# Patient Record
Sex: FEMALE | Race: WHITE | NOT HISPANIC OR LATINO | Employment: FULL TIME | URBAN - METROPOLITAN AREA
[De-identification: names, ages, dates, MRNs, and addresses within clinical notes are randomized per-mention and may not be internally consistent; named-entity substitution may affect disease eponyms.]

---

## 2017-03-03 ENCOUNTER — APPOINTMENT (OUTPATIENT)
Dept: CT IMAGING | Facility: HOSPITAL | Age: 32
End: 2017-03-03

## 2017-03-03 ENCOUNTER — HOSPITAL ENCOUNTER (EMERGENCY)
Facility: HOSPITAL | Age: 32
Discharge: HOME | End: 2017-03-04
Attending: EMERGENCY MEDICINE | Admitting: EMERGENCY MEDICINE

## 2017-03-03 DIAGNOSIS — N93.9 ABNORMAL VAGINAL BLEEDING: ICD-10-CM

## 2017-03-03 DIAGNOSIS — J01.30 ACUTE NON-RECURRENT SPHENOIDAL SINUSITIS: Primary | ICD-10-CM

## 2017-03-03 LAB
ALBUMIN SERPL-MCNC: 4.1 G/DL (ref 3.2–4.8)
ALBUMIN/GLOB SERPL: 1.3 G/DL (ref 1.5–2.5)
ALP SERPL-CCNC: 100 U/L (ref 25–100)
ALT SERPL W P-5'-P-CCNC: 24 U/L (ref 7–40)
ANION GAP SERPL CALCULATED.3IONS-SCNC: 3 MMOL/L (ref 3–11)
AST SERPL-CCNC: 21 U/L (ref 0–33)
B-HCG UR QL: NEGATIVE
BACTERIA UR QL AUTO: ABNORMAL /HPF
BASOPHILS # BLD AUTO: 0.05 10*3/MM3 (ref 0–0.2)
BASOPHILS NFR BLD AUTO: 0.7 % (ref 0–1)
BILIRUB SERPL-MCNC: 0.4 MG/DL (ref 0.3–1.2)
BILIRUB UR QL STRIP: NEGATIVE
BUN BLD-MCNC: 6 MG/DL (ref 9–23)
BUN/CREAT SERPL: 10 (ref 7–25)
CALCIUM SPEC-SCNC: 9.4 MG/DL (ref 8.7–10.4)
CHLORIDE SERPL-SCNC: 103 MMOL/L (ref 99–109)
CLARITY UR: CLEAR
CO2 SERPL-SCNC: 33 MMOL/L (ref 20–31)
COLOR UR: YELLOW
CREAT BLD-MCNC: 0.6 MG/DL (ref 0.6–1.3)
D-LACTATE SERPL-SCNC: 0.8 MMOL/L (ref 0.5–2)
DEPRECATED RDW RBC AUTO: 43.8 FL (ref 37–54)
EOSINOPHIL # BLD AUTO: 0.28 10*3/MM3 (ref 0.1–0.3)
EOSINOPHIL NFR BLD AUTO: 3.9 % (ref 0–3)
ERYTHROCYTE [DISTWIDTH] IN BLOOD BY AUTOMATED COUNT: 12.6 % (ref 11.3–14.5)
FLUAV AG NPH QL: NEGATIVE
FLUBV AG NPH QL IA: NEGATIVE
GFR SERPL CREATININE-BSD FRML MDRD: 115 ML/MIN/1.73
GLOBULIN UR ELPH-MCNC: 3.2 GM/DL
GLUCOSE BLD-MCNC: 167 MG/DL (ref 70–100)
GLUCOSE UR STRIP-MCNC: ABNORMAL MG/DL
HCT VFR BLD AUTO: 44.5 % (ref 34.5–44)
HGB BLD-MCNC: 15.1 G/DL (ref 11.5–15.5)
HGB UR QL STRIP.AUTO: ABNORMAL
HYALINE CASTS UR QL AUTO: ABNORMAL /LPF
IMM GRANULOCYTES # BLD: 0.01 10*3/MM3 (ref 0–0.03)
IMM GRANULOCYTES NFR BLD: 0.1 % (ref 0–0.6)
KETONES UR QL STRIP: NEGATIVE
LEUKOCYTE ESTERASE UR QL STRIP.AUTO: NEGATIVE
LYMPHOCYTES # BLD AUTO: 2.46 10*3/MM3 (ref 0.6–4.8)
LYMPHOCYTES NFR BLD AUTO: 33.8 % (ref 24–44)
MCH RBC QN AUTO: 32.1 PG (ref 27–31)
MCHC RBC AUTO-ENTMCNC: 33.9 G/DL (ref 32–36)
MCV RBC AUTO: 94.5 FL (ref 80–99)
MONOCYTES # BLD AUTO: 0.36 10*3/MM3 (ref 0–1)
MONOCYTES NFR BLD AUTO: 5 % (ref 0–12)
NEUTROPHILS # BLD AUTO: 4.11 10*3/MM3 (ref 1.5–8.3)
NEUTROPHILS NFR BLD AUTO: 56.5 % (ref 41–71)
NITRITE UR QL STRIP: NEGATIVE
PH UR STRIP.AUTO: 7.5 [PH] (ref 5–8)
PLATELET # BLD AUTO: 386 10*3/MM3 (ref 150–450)
PMV BLD AUTO: 10 FL (ref 6–12)
POTASSIUM BLD-SCNC: 3.5 MMOL/L (ref 3.5–5.5)
PROT SERPL-MCNC: 7.3 G/DL (ref 5.7–8.2)
PROT UR QL STRIP: NEGATIVE
RBC # BLD AUTO: 4.71 10*6/MM3 (ref 3.89–5.14)
RBC # UR: ABNORMAL /HPF
REF LAB TEST METHOD: ABNORMAL
SODIUM BLD-SCNC: 139 MMOL/L (ref 132–146)
SP GR UR STRIP: 1.01 (ref 1–1.03)
SQUAMOUS #/AREA URNS HPF: ABNORMAL /HPF
UROBILINOGEN UR QL STRIP: ABNORMAL
WBC NRBC COR # BLD: 7.27 10*3/MM3 (ref 3.5–10.8)
WBC UR QL AUTO: ABNORMAL /HPF

## 2017-03-03 PROCEDURE — 81025 URINE PREGNANCY TEST: CPT | Performed by: EMERGENCY MEDICINE

## 2017-03-03 PROCEDURE — 25010000002 ONDANSETRON PER 1 MG: Performed by: EMERGENCY MEDICINE

## 2017-03-03 PROCEDURE — 87040 BLOOD CULTURE FOR BACTERIA: CPT | Performed by: EMERGENCY MEDICINE

## 2017-03-03 PROCEDURE — 99284 EMERGENCY DEPT VISIT MOD MDM: CPT

## 2017-03-03 PROCEDURE — 25010000002 KETOROLAC TROMETHAMINE PER 15 MG: Performed by: EMERGENCY MEDICINE

## 2017-03-03 PROCEDURE — 83605 ASSAY OF LACTIC ACID: CPT | Performed by: EMERGENCY MEDICINE

## 2017-03-03 PROCEDURE — 80053 COMPREHEN METABOLIC PANEL: CPT | Performed by: EMERGENCY MEDICINE

## 2017-03-03 PROCEDURE — 96361 HYDRATE IV INFUSION ADD-ON: CPT

## 2017-03-03 PROCEDURE — 96374 THER/PROPH/DIAG INJ IV PUSH: CPT

## 2017-03-03 PROCEDURE — 25010000002 HYDROMORPHONE PER 4 MG: Performed by: EMERGENCY MEDICINE

## 2017-03-03 PROCEDURE — 87086 URINE CULTURE/COLONY COUNT: CPT | Performed by: EMERGENCY MEDICINE

## 2017-03-03 PROCEDURE — 85025 COMPLETE CBC W/AUTO DIFF WBC: CPT | Performed by: EMERGENCY MEDICINE

## 2017-03-03 PROCEDURE — 70486 CT MAXILLOFACIAL W/O DYE: CPT

## 2017-03-03 PROCEDURE — 70450 CT HEAD/BRAIN W/O DYE: CPT

## 2017-03-03 PROCEDURE — 96376 TX/PRO/DX INJ SAME DRUG ADON: CPT

## 2017-03-03 PROCEDURE — 87804 INFLUENZA ASSAY W/OPTIC: CPT | Performed by: EMERGENCY MEDICINE

## 2017-03-03 PROCEDURE — 96375 TX/PRO/DX INJ NEW DRUG ADDON: CPT

## 2017-03-03 PROCEDURE — 81001 URINALYSIS AUTO W/SCOPE: CPT | Performed by: EMERGENCY MEDICINE

## 2017-03-03 RX ORDER — HYDROMORPHONE HYDROCHLORIDE 1 MG/ML
0.5 INJECTION, SOLUTION INTRAMUSCULAR; INTRAVENOUS; SUBCUTANEOUS ONCE
Status: COMPLETED | OUTPATIENT
Start: 2017-03-03 | End: 2017-03-03

## 2017-03-03 RX ORDER — ONDANSETRON 2 MG/ML
4 INJECTION INTRAMUSCULAR; INTRAVENOUS ONCE
Status: COMPLETED | OUTPATIENT
Start: 2017-03-03 | End: 2017-03-03

## 2017-03-03 RX ORDER — AMOXICILLIN AND CLAVULANATE POTASSIUM 875; 125 MG/1; MG/1
1 TABLET, FILM COATED ORAL EVERY 12 HOURS
Qty: 20 TABLET | Refills: 0 | Status: SHIPPED | OUTPATIENT
Start: 2017-03-03 | End: 2017-06-02

## 2017-03-03 RX ORDER — KETOROLAC TROMETHAMINE 30 MG/ML
30 INJECTION, SOLUTION INTRAMUSCULAR; INTRAVENOUS ONCE
Status: COMPLETED | OUTPATIENT
Start: 2017-03-03 | End: 2017-03-03

## 2017-03-03 RX ORDER — LAMOTRIGINE 100 MG/1
150 TABLET ORAL DAILY
COMMUNITY
End: 2017-06-02

## 2017-03-03 RX ORDER — SODIUM CHLORIDE 0.9 % (FLUSH) 0.9 %
10 SYRINGE (ML) INJECTION AS NEEDED
Status: DISCONTINUED | OUTPATIENT
Start: 2017-03-03 | End: 2017-03-04 | Stop reason: HOSPADM

## 2017-03-03 RX ADMIN — HYDROMORPHONE HYDROCHLORIDE 0.5 MG: 1 INJECTION, SOLUTION INTRAMUSCULAR; INTRAVENOUS; SUBCUTANEOUS at 21:01

## 2017-03-03 RX ADMIN — KETOROLAC TROMETHAMINE 30 MG: 30 INJECTION, SOLUTION INTRAMUSCULAR at 23:46

## 2017-03-03 RX ADMIN — SODIUM CHLORIDE 1000 ML: 9 INJECTION, SOLUTION INTRAVENOUS at 21:01

## 2017-03-03 RX ADMIN — HYDROMORPHONE HYDROCHLORIDE 0.5 MG: 1 INJECTION, SOLUTION INTRAMUSCULAR; INTRAVENOUS; SUBCUTANEOUS at 23:46

## 2017-03-03 RX ADMIN — ONDANSETRON 4 MG: 2 INJECTION INTRAMUSCULAR; INTRAVENOUS at 21:01

## 2017-03-04 VITALS
HEIGHT: 67 IN | TEMPERATURE: 98 F | DIASTOLIC BLOOD PRESSURE: 80 MMHG | OXYGEN SATURATION: 99 % | SYSTOLIC BLOOD PRESSURE: 118 MMHG | BODY MASS INDEX: 29.82 KG/M2 | RESPIRATION RATE: 18 BRPM | HEART RATE: 80 BPM | WEIGHT: 190 LBS

## 2017-03-04 NOTE — ED PROVIDER NOTES
Subjective   HPI Comments: Marisol Cuellar is a 31 y.o.female who presents to the ED with c/o vaginal bleeding onset today. The patient reports she began experiencing vaginal bleeding today. The patient reports she had an endometrial ablation performed ten years ago and has not had a period since. She presents to the ED for evaluation. Additionally, the patient c/o bilateral leg pain, finger pain, nausea and headache but denies chills, cough, congestion, vomiting or any other acute complaints at this time. The patient notes her headache is worsened with light, but the patient is able to move her head and neck around normally with no pain.  She tells me that she just feels achy all over.      Patient is a 31 y.o. female presenting with vaginal bleeding.   History provided by:  Patient  Vaginal Bleeding   Quality:  Dark red  Severity:  Moderate  Onset quality:  Sudden  Timing:  Constant  Progression:  Unchanged  Chronicity:  New  Menstrual history: pt had endometrial ablation ten years ago, has not had period since.  Possible pregnancy: no    Context: spontaneously    Relieved by:  None tried  Worsened by:  Nothing  Ineffective treatments:  None tried  Associated symptoms: nausea    Associated symptoms: no abdominal pain, no back pain, no dizziness and no fever        Review of Systems   Constitutional: Negative for chills, diaphoresis and fever.   HENT: Negative for congestion, rhinorrhea and sore throat.    Respiratory: Negative for cough and shortness of breath.    Cardiovascular: Negative for chest pain.   Gastrointestinal: Positive for nausea. Negative for abdominal pain, diarrhea and vomiting.   Genitourinary: Positive for vaginal bleeding.   Musculoskeletal: Negative for back pain and neck pain.        Bilateral leg pain and finger pain.   Neurological: Positive for headaches. Negative for dizziness and weakness.   All other systems reviewed and are negative.      Past Medical History   Diagnosis Date   •  Anxiety    • Depression    • Diabetes mellitus      TYPE 1   • Graves disease        Allergies   Allergen Reactions   • Sulfa Antibiotics Anaphylaxis       Past Surgical History   Procedure Laterality Date   •  section     • Foot surgery       METAL DINA PLACED IN FOOT   • Endometrial ablation         History reviewed. No pertinent family history.    Social History     Social History   • Marital status: Single     Spouse name: N/A   • Number of children: N/A   • Years of education: N/A     Social History Main Topics   • Smoking status: Former Smoker   • Smokeless tobacco: None      Comment: QUIT 12/15   • Alcohol use No   • Drug use: No   • Sexual activity: Defer     Other Topics Concern   • None     Social History Narrative   • None         Objective   Physical Exam   Constitutional: She is oriented to person, place, and time. She appears well-developed and well-nourished. No distress.   HENT:   Head: Normocephalic and atraumatic.   Cobblestoning in throat.    Eyes: Conjunctivae are normal. Pupils are equal, round, and reactive to light. No scleral icterus.   Neck: Normal range of motion. Neck supple.   Cardiovascular: Normal rate, regular rhythm and normal heart sounds.    Pulmonary/Chest: Effort normal and breath sounds normal. No respiratory distress.   Abdominal: Soft. Bowel sounds are normal. There is no tenderness.   Abdomen is non-tender.   Musculoskeletal: Normal range of motion. She exhibits no edema.   Neurological: She is alert and oriented to person, place, and time.   Skin: Skin is warm and dry.   Psychiatric: She has a normal mood and affect. Her behavior is normal.   Nursing note and vitals reviewed.      Procedures         ED Course  ED Course   Comment By Time   Perform pelvic exam.  Cervix appeared pink and healthy.  There was only a very small amount of dark brown matter in the posterior fornix.  Don't see active bleeding or lesions.  Bimanual exam did not reveal any tenderness or palpable  shania Mckeon MD 03/03 2131   I spoke with Mrs. Cuellar and her  about findings.  I think her symptoms are from a sphenoid sinusitis.  She tells me she cannot tolerate decongestants and does not want to have a prescription for narcotics.  I told her to take Mucinex and Afrin.  Will write a prescription for antibiotics.  Regarding her bleeding I have recommended she follow up with her gynecologist who is Dr. Rufus Mckeon MD 03/03 3542     Recent Results (from the past 24 hour(s))   Comprehensive Metabolic Panel    Collection Time: 03/03/17  8:54 PM   Result Value Ref Range    Glucose 167 (H) 70 - 100 mg/dL    BUN 6 (L) 9 - 23 mg/dL    Creatinine 0.60 0.60 - 1.30 mg/dL    Sodium 139 132 - 146 mmol/L    Potassium 3.5 3.5 - 5.5 mmol/L    Chloride 103 99 - 109 mmol/L    CO2 33.0 (H) 20.0 - 31.0 mmol/L    Calcium 9.4 8.7 - 10.4 mg/dL    Total Protein 7.3 5.7 - 8.2 g/dL    Albumin 4.10 3.20 - 4.80 g/dL    ALT (SGPT) 24 7 - 40 U/L    AST (SGOT) 21 0 - 33 U/L    Alkaline Phosphatase 100 25 - 100 U/L    Total Bilirubin 0.4 0.3 - 1.2 mg/dL    eGFR Non African Amer 115 >60 mL/min/1.73    Globulin 3.2 gm/dL    A/G Ratio 1.3 (L) 1.5 - 2.5 g/dL    BUN/Creatinine Ratio 10.0 7.0 - 25.0    Anion Gap 3.0 3.0 - 11.0 mmol/L   Urinalysis With / Culture If Indicated    Collection Time: 03/03/17  8:54 PM   Result Value Ref Range    Color, UA Yellow Yellow, Straw    Appearance, UA Clear Clear    pH, UA 7.5 5.0 - 8.0    Specific Gravity, UA 1.007 1.001 - 1.030    Glucose,  mg/dL (2+) (A) Negative    Ketones, UA Negative Negative    Bilirubin, UA Negative Negative    Blood, UA Small (1+) (A) Negative    Protein, UA Negative Negative    Leuk Esterase, UA Negative Negative    Nitrite, UA Negative Negative    Urobilinogen, UA 0.2 E.U./dL 0.2 - 1.0 E.U./dL   Pregnancy, Urine    Collection Time: 03/03/17  8:54 PM   Result Value Ref Range    HCG, Urine QL Negative Negative   Lactic Acid, Plasma    Collection  Time: 03/03/17  8:54 PM   Result Value Ref Range    Lactate 0.8 0.5 - 2.0 mmol/L   CBC Auto Differential    Collection Time: 03/03/17  8:54 PM   Result Value Ref Range    WBC 7.27 3.50 - 10.80 10*3/mm3    RBC 4.71 3.89 - 5.14 10*6/mm3    Hemoglobin 15.1 11.5 - 15.5 g/dL    Hematocrit 44.5 (H) 34.5 - 44.0 %    MCV 94.5 80.0 - 99.0 fL    MCH 32.1 (H) 27.0 - 31.0 pg    MCHC 33.9 32.0 - 36.0 g/dL    RDW 12.6 11.3 - 14.5 %    RDW-SD 43.8 37.0 - 54.0 fl    MPV 10.0 6.0 - 12.0 fL    Platelets 386 150 - 450 10*3/mm3    Neutrophil % 56.5 41.0 - 71.0 %    Lymphocyte % 33.8 24.0 - 44.0 %    Monocyte % 5.0 0.0 - 12.0 %    Eosinophil % 3.9 (H) 0.0 - 3.0 %    Basophil % 0.7 0.0 - 1.0 %    Immature Grans % 0.1 0.0 - 0.6 %    Neutrophils, Absolute 4.11 1.50 - 8.30 10*3/mm3    Lymphocytes, Absolute 2.46 0.60 - 4.80 10*3/mm3    Monocytes, Absolute 0.36 0.00 - 1.00 10*3/mm3    Eosinophils, Absolute 0.28 0.10 - 0.30 10*3/mm3    Basophils, Absolute 0.05 0.00 - 0.20 10*3/mm3    Immature Grans, Absolute 0.01 0.00 - 0.03 10*3/mm3   Urinalysis, Microscopic Only    Collection Time: 03/03/17  8:54 PM   Result Value Ref Range    RBC, UA 0-2 None Seen, 0-2 /HPF    WBC, UA 0-2 (A) None Seen /HPF    Bacteria, UA None Seen None Seen, Trace /HPF    Squamous Epithelial Cells, UA 0-2 None Seen, 0-2 /HPF    Hyaline Casts, UA 0-6 0 - 6 /LPF    Methodology Automated Microscopy    Influenza Antigen    Collection Time: 03/03/17  9:01 PM   Result Value Ref Range    Influenza A Ag, EIA Negative Negative    Influenza B Ag, EIA Negative Negative     Note: In addition to lab results from this visit, the labs listed above may include labs taken at another facility or during a different encounter within the last 24 hours. Please correlate lab times with ED admission and discharge times for further clarification of the services performed during this visit.    CT Sinus Without Contrast   Final Result   Abnormal      1.  Mild ethmoid and bilateral maxillary sinus  "disease.        2.  Incidental/non-acute findings are described above.         THIS DOCUMENT HAS BEEN ELECTRONICALLY SIGNED BY VERONICA MARTINES MD      CT Head Without Contrast   Final Result   Abnormal      1. No acute findings.      2. Non-acute findings are described above.         THIS DOCUMENT HAS BEEN ELECTRONICALLY SIGNED BY VERONICA MARTINES MD        Vitals:    03/03/17 1843 03/04/17 0014   BP: 123/81 118/80   BP Location: Left arm    Patient Position: Sitting Sitting   Pulse: 82 80   Resp: 16 18   Temp: 98 °F (36.7 °C)    TempSrc: Oral    SpO2: 100% 99%   Weight: 190 lb (86.2 kg)    Height: 67\" (170.2 cm)      Medications   sodium chloride 0.9 % flush 10 mL (not administered)   sodium chloride 0.9 % bolus 1,000 mL (0 mL Intravenous Stopped 3/3/17 2215)   HYDROmorphone (DILAUDID) injection 0.5 mg (0.5 mg Intravenous Given 3/3/17 2101)   ondansetron (ZOFRAN) injection 4 mg (4 mg Intravenous Given 3/3/17 2101)   ketorolac (TORADOL) injection 30 mg (30 mg Intravenous Given 3/3/17 2346)   HYDROmorphone (DILAUDID) injection 0.5 mg (0.5 mg Intravenous Given 3/3/17 2346)     ECG/EMG Results (last 24 hours)     ** No results found for the last 24 hours. **                  MDM  Number of Diagnoses or Management Options  Abnormal vaginal bleeding: new and requires workup  Acute non-recurrent sphenoidal sinusitis: new and requires workup     Amount and/or Complexity of Data Reviewed  Clinical lab tests: ordered and reviewed  Tests in the radiology section of CPT®: ordered and reviewed  Independent visualization of images, tracings, or specimens: yes    Patient Progress  Patient progress: stable      Final diagnoses:   Acute non-recurrent sphenoidal sinusitis   Abnormal vaginal bleeding       Documentation assistance provided by edwin Lynch.  Information recorded by the edwin was done at my direction and has been verified and validated by me.     Jenny Lynch  03/03/17 2007       Jenny Dillon " [facility]  03/03/17 2132       [facility]  03/03/17 2144       Ludin Mckeon MD  03/04/17 0133

## 2017-03-05 LAB — BACTERIA SPEC AEROBE CULT: NORMAL

## 2017-03-08 LAB
BACTERIA SPEC AEROBE CULT: NORMAL
BACTERIA SPEC AEROBE CULT: NORMAL

## 2017-04-07 PROCEDURE — 99284 EMERGENCY DEPT VISIT MOD MDM: CPT

## 2017-04-08 ENCOUNTER — HOSPITAL ENCOUNTER (EMERGENCY)
Facility: HOSPITAL | Age: 32
Discharge: HOME | End: 2017-04-08
Attending: EMERGENCY MEDICINE | Admitting: EMERGENCY MEDICINE

## 2017-04-08 ENCOUNTER — APPOINTMENT (OUTPATIENT)
Dept: GENERAL RADIOLOGY | Facility: HOSPITAL | Age: 32
End: 2017-04-08

## 2017-04-08 VITALS
HEIGHT: 68 IN | SYSTOLIC BLOOD PRESSURE: 104 MMHG | DIASTOLIC BLOOD PRESSURE: 53 MMHG | TEMPERATURE: 98.1 F | OXYGEN SATURATION: 97 % | HEART RATE: 78 BPM | RESPIRATION RATE: 16 BRPM | WEIGHT: 185 LBS | BODY MASS INDEX: 28.04 KG/M2

## 2017-04-08 DIAGNOSIS — R73.9 HYPERGLYCEMIA: ICD-10-CM

## 2017-04-08 DIAGNOSIS — IMO0001 INSULIN DEPENDENT DIABETES MELLITUS: Primary | ICD-10-CM

## 2017-04-08 DIAGNOSIS — E11.65 POORLY CONTROLLED DIABETES MELLITUS (HCC): ICD-10-CM

## 2017-04-08 LAB
ALBUMIN SERPL-MCNC: 4.1 G/DL (ref 3.2–4.8)
ALBUMIN/GLOB SERPL: 1.7 G/DL (ref 1.5–2.5)
ALP SERPL-CCNC: 103 U/L (ref 25–100)
ALT SERPL W P-5'-P-CCNC: 18 U/L (ref 7–40)
ANION GAP SERPL CALCULATED.3IONS-SCNC: 5 MMOL/L (ref 3–11)
AST SERPL-CCNC: 23 U/L (ref 0–33)
B-OH-BUTYR SERPL-SCNC: 0.66 MMOL/L
BACTERIA UR QL AUTO: ABNORMAL /HPF
BASOPHILS # BLD AUTO: 0.04 10*3/MM3 (ref 0–0.2)
BASOPHILS NFR BLD AUTO: 0.5 % (ref 0–1)
BILIRUB SERPL-MCNC: 0.5 MG/DL (ref 0.3–1.2)
BILIRUB UR QL STRIP: NEGATIVE
BUN BLD-MCNC: 7 MG/DL (ref 9–23)
BUN BLDA-MCNC: 3 MG/DL (ref 8–26)
BUN/CREAT SERPL: 8.8 (ref 7–25)
CA-I BLDA-SCNC: 1.2 MMOL/L (ref 1.2–1.32)
CALCIUM SPEC-SCNC: 9.8 MG/DL (ref 8.7–10.4)
CHLORIDE BLDA-SCNC: 96 MMOL/L (ref 98–109)
CHLORIDE SERPL-SCNC: 95 MMOL/L (ref 99–109)
CLARITY UR: CLEAR
CO2 BLDA-SCNC: 25 MMOL/L (ref 24–29)
CO2 SERPL-SCNC: 30 MMOL/L (ref 20–31)
COLOR UR: YELLOW
CREAT BLD-MCNC: 0.8 MG/DL (ref 0.6–1.3)
CREAT BLDA-MCNC: 0.4 MG/DL (ref 0.6–1.3)
DEPRECATED RDW RBC AUTO: 45.8 FL (ref 37–54)
EOSINOPHIL # BLD AUTO: 0.19 10*3/MM3 (ref 0.1–0.3)
EOSINOPHIL NFR BLD AUTO: 2.5 % (ref 0–3)
ERYTHROCYTE [DISTWIDTH] IN BLOOD BY AUTOMATED COUNT: 13 % (ref 11.3–14.5)
GFR SERPL CREATININE-BSD FRML MDRD: 83 ML/MIN/1.73
GLOBULIN UR ELPH-MCNC: 2.4 GM/DL
GLUCOSE BLD-MCNC: 542 MG/DL (ref 70–100)
GLUCOSE BLDC GLUCOMTR-MCNC: 307 MG/DL (ref 70–130)
GLUCOSE BLDC GLUCOMTR-MCNC: 317 MG/DL (ref 70–130)
GLUCOSE BLDC GLUCOMTR-MCNC: 322 MG/DL (ref 70–130)
GLUCOSE BLDC GLUCOMTR-MCNC: 507 MG/DL (ref 70–130)
GLUCOSE UR STRIP-MCNC: ABNORMAL MG/DL
HCT VFR BLD AUTO: 41.7 % (ref 34.5–44)
HCT VFR BLDA CALC: 39 % (ref 38–51)
HGB BLD-MCNC: 13.6 G/DL (ref 11.5–15.5)
HGB BLDA-MCNC: 13.3 G/DL (ref 12–17)
HGB UR QL STRIP.AUTO: ABNORMAL
HYALINE CASTS UR QL AUTO: ABNORMAL /LPF
IMM GRANULOCYTES # BLD: 0.02 10*3/MM3 (ref 0–0.03)
IMM GRANULOCYTES NFR BLD: 0.3 % (ref 0–0.6)
KETONES UR QL STRIP: ABNORMAL
LEUKOCYTE ESTERASE UR QL STRIP.AUTO: NEGATIVE
LYMPHOCYTES # BLD AUTO: 2.55 10*3/MM3 (ref 0.6–4.8)
LYMPHOCYTES NFR BLD AUTO: 33.1 % (ref 24–44)
MCH RBC QN AUTO: 31.6 PG (ref 27–31)
MCHC RBC AUTO-ENTMCNC: 32.6 G/DL (ref 32–36)
MCV RBC AUTO: 96.8 FL (ref 80–99)
MONOCYTES # BLD AUTO: 0.5 10*3/MM3 (ref 0–1)
MONOCYTES NFR BLD AUTO: 6.5 % (ref 0–12)
NEUTROPHILS # BLD AUTO: 4.4 10*3/MM3 (ref 1.5–8.3)
NEUTROPHILS NFR BLD AUTO: 57.1 % (ref 41–71)
NITRITE UR QL STRIP: NEGATIVE
PH UR STRIP.AUTO: 7 [PH] (ref 5–8)
PLATELET # BLD AUTO: 329 10*3/MM3 (ref 150–450)
PMV BLD AUTO: 10.6 FL (ref 6–12)
POTASSIUM BLD-SCNC: 4.5 MMOL/L (ref 3.5–5.5)
POTASSIUM BLDA-SCNC: 3.6 MMOL/L (ref 3.5–4.9)
PROT SERPL-MCNC: 6.5 G/DL (ref 5.7–8.2)
PROT UR QL STRIP: NEGATIVE
RBC # BLD AUTO: 4.31 10*6/MM3 (ref 3.89–5.14)
RBC # UR: ABNORMAL /HPF
REF LAB TEST METHOD: ABNORMAL
SODIUM BLD-SCNC: 130 MMOL/L (ref 132–146)
SODIUM BLDA-SCNC: 137 MMOL/L (ref 138–146)
SP GR UR STRIP: 1.03 (ref 1–1.03)
SQUAMOUS #/AREA URNS HPF: ABNORMAL /HPF
UROBILINOGEN UR QL STRIP: ABNORMAL
WBC NRBC COR # BLD: 7.7 10*3/MM3 (ref 3.5–10.8)
WBC UR QL AUTO: ABNORMAL /HPF

## 2017-04-08 PROCEDURE — 25010000002 ONDANSETRON PER 1 MG: Performed by: PHYSICIAN ASSISTANT

## 2017-04-08 PROCEDURE — 82010 KETONE BODYS QUAN: CPT | Performed by: PHYSICIAN ASSISTANT

## 2017-04-08 PROCEDURE — 85014 HEMATOCRIT: CPT

## 2017-04-08 PROCEDURE — 96361 HYDRATE IV INFUSION ADD-ON: CPT

## 2017-04-08 PROCEDURE — 96375 TX/PRO/DX INJ NEW DRUG ADDON: CPT

## 2017-04-08 PROCEDURE — 82962 GLUCOSE BLOOD TEST: CPT

## 2017-04-08 PROCEDURE — 71020 HC CHEST PA AND LATERAL: CPT

## 2017-04-08 PROCEDURE — 63710000001 INSULIN REGULAR HUMAN PER 5 UNITS: Performed by: PHYSICIAN ASSISTANT

## 2017-04-08 PROCEDURE — 25010000002 KETOROLAC TROMETHAMINE PER 15 MG: Performed by: PHYSICIAN ASSISTANT

## 2017-04-08 PROCEDURE — 80053 COMPREHEN METABOLIC PANEL: CPT | Performed by: PHYSICIAN ASSISTANT

## 2017-04-08 PROCEDURE — 80047 BASIC METABLC PNL IONIZED CA: CPT

## 2017-04-08 PROCEDURE — 96374 THER/PROPH/DIAG INJ IV PUSH: CPT

## 2017-04-08 PROCEDURE — 85025 COMPLETE CBC W/AUTO DIFF WBC: CPT | Performed by: PHYSICIAN ASSISTANT

## 2017-04-08 PROCEDURE — 81001 URINALYSIS AUTO W/SCOPE: CPT | Performed by: PHYSICIAN ASSISTANT

## 2017-04-08 RX ORDER — ONDANSETRON 2 MG/ML
4 INJECTION INTRAMUSCULAR; INTRAVENOUS ONCE
Status: COMPLETED | OUTPATIENT
Start: 2017-04-08 | End: 2017-04-08

## 2017-04-08 RX ORDER — KETOROLAC TROMETHAMINE 15 MG/ML
15 INJECTION, SOLUTION INTRAMUSCULAR; INTRAVENOUS ONCE
Status: COMPLETED | OUTPATIENT
Start: 2017-04-08 | End: 2017-04-08

## 2017-04-08 RX ORDER — SODIUM CHLORIDE 0.9 % (FLUSH) 0.9 %
10 SYRINGE (ML) INJECTION AS NEEDED
Status: DISCONTINUED | OUTPATIENT
Start: 2017-04-08 | End: 2017-04-08 | Stop reason: HOSPADM

## 2017-04-08 RX ADMIN — ONDANSETRON 4 MG: 2 INJECTION INTRAMUSCULAR; INTRAVENOUS at 03:14

## 2017-04-08 RX ADMIN — INSULIN HUMAN 10 UNITS: 100 INJECTION, SOLUTION PARENTERAL at 02:45

## 2017-04-08 RX ADMIN — SODIUM CHLORIDE 1000 ML: 9 INJECTION, SOLUTION INTRAVENOUS at 02:19

## 2017-04-08 RX ADMIN — SODIUM CHLORIDE 1000 ML: 9 INJECTION, SOLUTION INTRAVENOUS at 03:30

## 2017-04-08 RX ADMIN — KETOROLAC TROMETHAMINE 15 MG: 15 INJECTION, SOLUTION INTRAMUSCULAR; INTRAVENOUS at 03:30

## 2017-04-08 NOTE — ED PROVIDER NOTES
Subjective   Patient is a 31 y.o. female presenting with hyperglycemia.   Hyperglycemia   Blood sugar level PTA:  600  Onset quality:  Gradual  Duration:  1 day  Timing:  Constant  Progression:  Partially resolved  Chronicity:  Recurrent  Diabetes status:  Controlled with insulin  Context: noncompliance    Context: not change in medication and not recent illness    Relieved by:  Nothing  Ineffective treatments:  None tried  Associated symptoms: dehydration, dizziness, fatigue, malaise and nausea    Associated symptoms: no abdominal pain, no altered mental status, no chest pain, no confusion, no diaphoresis, no dysuria and no vomiting      31-year-old female presents with a 24-hour history of worsening hyperglycemia increased thirst polyuria dehydration weakness fatigue malaise and muscle aches.  She has a history of noncompliance, average hemoglobin A1c is 10+, she is followed by [facility] department of endocrinology.  She is not prone to ketosis.  She denies vision changes or photophobia no stiff neck no chest pain or abdominal pain.She believes she forgot to take her insulin this morning.    Review of Systems   Constitutional: Positive for fatigue. Negative for diaphoresis.   Cardiovascular: Negative for chest pain.   Gastrointestinal: Positive for nausea. Negative for abdominal pain and vomiting.   Genitourinary: Negative for dysuria.   Neurological: Positive for dizziness.   Psychiatric/Behavioral: Negative for confusion.   All other systems reviewed and are negative.      Past Medical History:   Diagnosis Date   • Anxiety    • Depression    • Diabetes mellitus     TYPE 1   • Graves disease        Allergies   Allergen Reactions   • Sulfa Antibiotics Anaphylaxis       Past Surgical History:   Procedure Laterality Date   •  SECTION     • ENDOMETRIAL ABLATION     • FOOT SURGERY      METAL DINA PLACED IN FOOT       History reviewed. No pertinent family history.    Social History     Social  History   • Marital status: Single     Spouse name: N/A   • Number of children: N/A   • Years of education: N/A     Social History Main Topics   • Smoking status: Former Smoker   • Smokeless tobacco: None      Comment: QUIT 12/15   • Alcohol use No   • Drug use: No   • Sexual activity: Defer     Other Topics Concern   • None     Social History Narrative           Objective   Physical Exam   Constitutional: She is oriented to person, place, and time. She appears well-developed and well-nourished. No distress.   Awake alert oriented not toxic mucous membranes are dry Airways patent   HENT:   Head: Normocephalic and atraumatic.   Right Ear: External ear normal.   Left Ear: External ear normal.   Nose: Nose normal.   Mouth/Throat: Oropharynx is clear and moist. No oropharyngeal exudate.   Eyes: Conjunctivae and EOM are normal. Pupils are equal, round, and reactive to light. Right eye exhibits no discharge. Left eye exhibits no discharge. No scleral icterus.   Neck: Normal range of motion. Neck supple. No JVD present. No tracheal deviation present. No thyromegaly present.   Cardiovascular: Normal rate, regular rhythm, normal heart sounds and intact distal pulses.  Exam reveals no gallop and no friction rub.    No murmur heard.  Pulmonary/Chest: Effort normal and breath sounds normal. No stridor. No respiratory distress. She has no wheezes. She has no rales. She exhibits no tenderness.   Abdominal: Soft. Bowel sounds are normal. She exhibits no distension. There is no tenderness. There is no rebound and no guarding.   Musculoskeletal: Normal range of motion. She exhibits no edema, tenderness or deformity.   Neurological: She is alert and oriented to person, place, and time. No cranial nerve deficit. She exhibits normal muscle tone. Coordination normal.   Skin: Skin is warm and dry. No rash noted. She is not diaphoretic. No erythema. No pallor.   Psychiatric: She has a normal mood and affect. Her behavior is normal.  Judgment and thought content normal.   Nursing note and vitals reviewed.      Procedures        Recent Results (from the past 24 hour(s))   POC Glucose Fingerstick    Collection Time: 04/08/17  2:01 AM   Result Value Ref Range    Glucose 507 (C) 70 - 130 mg/dL   CBC Auto Differential    Collection Time: 04/08/17  2:16 AM   Result Value Ref Range    WBC 7.70 3.50 - 10.80 10*3/mm3    RBC 4.31 3.89 - 5.14 10*6/mm3    Hemoglobin 13.6 11.5 - 15.5 g/dL    Hematocrit 41.7 34.5 - 44.0 %    MCV 96.8 80.0 - 99.0 fL    MCH 31.6 (H) 27.0 - 31.0 pg    MCHC 32.6 32.0 - 36.0 g/dL    RDW 13.0 11.3 - 14.5 %    RDW-SD 45.8 37.0 - 54.0 fl    MPV 10.6 6.0 - 12.0 fL    Platelets 329 150 - 450 10*3/mm3    Neutrophil % 57.1 41.0 - 71.0 %    Lymphocyte % 33.1 24.0 - 44.0 %    Monocyte % 6.5 0.0 - 12.0 %    Eosinophil % 2.5 0.0 - 3.0 %    Basophil % 0.5 0.0 - 1.0 %    Immature Grans % 0.3 0.0 - 0.6 %    Neutrophils, Absolute 4.40 1.50 - 8.30 10*3/mm3    Lymphocytes, Absolute 2.55 0.60 - 4.80 10*3/mm3    Monocytes, Absolute 0.50 0.00 - 1.00 10*3/mm3    Eosinophils, Absolute 0.19 0.10 - 0.30 10*3/mm3    Basophils, Absolute 0.04 0.00 - 0.20 10*3/mm3    Immature Grans, Absolute 0.02 0.00 - 0.03 10*3/mm3   Comprehensive Metabolic Panel    Collection Time: 04/08/17  2:16 AM   Result Value Ref Range    Glucose 542 (C) 70 - 100 mg/dL    BUN 7 (L) 9 - 23 mg/dL    Creatinine 0.80 0.60 - 1.30 mg/dL    Sodium 130 (L) 132 - 146 mmol/L    Potassium 4.5 3.5 - 5.5 mmol/L    Chloride 95 (L) 99 - 109 mmol/L    CO2 30.0 20.0 - 31.0 mmol/L    Calcium 9.8 8.7 - 10.4 mg/dL    Total Protein 6.5 5.7 - 8.2 g/dL    Albumin 4.10 3.20 - 4.80 g/dL    ALT (SGPT) 18 7 - 40 U/L    AST (SGOT) 23 0 - 33 U/L    Alkaline Phosphatase 103 (H) 25 - 100 U/L    Total Bilirubin 0.5 0.3 - 1.2 mg/dL    eGFR Non African Amer 83 >60 mL/min/1.73    Globulin 2.4 gm/dL    A/G Ratio 1.7 1.5 - 2.5 g/dL    BUN/Creatinine Ratio 8.8 7.0 - 25.0    Anion Gap 5.0 3.0 - 11.0 mmol/L   Beta  Hydroxybutyrate Quantitative    Collection Time: 04/08/17  2:16 AM   Result Value Ref Range    Beta-Hydroxybutyrate Quant 0.660 (H) <=0.500 mmol/L   Urinalysis With / Culture If Indicated    Collection Time: 04/08/17  2:42 AM   Result Value Ref Range    Color, UA Yellow Yellow, Straw    Appearance, UA Clear Clear    pH, UA 7.0 5.0 - 8.0    Specific Gravity, UA 1.029 1.001 - 1.030    Glucose, UA >=1000 mg/dL (3+) (A) Negative    Ketones, UA 15 mg/dL (1+) (A) Negative    Bilirubin, UA Negative Negative    Blood, UA Large (3+) (A) Negative    Protein, UA Negative Negative    Leuk Esterase, UA Negative Negative    Nitrite, UA Negative Negative    Urobilinogen, UA 0.2 E.U./dL 0.2 - 1.0 E.U./dL   Urinalysis, Microscopic Only    Collection Time: 04/08/17  2:42 AM   Result Value Ref Range    RBC, UA 31-50 (A) None Seen, 0-2 /HPF    WBC, UA 0-2 (A) None Seen /HPF    Bacteria, UA None Seen None Seen, Trace /HPF    Squamous Epithelial Cells, UA None Seen None Seen, 0-2 /HPF    Hyaline Casts, UA None Seen 0 - 6 /LPF    Methodology Automated Microscopy    POC Glucose Fingerstick    Collection Time: 04/08/17  4:25 AM   Result Value Ref Range    Glucose 322 (H) 70 - 130 mg/dL     Note: In addition to lab results from this visit, the labs listed above may include labs taken at another facility or during a different encounter within the last 24 hours. Please correlate lab times with ED admission and discharge times for further clarification of the services performed during this visit.    XR Chest PA & Lateral    (Results Pending)     Vitals:    04/08/17 0217 04/08/17 0315 04/08/17 0415 04/08/17 0416   BP: 126/79 119/78 112/78    BP Location:       Patient Position:       Pulse:       Resp:       Temp:       TempSrc:       SpO2:  97%  95%   Weight:       Height:         Medications   sodium chloride 0.9 % flush 10 mL (not administered)   insulin regular (humuLIN R,novoLIN R) injection 10 Units (10 Units Subcutaneous Given 4/8/17  0245)   sodium chloride 0.9 % bolus 1,000 mL (1,000 mL Intravenous New Bag 4/8/17 0219)   ondansetron (ZOFRAN) injection 4 mg (4 mg Intravenous Given 4/8/17 0314)   sodium chloride 0.9 % bolus 1,000 mL (1,000 mL Intravenous New Bag 4/8/17 0330)   ketorolac (TORADOL) injection 15 mg (15 mg Intravenous Given 4/8/17 0330)     ECG/EMG Results (last 24 hours)     ** No results found for the last 24 hours. **            ED Course  ED Course   Value Comment By Time   Beta-Hydroxybutyrate Quant: (!) 0.660 (Reviewed) Nicolas Pitt PA-C 04/08 0322    Recheck as 0 425, by mouth C glucose 322, beta hydroxybutyrate 0.66, ketonuria 1+, specific gravity 1.029, anion gap is 5.    She  is not ketotic, is not acidotic, is not significantly dehydrated, we will discharge to home with instructions to closely monitor glucose levels and maintain adequate dietary intake.  She was encouraged to follow up with her endocrinologist to discuss regular sliding scale insulin in addition to her mixed insulin dosages.patient and her mother voice understanding and are agreeable with plan. Nicolas Pitt PA-C 04/08 0511                  [facility]    Final diagnoses:   Insulin dependent diabetes mellitus   Hyperglycemia   Poorly controlled diabetes mellitus            Nicolas Pitt PA-C  04/08/17 0513

## 2017-04-08 NOTE — DISCHARGE INSTRUCTIONS
Increase her fluid intake and maintain adequate dietary intake.  Monitor your blood glucose levels closely.  Contact your endocrinologist as soon as possible for recheck and discuss adding regular sliding scale insulin to your mix insulin dosage.  Return to emergency department if any change or worsening.

## 2017-06-02 ENCOUNTER — HOSPITAL ENCOUNTER (OUTPATIENT)
Facility: HOSPITAL | Age: 32
Setting detail: OBSERVATION
Discharge: HOME | End: 2017-06-03
Attending: EMERGENCY MEDICINE | Admitting: INTERNAL MEDICINE

## 2017-06-02 ENCOUNTER — APPOINTMENT (OUTPATIENT)
Dept: GENERAL RADIOLOGY | Facility: HOSPITAL | Age: 32
End: 2017-06-02

## 2017-06-02 DIAGNOSIS — R07.9 CHEST PAIN, UNSPECIFIED TYPE: Primary | ICD-10-CM

## 2017-06-02 DIAGNOSIS — E10.8 TYPE 1 DIABETES MELLITUS WITH COMPLICATION (HCC): ICD-10-CM

## 2017-06-02 DIAGNOSIS — R73.9 HYPERGLYCEMIA: ICD-10-CM

## 2017-06-02 PROBLEM — R52 GENERALIZED PAIN: Status: ACTIVE | Noted: 2017-06-02

## 2017-06-02 PROBLEM — E11.9 DIABETES MELLITUS: Status: ACTIVE | Noted: 2017-06-02

## 2017-06-02 PROBLEM — R11.2 NAUSEA AND VOMITING DUE TO HYPERGLYCEMIA: Status: ACTIVE | Noted: 2017-06-02

## 2017-06-02 PROBLEM — E87.1 HYPONATREMIA: Status: ACTIVE | Noted: 2017-06-02

## 2017-06-02 LAB
ALBUMIN SERPL-MCNC: 4.8 G/DL (ref 3.2–4.8)
ALBUMIN/GLOB SERPL: 1.5 G/DL (ref 1.5–2.5)
ALP SERPL-CCNC: 128 U/L (ref 25–100)
ALT SERPL W P-5'-P-CCNC: 18 U/L (ref 7–40)
ANION GAP SERPL CALCULATED.3IONS-SCNC: 14 MMOL/L (ref 3–11)
AST SERPL-CCNC: 24 U/L (ref 0–33)
ATMOSPHERIC PRESS: 741 MMHG
B-HCG UR QL: NEGATIVE
B-OH-BUTYR SERPL-SCNC: 4.61 MMOL/L
BASE EXCESS BLDV CALC-SCNC: -5.4 MMOL/L (ref -2–2)
BASOPHILS # BLD AUTO: 0.08 10*3/MM3 (ref 0–0.2)
BASOPHILS NFR BLD AUTO: 0.5 % (ref 0–1)
BDY SITE: ABNORMAL
BILIRUB SERPL-MCNC: 0.6 MG/DL (ref 0.3–1.2)
BILIRUB UR QL STRIP: NEGATIVE
BUN BLD-MCNC: 13 MG/DL (ref 9–23)
BUN/CREAT SERPL: 14.4 (ref 7–25)
CALCIUM SPEC-SCNC: 9.8 MG/DL (ref 8.7–10.4)
CHLORIDE SERPL-SCNC: 92 MMOL/L (ref 99–109)
CLARITY UR: CLEAR
CO2 SERPL-SCNC: 24 MMOL/L (ref 20–31)
COHGB MFR BLD: 1.3 % (ref 0–2)
COLOR UR: YELLOW
CREAT BLD-MCNC: 0.9 MG/DL (ref 0.6–1.3)
D DIMER PPP FEU-MCNC: 0.39 MG/L (FEU) (ref 0–0.5)
DEPRECATED RDW RBC AUTO: 46 FL (ref 37–54)
EOSINOPHIL # BLD AUTO: 0.34 10*3/MM3 (ref 0.1–0.3)
EOSINOPHIL NFR BLD AUTO: 2 % (ref 0–3)
ERYTHROCYTE [DISTWIDTH] IN BLOOD BY AUTOMATED COUNT: 12.9 % (ref 11.3–14.5)
GFR SERPL CREATININE-BSD FRML MDRD: 72 ML/MIN/1.73
GLOBULIN UR ELPH-MCNC: 3.1 GM/DL
GLUCOSE BLD-MCNC: 586 MG/DL (ref 70–100)
GLUCOSE BLDC GLUCOMTR-MCNC: 265 MG/DL (ref 70–130)
GLUCOSE BLDC GLUCOMTR-MCNC: 331 MG/DL (ref 70–130)
GLUCOSE BLDC GLUCOMTR-MCNC: 356 MG/DL (ref 70–130)
GLUCOSE BLDC GLUCOMTR-MCNC: 417 MG/DL (ref 70–130)
GLUCOSE BLDC GLUCOMTR-MCNC: 417 MG/DL (ref 70–130)
GLUCOSE BLDC GLUCOMTR-MCNC: 420 MG/DL (ref 70–130)
GLUCOSE BLDC GLUCOMTR-MCNC: 451 MG/DL (ref 70–130)
GLUCOSE BLDC GLUCOMTR-MCNC: 452 MG/DL (ref 70–130)
GLUCOSE BLDC GLUCOMTR-MCNC: 486 MG/DL (ref 70–130)
GLUCOSE BLDC GLUCOMTR-MCNC: 492 MG/DL (ref 70–130)
GLUCOSE BLDC GLUCOMTR-MCNC: 524 MG/DL (ref 70–130)
GLUCOSE BLDC GLUCOMTR-MCNC: 537 MG/DL (ref 70–130)
GLUCOSE BLDC GLUCOMTR-MCNC: 587 MG/DL (ref 70–130)
GLUCOSE UR STRIP-MCNC: ABNORMAL MG/DL
HCO3 BLDV-SCNC: 18.2 MMOL/L (ref 22–28)
HCT VFR BLD AUTO: 47.5 % (ref 34.5–44)
HGB BLD-MCNC: 15.6 G/DL (ref 11.5–15.5)
HGB BLDA-MCNC: 15 G/DL (ref 14–18)
HGB UR QL STRIP.AUTO: NEGATIVE
HOLD SPECIMEN: NORMAL
HOLD SPECIMEN: NORMAL
IMM GRANULOCYTES # BLD: 0.05 10*3/MM3 (ref 0–0.03)
IMM GRANULOCYTES NFR BLD: 0.3 % (ref 0–0.6)
INTERNAL NEGATIVE CONTROL: POSITIVE
INTERNAL POSITIVE CONTROL: NEGATIVE
KETONES UR QL STRIP: ABNORMAL
LEUKOCYTE ESTERASE UR QL STRIP.AUTO: NEGATIVE
LYMPHOCYTES # BLD AUTO: 2.56 10*3/MM3 (ref 0.6–4.8)
LYMPHOCYTES NFR BLD AUTO: 15.1 % (ref 24–44)
Lab: NORMAL
MCH RBC QN AUTO: 32 PG (ref 27–31)
MCHC RBC AUTO-ENTMCNC: 32.8 G/DL (ref 32–36)
MCV RBC AUTO: 97.3 FL (ref 80–99)
METHGB BLD QL: 1.1 % (ref 0–1.5)
MODALITY: ABNORMAL
MONOCYTES # BLD AUTO: 0.81 10*3/MM3 (ref 0–1)
MONOCYTES NFR BLD AUTO: 4.8 % (ref 0–12)
NEUTROPHILS # BLD AUTO: 13.1 10*3/MM3 (ref 1.5–8.3)
NEUTROPHILS NFR BLD AUTO: 77.3 % (ref 41–71)
NITRITE UR QL STRIP: NEGATIVE
OXYHGB MFR BLDV: 68 % (ref 94–99)
PCO2 BLDV: 28 MM HG (ref 41–51)
PH BLDV: 7.42 [PH] (ref 7.25–7.5)
PH UR STRIP.AUTO: 5.5 [PH] (ref 5–8)
PLATELET # BLD AUTO: 390 10*3/MM3 (ref 150–450)
PMV BLD AUTO: 10.7 FL (ref 6–12)
PO2 BLDV: 34 MM HG (ref 27–53)
POTASSIUM BLD-SCNC: 4.8 MMOL/L (ref 3.5–5.5)
PROT SERPL-MCNC: 7.9 G/DL (ref 5.7–8.2)
PROT UR QL STRIP: NEGATIVE
RBC # BLD AUTO: 4.88 10*6/MM3 (ref 3.89–5.14)
SODIUM BLD-SCNC: 130 MMOL/L (ref 132–146)
SP GR UR STRIP: 1.03 (ref 1–1.03)
TROPONIN I SERPL-MCNC: <0.006 NG/ML
TROPONIN I SERPL-MCNC: <0.006 NG/ML
UROBILINOGEN UR QL STRIP: ABNORMAL
WBC NRBC COR # BLD: 16.94 10*3/MM3 (ref 3.5–10.8)
WHOLE BLOOD HOLD SPECIMEN: NORMAL
WHOLE BLOOD HOLD SPECIMEN: NORMAL

## 2017-06-02 PROCEDURE — 96375 TX/PRO/DX INJ NEW DRUG ADDON: CPT

## 2017-06-02 PROCEDURE — 80053 COMPREHEN METABOLIC PANEL: CPT

## 2017-06-02 PROCEDURE — 25010000002 HYDROMORPHONE PER 4 MG: Performed by: EMERGENCY MEDICINE

## 2017-06-02 PROCEDURE — 93005 ELECTROCARDIOGRAM TRACING: CPT

## 2017-06-02 PROCEDURE — 85379 FIBRIN DEGRADATION QUANT: CPT | Performed by: EMERGENCY MEDICINE

## 2017-06-02 PROCEDURE — 71010 HC CHEST PA OR AP: CPT

## 2017-06-02 PROCEDURE — 84484 ASSAY OF TROPONIN QUANT: CPT | Performed by: EMERGENCY MEDICINE

## 2017-06-02 PROCEDURE — 99284 EMERGENCY DEPT VISIT MOD MDM: CPT

## 2017-06-02 PROCEDURE — 82962 GLUCOSE BLOOD TEST: CPT

## 2017-06-02 PROCEDURE — 25010000002 ONDANSETRON PER 1 MG: Performed by: EMERGENCY MEDICINE

## 2017-06-02 PROCEDURE — 82010 KETONE BODYS QUAN: CPT

## 2017-06-02 PROCEDURE — G0378 HOSPITAL OBSERVATION PER HR: HCPCS

## 2017-06-02 PROCEDURE — 99220 PR INITIAL OBSERVATION CARE/DAY 70 MINUTES: CPT | Performed by: FAMILY MEDICINE

## 2017-06-02 PROCEDURE — 63710000001 INSULIN REGULAR HUMAN PER 5 UNITS: Performed by: EMERGENCY MEDICINE

## 2017-06-02 PROCEDURE — 25010000002 MORPHINE PER 10 MG: Performed by: EMERGENCY MEDICINE

## 2017-06-02 PROCEDURE — 63710000001 INSULIN LISPRO (HUMAN) PER 5 UNITS: Performed by: NURSE PRACTITIONER

## 2017-06-02 PROCEDURE — 36415 COLL VENOUS BLD VENIPUNCTURE: CPT

## 2017-06-02 PROCEDURE — 81003 URINALYSIS AUTO W/O SCOPE: CPT

## 2017-06-02 PROCEDURE — 82805 BLOOD GASES W/O2 SATURATION: CPT | Performed by: EMERGENCY MEDICINE

## 2017-06-02 PROCEDURE — 85025 COMPLETE CBC W/AUTO DIFF WBC: CPT

## 2017-06-02 PROCEDURE — 96376 TX/PRO/DX INJ SAME DRUG ADON: CPT

## 2017-06-02 PROCEDURE — 25010000002 KETOROLAC TROMETHAMINE PER 15 MG: Performed by: EMERGENCY MEDICINE

## 2017-06-02 PROCEDURE — 96361 HYDRATE IV INFUSION ADD-ON: CPT

## 2017-06-02 PROCEDURE — 25010000002 LORAZEPAM PER 2 MG: Performed by: EMERGENCY MEDICINE

## 2017-06-02 PROCEDURE — 63710000001 INSULIN LISPRO PROTAMINE-INSULIN LISPRO (75-25) 100 UNIT/ML SUSPENSION 10 ML VIAL: Performed by: NURSE PRACTITIONER

## 2017-06-02 PROCEDURE — 96374 THER/PROPH/DIAG INJ IV PUSH: CPT

## 2017-06-02 RX ORDER — BUPROPION HYDROCHLORIDE 100 MG/1
100 TABLET, EXTENDED RELEASE ORAL NIGHTLY
COMMUNITY
End: 2018-02-20 | Stop reason: DRUGHIGH

## 2017-06-02 RX ORDER — POTASSIUM CHLORIDE 7.46 G/1000ML
10 INJECTION, SOLUTION INTRAVENOUS
Status: DISCONTINUED | OUTPATIENT
Start: 2017-06-02 | End: 2017-06-03 | Stop reason: HOSPADM

## 2017-06-02 RX ORDER — BUPROPION HYDROCHLORIDE 75 MG/1
75 TABLET ORAL DAILY
COMMUNITY
End: 2017-06-02

## 2017-06-02 RX ORDER — BUPROPION HYDROCHLORIDE 100 MG/1
100 TABLET, EXTENDED RELEASE ORAL NIGHTLY
Status: DISCONTINUED | OUTPATIENT
Start: 2017-06-02 | End: 2017-06-03 | Stop reason: HOSPADM

## 2017-06-02 RX ORDER — LAMOTRIGINE 100 MG/1
150 TABLET ORAL DAILY
Status: DISCONTINUED | OUTPATIENT
Start: 2017-06-02 | End: 2017-06-03 | Stop reason: HOSPADM

## 2017-06-02 RX ORDER — LORAZEPAM 2 MG/ML
0.5 INJECTION INTRAMUSCULAR ONCE
Status: DISCONTINUED | OUTPATIENT
Start: 2017-06-02 | End: 2017-06-02

## 2017-06-02 RX ORDER — POTASSIUM CHLORIDE 1.5 G/1.77G
20 POWDER, FOR SOLUTION ORAL AS NEEDED
Status: DISCONTINUED | OUTPATIENT
Start: 2017-06-02 | End: 2017-06-03 | Stop reason: HOSPADM

## 2017-06-02 RX ORDER — SODIUM CHLORIDE 0.9 % (FLUSH) 0.9 %
1-10 SYRINGE (ML) INJECTION AS NEEDED
Status: DISCONTINUED | OUTPATIENT
Start: 2017-06-02 | End: 2017-06-03 | Stop reason: HOSPADM

## 2017-06-02 RX ORDER — CETIRIZINE HYDROCHLORIDE 10 MG/1
10 TABLET ORAL NIGHTLY
COMMUNITY
End: 2021-01-10

## 2017-06-02 RX ORDER — VENLAFAXINE 37.5 MG/1
150 TABLET ORAL EVERY 12 HOURS SCHEDULED
Status: DISCONTINUED | OUTPATIENT
Start: 2017-06-02 | End: 2017-06-03 | Stop reason: HOSPADM

## 2017-06-02 RX ORDER — INSULIN GLARGINE 100 [IU]/ML
40 INJECTION, SOLUTION SUBCUTANEOUS 2 TIMES DAILY
COMMUNITY
End: 2017-06-02

## 2017-06-02 RX ORDER — LEVOTHYROXINE SODIUM 112 UG/1
280 TABLET ORAL
Status: DISCONTINUED | OUTPATIENT
Start: 2017-06-03 | End: 2017-06-03 | Stop reason: HOSPADM

## 2017-06-02 RX ORDER — ONDANSETRON 2 MG/ML
4 INJECTION INTRAMUSCULAR; INTRAVENOUS EVERY 6 HOURS PRN
Status: DISCONTINUED | OUTPATIENT
Start: 2017-06-02 | End: 2017-06-03 | Stop reason: HOSPADM

## 2017-06-02 RX ORDER — SODIUM CHLORIDE 0.9 % (FLUSH) 0.9 %
10 SYRINGE (ML) INJECTION AS NEEDED
Status: DISCONTINUED | OUTPATIENT
Start: 2017-06-02 | End: 2017-06-03 | Stop reason: HOSPADM

## 2017-06-02 RX ORDER — KETOROLAC TROMETHAMINE 15 MG/ML
15 INJECTION, SOLUTION INTRAMUSCULAR; INTRAVENOUS ONCE AS NEEDED
Status: COMPLETED | OUTPATIENT
Start: 2017-06-02 | End: 2017-06-02

## 2017-06-02 RX ORDER — NICOTINE POLACRILEX 4 MG
15 LOZENGE BUCCAL
Status: DISCONTINUED | OUTPATIENT
Start: 2017-06-02 | End: 2017-06-03 | Stop reason: HOSPADM

## 2017-06-02 RX ORDER — VENLAFAXINE 75 MG/1
150 TABLET ORAL 2 TIMES DAILY
Status: ON HOLD | COMMUNITY
End: 2019-04-04

## 2017-06-02 RX ORDER — LORAZEPAM 2 MG/ML
0.5 INJECTION INTRAMUSCULAR ONCE
Status: COMPLETED | OUTPATIENT
Start: 2017-06-02 | End: 2017-06-02

## 2017-06-02 RX ORDER — DEXTROSE MONOHYDRATE 25 G/50ML
25 INJECTION, SOLUTION INTRAVENOUS
Status: DISCONTINUED | OUTPATIENT
Start: 2017-06-02 | End: 2017-06-03 | Stop reason: HOSPADM

## 2017-06-02 RX ORDER — ONDANSETRON 2 MG/ML
4 INJECTION INTRAMUSCULAR; INTRAVENOUS ONCE
Status: COMPLETED | OUTPATIENT
Start: 2017-06-02 | End: 2017-06-02

## 2017-06-02 RX ORDER — POTASSIUM CHLORIDE 750 MG/1
20 CAPSULE, EXTENDED RELEASE ORAL AS NEEDED
Status: DISCONTINUED | OUTPATIENT
Start: 2017-06-02 | End: 2017-06-03 | Stop reason: HOSPADM

## 2017-06-02 RX ORDER — ACETAMINOPHEN 325 MG/1
650 TABLET ORAL EVERY 4 HOURS PRN
Status: DISCONTINUED | OUTPATIENT
Start: 2017-06-02 | End: 2017-06-03 | Stop reason: HOSPADM

## 2017-06-02 RX ORDER — CLONAZEPAM 1 MG/1
1 TABLET ORAL 2 TIMES DAILY PRN
Status: ON HOLD | COMMUNITY
End: 2019-04-04

## 2017-06-02 RX ORDER — CETIRIZINE HYDROCHLORIDE 10 MG/1
10 TABLET ORAL NIGHTLY
Status: DISCONTINUED | OUTPATIENT
Start: 2017-06-02 | End: 2017-06-03 | Stop reason: HOSPADM

## 2017-06-02 RX ORDER — MORPHINE SULFATE 4 MG/ML
4 INJECTION, SOLUTION INTRAMUSCULAR; INTRAVENOUS ONCE
Status: COMPLETED | OUTPATIENT
Start: 2017-06-02 | End: 2017-06-02

## 2017-06-02 RX ORDER — HYDROMORPHONE HYDROCHLORIDE 1 MG/ML
0.5 INJECTION, SOLUTION INTRAMUSCULAR; INTRAVENOUS; SUBCUTANEOUS ONCE
Status: COMPLETED | OUTPATIENT
Start: 2017-06-02 | End: 2017-06-02

## 2017-06-02 RX ORDER — LAMOTRIGINE 150 MG/1
150 TABLET ORAL DAILY
Status: ON HOLD | COMMUNITY
End: 2019-04-04

## 2017-06-02 RX ADMIN — INSULIN HUMAN 8.6 UNITS: 100 INJECTION, SOLUTION PARENTERAL at 09:01

## 2017-06-02 RX ADMIN — VENLAFAXINE 150 MG: 37.5 TABLET ORAL at 21:24

## 2017-06-02 RX ADMIN — BUPROPION HYDROCHLORIDE 100 MG: 100 TABLET, EXTENDED RELEASE ORAL at 21:24

## 2017-06-02 RX ADMIN — INSULIN LISPRO 36 UNITS: 100 INJECTION, SUSPENSION SUBCUTANEOUS at 22:41

## 2017-06-02 RX ADMIN — SODIUM CHLORIDE 1000 ML: 9 INJECTION, SOLUTION INTRAVENOUS at 09:05

## 2017-06-02 RX ADMIN — LORAZEPAM 0.5 MG: 2 INJECTION, SOLUTION INTRAMUSCULAR; INTRAVENOUS at 08:58

## 2017-06-02 RX ADMIN — SODIUM CHLORIDE 1000 ML: 9 INJECTION, SOLUTION INTRAVENOUS at 10:05

## 2017-06-02 RX ADMIN — INSULIN HUMAN 8.6 UNITS: 100 INJECTION, SOLUTION PARENTERAL at 14:31

## 2017-06-02 RX ADMIN — KETOROLAC TROMETHAMINE 15 MG: 15 INJECTION, SOLUTION INTRAMUSCULAR; INTRAVENOUS at 10:00

## 2017-06-02 RX ADMIN — CETIRIZINE HYDROCHLORIDE 10 MG: 10 TABLET, FILM COATED ORAL at 21:24

## 2017-06-02 RX ADMIN — LAMOTRIGINE 150 MG: 100 TABLET ORAL at 17:39

## 2017-06-02 RX ADMIN — ONDANSETRON 4 MG: 2 INJECTION INTRAMUSCULAR; INTRAVENOUS at 13:35

## 2017-06-02 RX ADMIN — HYDROMORPHONE HYDROCHLORIDE 0.5 MG: 1 INJECTION, SOLUTION INTRAMUSCULAR; INTRAVENOUS; SUBCUTANEOUS at 13:37

## 2017-06-02 RX ADMIN — ONDANSETRON 4 MG: 2 INJECTION INTRAMUSCULAR; INTRAVENOUS at 08:34

## 2017-06-02 RX ADMIN — MORPHINE SULFATE 4 MG: 4 INJECTION, SOLUTION INTRAMUSCULAR; INTRAVENOUS at 08:32

## 2017-06-02 RX ADMIN — SODIUM CHLORIDE 1000 ML: 9 INJECTION, SOLUTION INTRAVENOUS at 08:25

## 2017-06-02 RX ADMIN — INSULIN LISPRO 20 UNITS: 100 INJECTION, SUSPENSION SUBCUTANEOUS at 17:39

## 2017-06-02 RX ADMIN — INSULIN LISPRO 7 UNITS: 100 INJECTION, SOLUTION INTRAVENOUS; SUBCUTANEOUS at 21:24

## 2017-06-03 VITALS
TEMPERATURE: 97.9 F | SYSTOLIC BLOOD PRESSURE: 129 MMHG | DIASTOLIC BLOOD PRESSURE: 77 MMHG | WEIGHT: 190 LBS | HEIGHT: 67 IN | HEART RATE: 96 BPM | OXYGEN SATURATION: 99 % | BODY MASS INDEX: 29.82 KG/M2 | RESPIRATION RATE: 18 BRPM

## 2017-06-03 LAB
ANION GAP SERPL CALCULATED.3IONS-SCNC: 8 MMOL/L (ref 3–11)
BASOPHILS # BLD AUTO: 0.05 10*3/MM3 (ref 0–0.2)
BASOPHILS NFR BLD AUTO: 0.7 % (ref 0–1)
BUN BLD-MCNC: 8 MG/DL (ref 9–23)
BUN/CREAT SERPL: 13.3 (ref 7–25)
CALCIUM SPEC-SCNC: 8.6 MG/DL (ref 8.7–10.4)
CHLORIDE SERPL-SCNC: 106 MMOL/L (ref 99–109)
CO2 SERPL-SCNC: 23 MMOL/L (ref 20–31)
CREAT BLD-MCNC: 0.6 MG/DL (ref 0.6–1.3)
DEPRECATED RDW RBC AUTO: 46.7 FL (ref 37–54)
EOSINOPHIL # BLD AUTO: 0.24 10*3/MM3 (ref 0.1–0.3)
EOSINOPHIL NFR BLD AUTO: 3.2 % (ref 0–3)
ERYTHROCYTE [DISTWIDTH] IN BLOOD BY AUTOMATED COUNT: 13 % (ref 11.3–14.5)
GFR SERPL CREATININE-BSD FRML MDRD: 115 ML/MIN/1.73
GLUCOSE BLD-MCNC: 96 MG/DL (ref 70–100)
GLUCOSE BLDC GLUCOMTR-MCNC: 108 MG/DL (ref 70–130)
GLUCOSE BLDC GLUCOMTR-MCNC: 119 MG/DL (ref 70–130)
GLUCOSE BLDC GLUCOMTR-MCNC: 182 MG/DL (ref 70–130)
HBA1C MFR BLD: 12.1 % (ref 4.8–5.6)
HCT VFR BLD AUTO: 41.2 % (ref 34.5–44)
HGB BLD-MCNC: 13.1 G/DL (ref 11.5–15.5)
IMM GRANULOCYTES # BLD: 0.02 10*3/MM3 (ref 0–0.03)
IMM GRANULOCYTES NFR BLD: 0.3 % (ref 0–0.6)
LYMPHOCYTES # BLD AUTO: 2.27 10*3/MM3 (ref 0.6–4.8)
LYMPHOCYTES NFR BLD AUTO: 30 % (ref 24–44)
MCH RBC QN AUTO: 31.3 PG (ref 27–31)
MCHC RBC AUTO-ENTMCNC: 31.8 G/DL (ref 32–36)
MCV RBC AUTO: 98.3 FL (ref 80–99)
MONOCYTES # BLD AUTO: 0.54 10*3/MM3 (ref 0–1)
MONOCYTES NFR BLD AUTO: 7.1 % (ref 0–12)
NEUTROPHILS # BLD AUTO: 4.45 10*3/MM3 (ref 1.5–8.3)
NEUTROPHILS NFR BLD AUTO: 58.7 % (ref 41–71)
PLATELET # BLD AUTO: 364 10*3/MM3 (ref 150–450)
PMV BLD AUTO: 10.4 FL (ref 6–12)
POTASSIUM BLD-SCNC: 4.1 MMOL/L (ref 3.5–5.5)
RBC # BLD AUTO: 4.19 10*6/MM3 (ref 3.89–5.14)
SODIUM BLD-SCNC: 137 MMOL/L (ref 132–146)
TSH SERPL DL<=0.05 MIU/L-ACNC: 0.9 MIU/ML (ref 0.35–5.35)
WBC NRBC COR # BLD: 7.57 10*3/MM3 (ref 3.5–10.8)

## 2017-06-03 PROCEDURE — 85025 COMPLETE CBC W/AUTO DIFF WBC: CPT | Performed by: NURSE PRACTITIONER

## 2017-06-03 PROCEDURE — 83036 HEMOGLOBIN GLYCOSYLATED A1C: CPT | Performed by: NURSE PRACTITIONER

## 2017-06-03 PROCEDURE — 80048 BASIC METABOLIC PNL TOTAL CA: CPT | Performed by: NURSE PRACTITIONER

## 2017-06-03 PROCEDURE — G0378 HOSPITAL OBSERVATION PER HR: HCPCS

## 2017-06-03 PROCEDURE — 84443 ASSAY THYROID STIM HORMONE: CPT | Performed by: NURSE PRACTITIONER

## 2017-06-03 PROCEDURE — 82962 GLUCOSE BLOOD TEST: CPT

## 2017-06-03 PROCEDURE — 99217 PR OBSERVATION CARE DISCHARGE MANAGEMENT: CPT | Performed by: NURSE PRACTITIONER

## 2017-06-03 RX ADMIN — INSULIN LISPRO 2 UNITS: 100 INJECTION, SOLUTION INTRAVENOUS; SUBCUTANEOUS at 11:28

## 2017-06-03 RX ADMIN — INSULIN LISPRO 36 UNITS: 100 INJECTION, SUSPENSION SUBCUTANEOUS at 08:52

## 2017-06-03 RX ADMIN — VENLAFAXINE 150 MG: 37.5 TABLET ORAL at 08:52

## 2017-06-03 RX ADMIN — LEVOTHYROXINE SODIUM 280 MCG: 112 TABLET ORAL at 06:14

## 2017-06-03 RX ADMIN — LAMOTRIGINE 150 MG: 100 TABLET ORAL at 08:52

## 2017-06-29 ENCOUNTER — HOSPITAL ENCOUNTER (EMERGENCY)
Facility: HOSPITAL | Age: 32
Discharge: LEFT BEFORE DISCHARGE | End: 2017-06-29

## 2017-06-29 VITALS
SYSTOLIC BLOOD PRESSURE: 134 MMHG | TEMPERATURE: 98.5 F | HEART RATE: 92 BPM | DIASTOLIC BLOOD PRESSURE: 80 MMHG | BODY MASS INDEX: 29.03 KG/M2 | WEIGHT: 185 LBS | RESPIRATION RATE: 16 BRPM | OXYGEN SATURATION: 98 % | HEIGHT: 67 IN

## 2017-06-29 PROCEDURE — 99211 OFF/OP EST MAY X REQ PHY/QHP: CPT

## 2017-07-23 ENCOUNTER — HOSPITAL ENCOUNTER (EMERGENCY)
Facility: HOSPITAL | Age: 32
Discharge: LEFT BEFORE DISCHARGE | End: 2017-07-23
Attending: EMERGENCY MEDICINE

## 2017-07-23 VITALS
WEIGHT: 185 LBS | TEMPERATURE: 98.1 F | HEART RATE: 109 BPM | OXYGEN SATURATION: 99 % | SYSTOLIC BLOOD PRESSURE: 147 MMHG | RESPIRATION RATE: 18 BRPM | HEIGHT: 67 IN | BODY MASS INDEX: 29.03 KG/M2 | DIASTOLIC BLOOD PRESSURE: 82 MMHG

## 2017-07-23 PROCEDURE — 99211 OFF/OP EST MAY X REQ PHY/QHP: CPT

## 2017-08-14 ENCOUNTER — TRANSCRIBE ORDERS (OUTPATIENT)
Dept: ENDOCRINOLOGY | Facility: CLINIC | Age: 32
End: 2017-08-14

## 2017-08-14 ENCOUNTER — HOSPITAL ENCOUNTER (EMERGENCY)
Facility: HOSPITAL | Age: 32
Discharge: HOME | End: 2017-08-14
Attending: EMERGENCY MEDICINE | Admitting: EMERGENCY MEDICINE

## 2017-08-14 VITALS
HEIGHT: 67 IN | HEART RATE: 90 BPM | BODY MASS INDEX: 29.82 KG/M2 | RESPIRATION RATE: 16 BRPM | WEIGHT: 190 LBS | OXYGEN SATURATION: 99 % | SYSTOLIC BLOOD PRESSURE: 114 MMHG | DIASTOLIC BLOOD PRESSURE: 72 MMHG | TEMPERATURE: 98.8 F

## 2017-08-14 DIAGNOSIS — E10.65 HYPERGLYCEMIA DUE TO TYPE 1 DIABETES MELLITUS (HCC): Primary | ICD-10-CM

## 2017-08-14 DIAGNOSIS — E10.9 DIABETES MELLITUS TYPE 1, UNCOMPLICATED (HCC): Primary | ICD-10-CM

## 2017-08-14 DIAGNOSIS — R11.2 NON-INTRACTABLE VOMITING WITH NAUSEA, UNSPECIFIED VOMITING TYPE: ICD-10-CM

## 2017-08-14 DIAGNOSIS — E10.10 DIABETIC KETOACIDOSIS WITHOUT COMA ASSOCIATED WITH TYPE 1 DIABETES MELLITUS (HCC): ICD-10-CM

## 2017-08-14 LAB
ALBUMIN SERPL-MCNC: 4.4 G/DL (ref 3.2–4.8)
ALBUMIN/GLOB SERPL: 1.4 G/DL (ref 1.5–2.5)
ALP SERPL-CCNC: 133 U/L (ref 25–100)
ALT SERPL W P-5'-P-CCNC: 19 U/L (ref 7–40)
ANION GAP SERPL CALCULATED.3IONS-SCNC: 9 MMOL/L (ref 3–11)
AST SERPL-CCNC: 23 U/L (ref 0–33)
ATMOSPHERIC PRESS: ABNORMAL MMHG
B-HCG UR QL: NEGATIVE
B-OH-BUTYR SERPL-SCNC: 2.71 MMOL/L
BASE EXCESS BLDV CALC-SCNC: -0.8 MMOL/L (ref -2–2)
BASOPHILS # BLD AUTO: 0.06 10*3/MM3 (ref 0–0.2)
BASOPHILS NFR BLD AUTO: 0.6 % (ref 0–1)
BDY SITE: ABNORMAL
BILIRUB SERPL-MCNC: 0.4 MG/DL (ref 0.3–1.2)
BILIRUB UR QL STRIP: ABNORMAL
BUN BLD-MCNC: 18 MG/DL (ref 9–23)
BUN/CREAT SERPL: 18 (ref 7–25)
CALCIUM SPEC-SCNC: 9.8 MG/DL (ref 8.7–10.4)
CHLORIDE SERPL-SCNC: 94 MMOL/L (ref 99–109)
CLARITY UR: CLEAR
CO2 BLDA-SCNC: 27 MMOL/L (ref 22–33)
CO2 SERPL-SCNC: 27 MMOL/L (ref 20–31)
COHGB MFR BLD: 1.9 % (ref 0–2)
COLOR UR: YELLOW
CREAT BLD-MCNC: 1 MG/DL (ref 0.6–1.3)
DEPRECATED RDW RBC AUTO: 42.3 FL (ref 37–54)
EOSINOPHIL # BLD AUTO: 0.18 10*3/MM3 (ref 0–0.3)
EOSINOPHIL NFR BLD AUTO: 1.7 % (ref 0–3)
ERYTHROCYTE [DISTWIDTH] IN BLOOD BY AUTOMATED COUNT: 12.6 % (ref 11.3–14.5)
GFR SERPL CREATININE-BSD FRML MDRD: 64 ML/MIN/1.73
GLOBULIN UR ELPH-MCNC: 3.1 GM/DL
GLUCOSE BLD-MCNC: 341 MG/DL (ref 70–100)
GLUCOSE BLDC GLUCOMTR-MCNC: 285 MG/DL (ref 70–130)
GLUCOSE BLDC GLUCOMTR-MCNC: 315 MG/DL (ref 70–130)
GLUCOSE BLDC GLUCOMTR-MCNC: 320 MG/DL (ref 70–130)
GLUCOSE BLDC GLUCOMTR-MCNC: 323 MG/DL (ref 70–130)
GLUCOSE BLDC GLUCOMTR-MCNC: 330 MG/DL (ref 70–130)
GLUCOSE UR STRIP-MCNC: ABNORMAL MG/DL
HCO3 BLDV-SCNC: 25.5 MMOL/L (ref 22–28)
HCT VFR BLD AUTO: 42.3 % (ref 34.5–44)
HGB BLD-MCNC: 14.2 G/DL (ref 11.5–15.5)
HGB BLDA-MCNC: 13.9 G/DL (ref 14–18)
HGB UR QL STRIP.AUTO: NEGATIVE
HOLD SPECIMEN: NORMAL
HOLD SPECIMEN: NORMAL
HOROWITZ INDEX BLD+IHG-RTO: 21 %
IMM GRANULOCYTES # BLD: 0.03 10*3/MM3 (ref 0–0.03)
IMM GRANULOCYTES NFR BLD: 0.3 % (ref 0–0.6)
INTERNAL NEGATIVE CONTROL: NEGATIVE
INTERNAL POSITIVE CONTROL: POSITIVE
KETONES UR QL STRIP: ABNORMAL
LEUKOCYTE ESTERASE UR QL STRIP.AUTO: NEGATIVE
LYMPHOCYTES # BLD AUTO: 2.37 10*3/MM3 (ref 0.6–4.8)
LYMPHOCYTES NFR BLD AUTO: 22.1 % (ref 24–44)
Lab: NORMAL
MCH RBC QN AUTO: 30.7 PG (ref 27–31)
MCHC RBC AUTO-ENTMCNC: 33.6 G/DL (ref 32–36)
MCV RBC AUTO: 91.6 FL (ref 80–99)
METHGB BLD QL: 1.1 % (ref 0–1.5)
MODALITY: ABNORMAL
MONOCYTES # BLD AUTO: 0.61 10*3/MM3 (ref 0–1)
MONOCYTES NFR BLD AUTO: 5.7 % (ref 0–12)
NEUTROPHILS # BLD AUTO: 7.46 10*3/MM3 (ref 1.5–8.3)
NEUTROPHILS NFR BLD AUTO: 69.6 % (ref 41–71)
NITRITE UR QL STRIP: NEGATIVE
OXYHGB MFR BLDV: 67.5 % (ref 94–99)
PCO2 BLDV: 47.8 MM HG (ref 41–51)
PH BLDV: 7.34 [PH] (ref 7.25–7.5)
PH UR STRIP.AUTO: 6 [PH] (ref 5–8)
PLATELET # BLD AUTO: 472 10*3/MM3 (ref 150–450)
PMV BLD AUTO: 9.8 FL (ref 6–12)
PO2 BLDV: 37.9 MM HG (ref 27–53)
POTASSIUM BLD-SCNC: 5.1 MMOL/L (ref 3.5–5.5)
PROT SERPL-MCNC: 7.5 G/DL (ref 5.7–8.2)
PROT UR QL STRIP: NEGATIVE
RBC # BLD AUTO: 4.62 10*6/MM3 (ref 3.89–5.14)
SODIUM BLD-SCNC: 130 MMOL/L (ref 132–146)
SP GR UR STRIP: 1.03 (ref 1–1.03)
UROBILINOGEN UR QL STRIP: ABNORMAL
WBC NRBC COR # BLD: 10.71 10*3/MM3 (ref 3.5–10.8)
WHOLE BLOOD HOLD SPECIMEN: NORMAL
WHOLE BLOOD HOLD SPECIMEN: NORMAL

## 2017-08-14 PROCEDURE — 25010000002 ONDANSETRON PER 1 MG: Performed by: PHYSICIAN ASSISTANT

## 2017-08-14 PROCEDURE — 82805 BLOOD GASES W/O2 SATURATION: CPT | Performed by: PHYSICIAN ASSISTANT

## 2017-08-14 PROCEDURE — 82962 GLUCOSE BLOOD TEST: CPT

## 2017-08-14 PROCEDURE — 63710000001 INSULIN REGULAR HUMAN PER 5 UNITS: Performed by: PHYSICIAN ASSISTANT

## 2017-08-14 PROCEDURE — 99284 EMERGENCY DEPT VISIT MOD MDM: CPT

## 2017-08-14 PROCEDURE — 96375 TX/PRO/DX INJ NEW DRUG ADDON: CPT

## 2017-08-14 PROCEDURE — 36415 COLL VENOUS BLD VENIPUNCTURE: CPT

## 2017-08-14 PROCEDURE — 25010000002 HYDROMORPHONE PER 4 MG: Performed by: EMERGENCY MEDICINE

## 2017-08-14 PROCEDURE — 81003 URINALYSIS AUTO W/O SCOPE: CPT | Performed by: EMERGENCY MEDICINE

## 2017-08-14 PROCEDURE — 85025 COMPLETE CBC W/AUTO DIFF WBC: CPT | Performed by: EMERGENCY MEDICINE

## 2017-08-14 PROCEDURE — 25010000002 PROCHLORPERAZINE EDISYLATE PER 10 MG: Performed by: PHYSICIAN ASSISTANT

## 2017-08-14 PROCEDURE — 96361 HYDRATE IV INFUSION ADD-ON: CPT

## 2017-08-14 PROCEDURE — 82010 KETONE BODYS QUAN: CPT | Performed by: EMERGENCY MEDICINE

## 2017-08-14 PROCEDURE — 80053 COMPREHEN METABOLIC PANEL: CPT | Performed by: EMERGENCY MEDICINE

## 2017-08-14 PROCEDURE — 96374 THER/PROPH/DIAG INJ IV PUSH: CPT

## 2017-08-14 PROCEDURE — 25010000002 DIPHENHYDRAMINE PER 50 MG: Performed by: PHYSICIAN ASSISTANT

## 2017-08-14 RX ORDER — SODIUM CHLORIDE 0.9 % (FLUSH) 0.9 %
10 SYRINGE (ML) INJECTION AS NEEDED
Status: DISCONTINUED | OUTPATIENT
Start: 2017-08-14 | End: 2017-08-14 | Stop reason: HOSPADM

## 2017-08-14 RX ORDER — ONDANSETRON 4 MG/1
4 TABLET, ORALLY DISINTEGRATING ORAL EVERY 6 HOURS PRN
Qty: 12 TABLET | Refills: 0 | Status: SHIPPED | OUTPATIENT
Start: 2017-08-14 | End: 2018-02-20

## 2017-08-14 RX ORDER — DIPHENHYDRAMINE HYDROCHLORIDE 50 MG/ML
25 INJECTION INTRAMUSCULAR; INTRAVENOUS ONCE
Status: COMPLETED | OUTPATIENT
Start: 2017-08-14 | End: 2017-08-14

## 2017-08-14 RX ORDER — HYDROMORPHONE HYDROCHLORIDE 1 MG/ML
0.5 INJECTION, SOLUTION INTRAMUSCULAR; INTRAVENOUS; SUBCUTANEOUS ONCE
Status: COMPLETED | OUTPATIENT
Start: 2017-08-14 | End: 2017-08-14

## 2017-08-14 RX ORDER — ONDANSETRON 2 MG/ML
4 INJECTION INTRAMUSCULAR; INTRAVENOUS ONCE
Status: COMPLETED | OUTPATIENT
Start: 2017-08-14 | End: 2017-08-14

## 2017-08-14 RX ADMIN — SODIUM CHLORIDE 1000 ML: 9 INJECTION, SOLUTION INTRAVENOUS at 15:42

## 2017-08-14 RX ADMIN — DIPHENHYDRAMINE HYDROCHLORIDE 25 MG: 50 INJECTION INTRAMUSCULAR; INTRAVENOUS at 17:40

## 2017-08-14 RX ADMIN — INSULIN HUMAN 3 UNITS: 100 INJECTION, SOLUTION PARENTERAL at 17:33

## 2017-08-14 RX ADMIN — SODIUM CHLORIDE 1000 ML: 9 INJECTION, SOLUTION INTRAVENOUS at 17:40

## 2017-08-14 RX ADMIN — ONDANSETRON 4 MG: 2 INJECTION INTRAMUSCULAR; INTRAVENOUS at 15:41

## 2017-08-14 RX ADMIN — HYDROMORPHONE HYDROCHLORIDE 0.5 MG: 1 INJECTION, SOLUTION INTRAMUSCULAR; INTRAVENOUS; SUBCUTANEOUS at 15:42

## 2017-08-14 RX ADMIN — PROCHLORPERAZINE EDISYLATE 5 MG: 5 INJECTION INTRAMUSCULAR; INTRAVENOUS at 17:41

## 2017-08-14 NOTE — DISCHARGE INSTRUCTIONS
Resume your insulin as previously prescribed.  Zofran for nausea.  Follow up with your PCP (call tomorrow for next available appointment).  Return if worse.

## 2017-08-14 NOTE — ED PROVIDER NOTES
Subjective   HPI Comments: 31-year-old female presents to the emergency department with complaints of nausea, dry heaves and hyperglycemia.  The patient states that she wasn't able to  her Humalog 7525 mix and as a result has developed hyperglycemia.  She states that she checked her blood sugar this morning and it was greater than 500.  She used a urine dipstick and noted ketones in the urine.  She denies any abdominal pain.  She states that she does have a headache since this morning.  She has had some increased urinary frequency with no dysuria.  Past medical history of diabetes mellitus type 1, Graves' disease, recent shingles outbreak (treated with antivirals but not prednisone), and recent UTI.      History provided by:  Patient      Review of Systems   Constitutional: Negative for chills and fever.   HENT: Negative for congestion, ear pain, nosebleeds, rhinorrhea and sore throat.    Eyes: Negative for pain, discharge and visual disturbance.   Respiratory: Negative for shortness of breath and wheezing.    Cardiovascular: Negative for chest pain, palpitations and leg swelling.   Gastrointestinal: Positive for nausea and vomiting. Negative for abdominal pain, blood in stool and diarrhea.   Endocrine: Negative.    Genitourinary: Negative for dysuria, hematuria and urgency.   Musculoskeletal: Negative for arthralgias and back pain.   Skin: Negative for pallor and rash.   Allergic/Immunologic: Negative for immunocompromised state.   Neurological: Positive for weakness (generalized) and headaches. Negative for dizziness and speech difficulty.   Hematological: Negative for adenopathy. Does not bruise/bleed easily.   Psychiatric/Behavioral: Negative.        Past Medical History:   Diagnosis Date   • Anxiety    • Chronic low back pain    • Depression    • Diabetes mellitus     TYPE 1   • Graves disease    • Herniated lumbar intervertebral disc        Allergies   Allergen Reactions   • Sulfa Antibiotics  Anaphylaxis   • Dairy Aid [Lactase]    • Eggs Or Egg-Derived Products    • Peanut-Containing Drug Products    • Shellfish-Derived Products    • Soybean-Containing Drug Products    • Wheat Extract        Past Surgical History:   Procedure Laterality Date   •  SECTION     • ENDOMETRIAL ABLATION     • FOOT SURGERY      METAL DINA PLACED IN FOOT       Family History   Problem Relation Age of Onset   • Diabetes Father        Social History     Social History   • Marital status: Single     Spouse name: N/A   • Number of children: N/A   • Years of education: N/A     Social History Main Topics   • Smoking status: Former Smoker   • Smokeless tobacco: None      Comment: QUIT 12/15   • Alcohol use No   • Drug use: No   • Sexual activity: Defer     Other Topics Concern   • None     Social History Narrative    Live with boyfriend and son            Objective   Physical Exam   Constitutional: She is oriented to person, place, and time. She appears well-developed and well-nourished. No distress.   HENT:   Head: Normocephalic and atraumatic.   Nose: Nose normal.   Mouth/Throat: Oropharynx is clear and moist.   Eyes: EOM are normal. Pupils are equal, round, and reactive to light. No scleral icterus.   Neck: Normal range of motion. Neck supple.   Cardiovascular: Normal rate, regular rhythm, normal heart sounds and intact distal pulses.    No murmur heard.  Pulmonary/Chest: Effort normal and breath sounds normal. No respiratory distress. She has no wheezes. She has no rales. She exhibits no tenderness.   Abdominal: Soft. Bowel sounds are normal. There is no tenderness.   Musculoskeletal: Normal range of motion. She exhibits no edema or tenderness.   Neurological: She is alert and oriented to person, place, and time.   Skin: Skin is warm and dry. No rash noted. She is not diaphoretic.   Psychiatric: She has a normal mood and affect.   Nursing note and vitals reviewed.      Procedures         ED Course  ED Course    5:51 PM  Pt  feeling better after meds.  Still has a headache. Glucose is in 300s after a liter of fluid.  Will give second liter.  She has ketones in her serum, but her VBG shows pH of 7.33.  Will give 3 units regular insulin IV and plan to d/c home to resume her meds.     Recent Results (from the past 24 hour(s))   POC Glucose Fingerstick    Collection Time: 08/14/17  2:14 PM   Result Value Ref Range    Glucose 330 (H) 70 - 130 mg/dL   Comprehensive Metabolic Panel    Collection Time: 08/14/17  2:37 PM   Result Value Ref Range    Glucose 341 (H) 70 - 100 mg/dL    BUN 18 9 - 23 mg/dL    Creatinine 1.00 0.60 - 1.30 mg/dL    Sodium 130 (L) 132 - 146 mmol/L    Potassium 5.1 3.5 - 5.5 mmol/L    Chloride 94 (L) 99 - 109 mmol/L    CO2 27.0 20.0 - 31.0 mmol/L    Calcium 9.8 8.7 - 10.4 mg/dL    Total Protein 7.5 5.7 - 8.2 g/dL    Albumin 4.40 3.20 - 4.80 g/dL    ALT (SGPT) 19 7 - 40 U/L    AST (SGOT) 23 0 - 33 U/L    Alkaline Phosphatase 133 (H) 25 - 100 U/L    Total Bilirubin 0.4 0.3 - 1.2 mg/dL    eGFR Non African Amer 64 >60 mL/min/1.73    Globulin 3.1 gm/dL    A/G Ratio 1.4 (L) 1.5 - 2.5 g/dL    BUN/Creatinine Ratio 18.0 7.0 - 25.0    Anion Gap 9.0 3.0 - 11.0 mmol/L   Light Blue Top    Collection Time: 08/14/17  2:37 PM   Result Value Ref Range    Extra Tube hold for add-on    Green Top (Gel)    Collection Time: 08/14/17  2:37 PM   Result Value Ref Range    Extra Tube Hold for add-ons.    Lavender Top    Collection Time: 08/14/17  2:37 PM   Result Value Ref Range    Extra Tube hold for add-on    Gold Top - SST    Collection Time: 08/14/17  2:37 PM   Result Value Ref Range    Extra Tube Hold for add-ons.    CBC Auto Differential    Collection Time: 08/14/17  2:37 PM   Result Value Ref Range    WBC 10.71 3.50 - 10.80 10*3/mm3    RBC 4.62 3.89 - 5.14 10*6/mm3    Hemoglobin 14.2 11.5 - 15.5 g/dL    Hematocrit 42.3 34.5 - 44.0 %    MCV 91.6 80.0 - 99.0 fL    MCH 30.7 27.0 - 31.0 pg    MCHC 33.6 32.0 - 36.0 g/dL    RDW 12.6 11.3 - 14.5 %     RDW-SD 42.3 37.0 - 54.0 fl    MPV 9.8 6.0 - 12.0 fL    Platelets 472 (H) 150 - 450 10*3/mm3    Neutrophil % 69.6 41.0 - 71.0 %    Lymphocyte % 22.1 (L) 24.0 - 44.0 %    Monocyte % 5.7 0.0 - 12.0 %    Eosinophil % 1.7 0.0 - 3.0 %    Basophil % 0.6 0.0 - 1.0 %    Immature Grans % 0.3 0.0 - 0.6 %    Neutrophils, Absolute 7.46 1.50 - 8.30 10*3/mm3    Lymphocytes, Absolute 2.37 0.60 - 4.80 10*3/mm3    Monocytes, Absolute 0.61 0.00 - 1.00 10*3/mm3    Eosinophils, Absolute 0.18 0.00 - 0.30 10*3/mm3    Basophils, Absolute 0.06 0.00 - 0.20 10*3/mm3    Immature Grans, Absolute 0.03 0.00 - 0.03 10*3/mm3   Beta Hydroxybutyrate Quantitative    Collection Time: 08/14/17  2:37 PM   Result Value Ref Range    Beta-Hydroxybutyrate Quant 2.710 (H) <=0.500 mmol/L   POC Glucose Fingerstick    Collection Time: 08/14/17  3:04 PM   Result Value Ref Range    Glucose 315 (H) 70 - 130 mg/dL   Urinalysis With / Culture If Indicated    Collection Time: 08/14/17  3:06 PM   Result Value Ref Range    Color, UA Yellow Yellow, Straw    Appearance, UA Clear Clear    pH, UA 6.0 5.0 - 8.0    Specific Gravity, UA 1.026 1.001 - 1.030    Glucose, UA >=1000 mg/dL (3+) (A) Negative    Ketones, UA 80 mg/dL (3+) (A) Negative    Bilirubin, UA Small (1+) (A) Negative    Blood, UA Negative Negative    Protein, UA Negative Negative    Leuk Esterase, UA Negative Negative    Nitrite, UA Negative Negative    Urobilinogen, UA 0.2 E.U./dL 0.2 - 1.0 E.U./dL   POCT pregnancy, urine    Collection Time: 08/14/17  3:15 PM   Result Value Ref Range    HCG, Urine, QL Negative Negative    Lot Number [id number]     Internal Positive Control Positive     Internal Negative Control Negative    POC Glucose Fingerstick    Collection Time: 08/14/17  4:17 PM   Result Value Ref Range    Glucose 323 (H) 70 - 130 mg/dL   POC Glucose Fingerstick    Collection Time: 08/14/17  5:13 PM   Result Value Ref Range    Glucose 320 (H) 70 - 130 mg/dL   Blood Gas, Venous    Collection Time:  08/14/17  5:29 PM   Result Value Ref Range    Site Venous     pH, Venous 7.336 7.250 - 7.500    pCO2, Venous 47.8 41.0 - 51.0 mm Hg    pO2, Venous 37.9 27.0 - 53.0 mm Hg    HCO3, Venous 25.5 22.0 - 28.0 mmol/L    Base Excess, Venous -0.8 -2.0 - 2.0 mmol/L    Hemoglobin, Blood Gas 13.9 (L) 14 - 18 g/dL    Oxyhemoglobin 67.5 (L) 94 - 99 %    Methemoglobin 1.10 0.00 - 1.50 %    Carboxyhemoglobin 1.9 0 - 2 %    CO2 Content 27.0 22 - 33    Barometric Pressure for Blood Gas  mmHg    Modality Room air     FIO2 21 %     Note: In addition to lab results from this visit, the labs listed above may include labs taken at another facility or during a different encounter within the last 24 hours. Please correlate lab times with ED admission and discharge times for further clarification of the services performed during this visit.    No orders to display     Vitals:    08/14/17 1649 08/14/17 1650 08/14/17 1730 08/14/17 1731   BP: 124/72  116/57    Pulse:  96  91   Resp:       Temp:       SpO2:  99% 100% 96%   Weight:       Height:         Medications   sodium chloride 0.9 % flush 10 mL (not administered)   sodium chloride 0.9 % bolus 1,000 mL (0 mL Intravenous Stopped 8/14/17 1743)   ondansetron (ZOFRAN) injection 4 mg (4 mg Intravenous Given 8/14/17 1541)   HYDROmorphone (DILAUDID) injection 0.5 mg (0.5 mg Intravenous Given 8/14/17 1542)   sodium chloride 0.9 % bolus 1,000 mL (1,000 mL Intravenous New Bag 8/14/17 1740)   insulin regular (humuLIN R,novoLIN R) injection 3 Units (3 Units Intravenous Given 8/14/17 1733)   prochlorperazine (COMPAZINE) injection 5 mg (5 mg Intravenous Given 8/14/17 1741)   diphenhydrAMINE (BENADRYL) injection 25 mg (25 mg Intravenous Given 8/14/17 1740)     ECG/EMG Results (last 24 hours)     ** No results found for the last 24 hours. **                      [facility]    Final diagnoses:   Hyperglycemia due to type 1 diabetes mellitus   Diabetic ketoacidosis without coma associated with type 1 diabetes  mellitus   Non-intractable vomiting with nausea, unspecified vomiting type            SANIYA Garnica  08/14/17 3384

## 2017-10-15 ENCOUNTER — APPOINTMENT (OUTPATIENT)
Dept: CT IMAGING | Facility: HOSPITAL | Age: 32
End: 2017-10-15

## 2017-10-15 ENCOUNTER — HOSPITAL ENCOUNTER (EMERGENCY)
Facility: HOSPITAL | Age: 32
Discharge: HOME | End: 2017-10-15
Attending: EMERGENCY MEDICINE | Admitting: EMERGENCY MEDICINE

## 2017-10-15 VITALS
TEMPERATURE: 98.5 F | RESPIRATION RATE: 24 BRPM | SYSTOLIC BLOOD PRESSURE: 121 MMHG | DIASTOLIC BLOOD PRESSURE: 86 MMHG | HEIGHT: 67 IN | OXYGEN SATURATION: 98 % | BODY MASS INDEX: 30.13 KG/M2 | WEIGHT: 192 LBS | HEART RATE: 97 BPM

## 2017-10-15 DIAGNOSIS — M54.16 LUMBAR RADICULOPATHY: Primary | ICD-10-CM

## 2017-10-15 LAB
B-HCG UR QL: NEGATIVE
INTERNAL NEGATIVE CONTROL: NEGATIVE
INTERNAL POSITIVE CONTROL: POSITIVE
Lab: NORMAL

## 2017-10-15 PROCEDURE — 25010000002 METHYLPREDNISOLONE PER 125 MG: Performed by: EMERGENCY MEDICINE

## 2017-10-15 PROCEDURE — 25010000002 HYDROMORPHONE PER 4 MG: Performed by: EMERGENCY MEDICINE

## 2017-10-15 PROCEDURE — 96376 TX/PRO/DX INJ SAME DRUG ADON: CPT

## 2017-10-15 PROCEDURE — 25010000002 ONDANSETRON PER 1 MG: Performed by: EMERGENCY MEDICINE

## 2017-10-15 PROCEDURE — 99283 EMERGENCY DEPT VISIT LOW MDM: CPT

## 2017-10-15 PROCEDURE — 25010000002 KETOROLAC TROMETHAMINE PER 15 MG: Performed by: EMERGENCY MEDICINE

## 2017-10-15 PROCEDURE — 72131 CT LUMBAR SPINE W/O DYE: CPT

## 2017-10-15 PROCEDURE — 96375 TX/PRO/DX INJ NEW DRUG ADDON: CPT

## 2017-10-15 PROCEDURE — 96374 THER/PROPH/DIAG INJ IV PUSH: CPT

## 2017-10-15 RX ORDER — HYDROCODONE BITARTRATE AND ACETAMINOPHEN 7.5; 325 MG/1; MG/1
1 TABLET ORAL EVERY 6 HOURS PRN
Qty: 12 TABLET | Refills: 0 | Status: SHIPPED | OUTPATIENT
Start: 2017-10-15 | End: 2018-02-20

## 2017-10-15 RX ORDER — KETOROLAC TROMETHAMINE 30 MG/ML
30 INJECTION, SOLUTION INTRAMUSCULAR; INTRAVENOUS ONCE
Status: COMPLETED | OUTPATIENT
Start: 2017-10-15 | End: 2017-10-15

## 2017-10-15 RX ORDER — ONDANSETRON 2 MG/ML
4 INJECTION INTRAMUSCULAR; INTRAVENOUS ONCE
Status: COMPLETED | OUTPATIENT
Start: 2017-10-15 | End: 2017-10-15

## 2017-10-15 RX ORDER — PREDNISONE 20 MG/1
20 TABLET ORAL
Qty: 15 TABLET | Refills: 0 | Status: SHIPPED | OUTPATIENT
Start: 2017-10-15 | End: 2018-02-20

## 2017-10-15 RX ORDER — HYDROMORPHONE HYDROCHLORIDE 1 MG/ML
1 INJECTION, SOLUTION INTRAMUSCULAR; INTRAVENOUS; SUBCUTANEOUS ONCE
Status: COMPLETED | OUTPATIENT
Start: 2017-10-15 | End: 2017-10-15

## 2017-10-15 RX ORDER — SODIUM CHLORIDE 0.9 % (FLUSH) 0.9 %
10 SYRINGE (ML) INJECTION AS NEEDED
Status: DISCONTINUED | OUTPATIENT
Start: 2017-10-15 | End: 2017-10-15 | Stop reason: HOSPADM

## 2017-10-15 RX ORDER — METHYLPREDNISOLONE SODIUM SUCCINATE 125 MG/2ML
125 INJECTION, POWDER, LYOPHILIZED, FOR SOLUTION INTRAMUSCULAR; INTRAVENOUS ONCE
Status: COMPLETED | OUTPATIENT
Start: 2017-10-15 | End: 2017-10-15

## 2017-10-15 RX ADMIN — METHYLPREDNISOLONE SODIUM SUCCINATE 125 MG: 125 INJECTION, POWDER, FOR SOLUTION INTRAMUSCULAR; INTRAVENOUS at 20:53

## 2017-10-15 RX ADMIN — HYDROMORPHONE HYDROCHLORIDE 1 MG: 1 INJECTION, SOLUTION INTRAMUSCULAR; INTRAVENOUS; SUBCUTANEOUS at 20:53

## 2017-10-15 RX ADMIN — ONDANSETRON 4 MG: 2 INJECTION INTRAMUSCULAR; INTRAVENOUS at 19:07

## 2017-10-15 RX ADMIN — KETOROLAC TROMETHAMINE 30 MG: 30 INJECTION, SOLUTION INTRAMUSCULAR at 19:07

## 2017-10-15 RX ADMIN — ONDANSETRON 4 MG: 2 INJECTION INTRAMUSCULAR; INTRAVENOUS at 20:53

## 2017-10-16 NOTE — DISCHARGE INSTRUCTIONS
Return if any numbness to your genital region, loss of bowel or bladder control, inability to walk, or problems sooner.

## 2017-10-16 NOTE — ED PROVIDER NOTES
"Subjective   HPI Comments: Patient is a 31 year old white female who complains of low back and right leg pain.     Patient states that her low back and right leg have been hurting for three weeks. Patient states the pain is 10/10, sharp and affects her right knee and radiates up to her hip. Patient states that her right ankle has been \"giving out\" and that her right foot and toes are tingling and numb occasionally. Patient has no other complaints at this time.     Patient is a 31 y.o. female presenting with back pain.   History provided by:  Patient  Back Pain   Location:  Lumbar spine  Quality:  Shooting and stabbing  Radiates to:  R thigh  Pain severity:  Severe  Pain is:  Same all the time  Onset quality:  Sudden  Timing:  Constant  Progression:  Unchanged  Chronicity:  New  Relieved by:  Nothing  Worsened by:  Coughing  Ineffective treatments:  NSAIDs, OTC medications, cold packs, heating pad and being still  Associated symptoms: fever, leg pain, numbness, tingling and weakness    Associated symptoms: no bladder incontinence, no bowel incontinence, no headaches, no pelvic pain and no perianal numbness        Review of Systems   Constitutional: Positive for fever.   Gastrointestinal: Negative for bowel incontinence.   Genitourinary: Negative for bladder incontinence and pelvic pain.   Musculoskeletal: Positive for back pain.   Neurological: Positive for tingling, weakness and numbness. Negative for headaches.   All other systems reviewed and are negative.      Past Medical History:   Diagnosis Date   • Anxiety    • Chronic low back pain    • Depression    • Diabetes mellitus     TYPE 1   • Graves disease    • Herniated lumbar intervertebral disc        Allergies   Allergen Reactions   • Sulfa Antibiotics Anaphylaxis   • Dairy Aid [Lactase]    • Eggs Or Egg-Derived Products    • Peanut-Containing Drug Products    • Shellfish-Derived Products    • Soybean-Containing Drug Products    • Wheat Extract        Past " Surgical History:   Procedure Laterality Date   •  SECTION     • ENDOMETRIAL ABLATION     • FOOT SURGERY      METAL DINA PLACED IN FOOT       Family History   Problem Relation Age of Onset   • Diabetes Father        Social History     Social History   • Marital status: Single     Spouse name: N/A   • Number of children: N/A   • Years of education: N/A     Social History Main Topics   • Smoking status: Former Smoker   • Smokeless tobacco: None      Comment: QUIT 12/15   • Alcohol use No   • Drug use: No   • Sexual activity: Defer     Other Topics Concern   • None     Social History Narrative    Live with boyfriend and son            Objective   Physical Exam   Constitutional: She is oriented to person, place, and time. She appears well-developed and well-nourished.   HENT:   Head: Normocephalic and atraumatic.   Eyes: EOM are normal. Pupils are equal, round, and reactive to light.   Neck: Normal range of motion. Neck supple.   Cardiovascular: Normal rate, regular rhythm, normal heart sounds and intact distal pulses.    Pulmonary/Chest: Effort normal and breath sounds normal.   Abdominal: Soft. Bowel sounds are normal.   Genitourinary:   Genitourinary Comments: Normal sensation in perianal region.   Normal rectal tone   Musculoskeletal: Normal range of motion.   Neurological: She is alert and oriented to person, place, and time. She has normal reflexes.   Skin: Skin is warm and dry.   Psychiatric: She has a normal mood and affect. Her behavior is normal. Judgment and thought content normal.       Procedures         ED Course  ED Course   Comment By Time   Patient with pain relief and no other complaints. SANIYA Kaur 10/15 2133          Recent Results (from the past 24 hour(s))   POCT Pregnancy, urine    Collection Time: 10/15/17  7:10 PM   Result Value Ref Range    HCG, Urine, QL Negative Negative    Lot Number [id number]     Internal Positive Control Positive     Internal Negative Control Negative   "    Note: In addition to lab results from this visit, the labs listed above may include labs taken at another facility or during a different encounter within the last 24 hours. Please correlate lab times with ED admission and discharge times for further clarification of the services performed during this visit.    CT Lumbar Spine Without Contrast   Final Result      1.  No acute fracture.      2.  Degenerative changes as described above. Consider MRI for more definitive    evaluation.            THIS DOCUMENT HAS BEEN ELECTRONICALLY SIGNED BY VERONICA MARTINES MD        Vitals:    10/15/17 1736 10/15/17 1742 10/15/17 2054   BP:  126/86 127/75   BP Location:  Right arm    Patient Position:  Lying    Pulse:  120 97   Resp:  24    Temp:  98.5 °F (36.9 °C)    TempSrc:  Oral    SpO2:  100% 98%   Weight: 192 lb (87.1 kg)     Height: 67\" (170.2 cm)       Medications   sodium chloride 0.9 % flush 10 mL (not administered)   ketorolac (TORADOL) injection 30 mg (30 mg Intravenous Given 10/15/17 1907)   ondansetron (ZOFRAN) injection 4 mg (4 mg Intravenous Given 10/15/17 1907)   HYDROmorphone (DILAUDID) injection 1 mg (1 mg Intravenous Given 10/15/17 2053)   ondansetron (ZOFRAN) injection 4 mg (4 mg Intravenous Given 10/15/17 2053)   methylPREDNISolone sodium succinate (SOLU-Medrol) injection 125 mg (125 mg Intravenous Given 10/15/17 2053)     ECG/EMG Results (last 24 hours)     ** No results found for the last 24 hours. **              [facility]    Final diagnoses:   Lumbar radiculopathy            SANIYA Kaur  10/15/17 2142    "

## 2018-02-20 ENCOUNTER — OFFICE VISIT (OUTPATIENT)
Dept: INTERNAL MEDICINE | Facility: CLINIC | Age: 33
End: 2018-02-20

## 2018-02-20 VITALS
HEART RATE: 99 BPM | BODY MASS INDEX: 30.83 KG/M2 | WEIGHT: 196.4 LBS | DIASTOLIC BLOOD PRESSURE: 80 MMHG | RESPIRATION RATE: 16 BRPM | SYSTOLIC BLOOD PRESSURE: 128 MMHG | TEMPERATURE: 98.5 F | HEIGHT: 67 IN | OXYGEN SATURATION: 100 %

## 2018-02-20 DIAGNOSIS — Z88.9 MULTIPLE ALLERGIES: ICD-10-CM

## 2018-02-20 DIAGNOSIS — F19.10 IV DRUG ABUSE (HCC): ICD-10-CM

## 2018-02-20 DIAGNOSIS — E10.9 TYPE 1 DIABETES MELLITUS WITHOUT COMPLICATION (HCC): Primary | ICD-10-CM

## 2018-02-20 DIAGNOSIS — E05.00 GRAVES DISEASE: ICD-10-CM

## 2018-02-20 LAB
ALBUMIN SERPL-MCNC: 4.3 G/DL (ref 3.2–4.8)
ALBUMIN/GLOB SERPL: 1.9 G/DL (ref 1.5–2.5)
ALP SERPL-CCNC: 98 U/L (ref 25–100)
ALT SERPL W P-5'-P-CCNC: 26 U/L (ref 7–40)
ANION GAP SERPL CALCULATED.3IONS-SCNC: 7 MMOL/L (ref 3–11)
AST SERPL-CCNC: 23 U/L (ref 0–33)
BASOPHILS # BLD AUTO: 0.06 10*3/MM3 (ref 0–0.2)
BASOPHILS NFR BLD AUTO: 0.8 % (ref 0–1)
BILIRUB SERPL-MCNC: 0.3 MG/DL (ref 0.3–1.2)
BUN BLD-MCNC: 8 MG/DL (ref 9–23)
BUN/CREAT SERPL: 10 (ref 7–25)
CALCIUM SPEC-SCNC: 9.5 MG/DL (ref 8.7–10.4)
CHLORIDE SERPL-SCNC: 97 MMOL/L (ref 99–109)
CO2 SERPL-SCNC: 31 MMOL/L (ref 20–31)
CREAT BLD-MCNC: 0.8 MG/DL (ref 0.6–1.3)
DEPRECATED RDW RBC AUTO: 44.9 FL (ref 37–54)
EOSINOPHIL # BLD AUTO: 0.17 10*3/MM3 (ref 0–0.3)
EOSINOPHIL NFR BLD AUTO: 2.3 % (ref 0–3)
ERYTHROCYTE [DISTWIDTH] IN BLOOD BY AUTOMATED COUNT: 12.9 % (ref 11.3–14.5)
GFR SERPL CREATININE-BSD FRML MDRD: 82 ML/MIN/1.73
GLOBULIN UR ELPH-MCNC: 2.3 GM/DL
GLUCOSE BLD-MCNC: 253 MG/DL (ref 70–100)
HBA1C MFR BLD: 11.8 %
HCT VFR BLD AUTO: 42.3 % (ref 34.5–44)
HCV AB SER DONR QL: NORMAL
HGB BLD-MCNC: 14 G/DL (ref 11.5–15.5)
HIV1+2 AB SER QL: NORMAL
IMM GRANULOCYTES # BLD: 0.02 10*3/MM3 (ref 0–0.03)
IMM GRANULOCYTES NFR BLD: 0.3 % (ref 0–0.6)
LYMPHOCYTES # BLD AUTO: 1.76 10*3/MM3 (ref 0.6–4.8)
LYMPHOCYTES NFR BLD AUTO: 23.3 % (ref 24–44)
MCH RBC QN AUTO: 31.6 PG (ref 27–31)
MCHC RBC AUTO-ENTMCNC: 33.1 G/DL (ref 32–36)
MCV RBC AUTO: 95.5 FL (ref 80–99)
MONOCYTES # BLD AUTO: 0.4 10*3/MM3 (ref 0–1)
MONOCYTES NFR BLD AUTO: 5.3 % (ref 0–12)
NEUTROPHILS # BLD AUTO: 5.14 10*3/MM3 (ref 1.5–8.3)
NEUTROPHILS NFR BLD AUTO: 68 % (ref 41–71)
PLATELET # BLD AUTO: 409 10*3/MM3 (ref 150–450)
PMV BLD AUTO: 10.6 FL (ref 6–12)
POTASSIUM BLD-SCNC: 4.6 MMOL/L (ref 3.5–5.5)
PROT SERPL-MCNC: 6.6 G/DL (ref 5.7–8.2)
RBC # BLD AUTO: 4.43 10*6/MM3 (ref 3.89–5.14)
SODIUM BLD-SCNC: 135 MMOL/L (ref 132–146)
T4 FREE SERPL-MCNC: 1.11 NG/DL (ref 0.89–1.76)
TSH SERPL DL<=0.05 MIU/L-ACNC: 4.62 MIU/ML (ref 0.35–5.35)
WBC NRBC COR # BLD: 7.55 10*3/MM3 (ref 3.5–10.8)

## 2018-02-20 PROCEDURE — 84439 ASSAY OF FREE THYROXINE: CPT | Performed by: NURSE PRACTITIONER

## 2018-02-20 PROCEDURE — 84443 ASSAY THYROID STIM HORMONE: CPT | Performed by: NURSE PRACTITIONER

## 2018-02-20 PROCEDURE — 80053 COMPREHEN METABOLIC PANEL: CPT | Performed by: NURSE PRACTITIONER

## 2018-02-20 PROCEDURE — 99203 OFFICE O/P NEW LOW 30 MIN: CPT | Performed by: NURSE PRACTITIONER

## 2018-02-20 PROCEDURE — 86803 HEPATITIS C AB TEST: CPT | Performed by: NURSE PRACTITIONER

## 2018-02-20 PROCEDURE — 83036 HEMOGLOBIN GLYCOSYLATED A1C: CPT | Performed by: NURSE PRACTITIONER

## 2018-02-20 PROCEDURE — G0432 EIA HIV-1/HIV-2 SCREEN: HCPCS | Performed by: NURSE PRACTITIONER

## 2018-02-20 PROCEDURE — 85025 COMPLETE CBC W/AUTO DIFF WBC: CPT | Performed by: NURSE PRACTITIONER

## 2018-02-20 PROCEDURE — 84481 FREE ASSAY (FT-3): CPT | Performed by: NURSE PRACTITIONER

## 2018-02-20 RX ORDER — EPINEPHRINE 0.3 MG/.3ML
0.3 INJECTION SUBCUTANEOUS ONCE
Qty: 1 EACH | Refills: 1 | Status: SHIPPED | OUTPATIENT
Start: 2018-02-20 | End: 2018-02-20

## 2018-02-20 RX ORDER — BUPROPION HYDROCHLORIDE 200 MG/1
TABLET, EXTENDED RELEASE ORAL
COMMUNITY
Start: 2018-02-01 | End: 2021-01-10

## 2018-02-20 NOTE — PROGRESS NOTES
Subjective   Marisol Cuellar is a 32 y.o. female    Chief Complaint   Patient presents with   • Establish Care     Needs Endo referral and also get Blood work to check for HIV and Hep C. Former IV drug user. 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 Needs Epi-pen for Peanut allergy   • Diabetes     Type 1. Needs A1C checked     History of Present Illness     New pt here to establish    DM I - dx'd at age 9.  Had an insulin pump in the past and did well with this.  She is currently using Humalog 75/25 and using 20-30 units BID.  Not checking FS's consistently.  States that last A1C was > 2 years ago and was > 12.%.  A1C today is 11.8%    IV drug user - she is currently 90 days sober.  She has quit through NA and goes to a meeting every day.      Graves disease - currently taking Levothyroxine 112mcg, 1.5mg daily; has not had labs in several years.    Anxiety - followed by [facility], [place], [place]. Currently taking Wellbutrin SR 200mg daily, Lamictal 150mg daily, and Effexor 150mg daily.  She has a script for Klonopin, but does not take on reg basis.      Past Medical History:   Diagnosis Date   • Anxiety    • Chronic low back pain    • Depression    • Diabetes mellitus     TYPE 1   • Graves disease    • Herniated lumbar intervertebral disc    • IV drug abuse      Past Surgical History:   Procedure Laterality Date   •  SECTION       and    • ENDOMETRIAL ABLATION     • FOOT SURGERY  2014    METAL DINA PLACED IN FOOT   • SINUS SURGERY      ,    • TONSILLECTOMY  2006     Allergies   Allergen Reactions   • Sulfa Antibiotics Anaphylaxis   • Peanut-Containing Drug Products Other (See Comments)     Coughing and tightness in chest   • Shellfish-Derived Products Other (See Comments)     Itchy throat and tightness   • Dairy Aid [Lactase] Rash     migraines   • Eggs Or Egg-Derived Products Rash     migraines   • Soybean-Containing Drug Products Rash     migraines   • Wheat Extract Rash     migraines     Family History   Problem  Relation Age of Onset   • Heart attack Father 57   • Diabetes type I Father    • Macular degeneration Mother    • Fibromyalgia Mother    • Diabetes type II Brother    • Congenital heart disease Son      hypertrophic cardiomyopathy     Social History     Social History   • Marital status: Single     Spouse name: N/A   • Number of children: N/A   • Years of education: N/A     Occupational History   • Not on file.     Social History Main Topics   • Smoking status: Former Smoker   • Smokeless tobacco: Never Used      Comment: QUIT 11/2017   • Alcohol use No   • Drug use: No      Comment: In recovery. 90 days now. IV drug use and Marijuana   • Sexual activity: Defer      Comment:      Other Topics Concern   • Not on file     Social History Narrative    Live with boyfriend and son            The following portions of the patient's history were reviewed and updated as appropriate: allergies, current medications, past family history, past medical history, past social history, past surgical history and problem list.    Current Outpatient Prescriptions:   •  buPROPion SR (WELLBUTRIN SR) 200 MG 12 hr tablet, 1 po qd, Disp: , Rfl:   •  cetirizine (zyrTEC) 10 MG tablet, Take 10 mg by mouth Every Night., Disp: , Rfl:   •  clonazePAM (KlonoPIN) 1 MG tablet, Take 1 mg by mouth 2 (Two) Times a Day As Needed for Seizures., Disp: , Rfl:   •  insulin lispro protamine-insulin lispro (humaLOG 75-25) (75-25) 100 UNIT/ML suspension injection, Inject 40 Units under the skin 2 (Two) Times a Day With Meals. (Patient taking differently: Inject  under the skin. Using any where from 20-30 units twice daily), Disp: 10 mL, Rfl: 1  •  lamoTRIgine (LAMICTAL) 150 MG tablet, Take 150 mg by mouth Daily., Disp: , Rfl:   •  levothyroxine (SYNTHROID, LEVOTHROID) 112 MCG tablet, Take 112 mcg by mouth Daily. Takes 2.5 pills daily, Disp: , Rfl:   •  venlafaxine (EFFEXOR) 75 MG tablet, Take 150 mg by mouth 2 (Two) Times a Day., Disp: , Rfl:   •   "EPINEPHrine (EPIPEN 2-DMITRIY) 0.3 MG/0.3ML solution auto-injector injection, Inject 0.3 mL into the shoulder, thigh, or buttocks 1 (One) Time for 1 dose. To the ER after administration., Disp: 1 each, Rfl: 1     Review of Systems   Constitutional: Negative for chills, fatigue and fever.   Respiratory: Negative for cough, chest tightness and shortness of breath.    Cardiovascular: Negative for chest pain.   Gastrointestinal: Negative for abdominal pain, diarrhea, nausea and vomiting.   Endocrine: Negative for cold intolerance and heat intolerance.   Musculoskeletal: Negative for arthralgias.   Neurological: Negative for dizziness.       Objective   Physical Exam   Constitutional: She is oriented to person, place, and time. She appears well-developed and well-nourished.   HENT:   Head: Normocephalic and atraumatic.   Eyes: Conjunctivae and EOM are normal. Pupils are equal, round, and reactive to light.   Neck: Normal range of motion.   Cardiovascular: Normal rate, regular rhythm and normal heart sounds.    Pulmonary/Chest: Effort normal and breath sounds normal.   Abdominal: Soft. Bowel sounds are normal.   Musculoskeletal: Normal range of motion.   Neurological: She is alert and oriented to person, place, and time. She has normal reflexes.   Skin: Skin is warm and dry.   Psychiatric: She has a normal mood and affect. Her behavior is normal. Judgment and thought content normal.     Vitals:    02/20/18 1031   BP: 128/80   Pulse: 99   Resp: 16   Temp: 98.5 °F (36.9 °C)   TempSrc: Temporal Artery    SpO2: 100%   Weight: 89.1 kg (196 lb 6.4 oz)   Height: 170.2 cm (67.01\")         Assessment/Plan   Marisol was seen today for establish care and diabetes.    Diagnoses and all orders for this visit:    Type 1 diabetes mellitus without complication  -     POC Glycosylated Hemoglobin (Hb A1C)  -     Ambulatory Referral to Endocrinology  -     CBC & Differential  -     Comprehensive Metabolic Panel  -     Hepatitis C Antibody  -    "  TSH  -     T4, Free  -     T3, Free  -     HIV-1 / O / 2 Ag / Antibody 4th Generation  -     CBC Auto Differential    Graves disease  -     Ambulatory Referral to Endocrinology  -     CBC & Differential  -     Comprehensive Metabolic Panel  -     Hepatitis C Antibody  -     TSH  -     T4, Free  -     T3, Free  -     HIV-1 / O / 2 Ag / Antibody 4th Generation  -     CBC Auto Differential    IV drug abuse  -     CBC & Differential  -     Comprehensive Metabolic Panel  -     Hepatitis C Antibody  -     TSH  -     T4, Free  -     T3, Free  -     HIV-1 / O / 2 Ag / Antibody 4th Generation  -     CBC Auto Differential    Multiple allergies  -     EPINEPHrine (EPIPEN 2-DMITRIY) 0.3 MG/0.3ML solution auto-injector injection; Inject 0.3 mL into the shoulder, thigh, or buttocks 1 (One) Time for 1 dose. To the ER after administration.      Will ck labs today  Epi pen refilled per request for multiple allergies  Humalog 30units BID; referred to Endo for urgent referral  HIV and Hep C sent today per pt request  F/U with me for PE in 4-5 weeks or sooner with any problems

## 2018-02-21 LAB — T3FREE SERPL-MCNC: 2.1 PG/ML (ref 2–4.4)

## 2018-02-28 ENCOUNTER — TELEPHONE (OUTPATIENT)
Dept: INTERNAL MEDICINE | Facility: CLINIC | Age: 33
End: 2018-02-28

## 2018-02-28 NOTE — TELEPHONE ENCOUNTER
----- Message from JOE Roach sent at 2/26/2018 10:13 AM EST -----  All labs are WNL with the exception of her BG.  Does she have an appt to see Endo yet?

## 2018-03-05 ENCOUNTER — OFFICE VISIT (OUTPATIENT)
Dept: ENDOCRINOLOGY | Facility: CLINIC | Age: 33
End: 2018-03-05

## 2018-03-05 VITALS
DIASTOLIC BLOOD PRESSURE: 82 MMHG | SYSTOLIC BLOOD PRESSURE: 118 MMHG | OXYGEN SATURATION: 98 % | HEART RATE: 96 BPM | BODY MASS INDEX: 31.34 KG/M2 | HEIGHT: 67 IN | WEIGHT: 199.7 LBS

## 2018-03-05 DIAGNOSIS — E89.0 POSTABLATIVE HYPOTHYROIDISM: ICD-10-CM

## 2018-03-05 DIAGNOSIS — E10.65 TYPE 1 DIABETES MELLITUS WITH HYPERGLYCEMIA (HCC): Primary | ICD-10-CM

## 2018-03-05 PROCEDURE — 84681 ASSAY OF C-PEPTIDE: CPT | Performed by: PHYSICIAN ASSISTANT

## 2018-03-05 PROCEDURE — 99214 OFFICE O/P EST MOD 30 MIN: CPT | Performed by: PHYSICIAN ASSISTANT

## 2018-03-05 RX ORDER — DICLOFENAC SODIUM 75 MG/1
TABLET, DELAYED RELEASE ORAL
Refills: 0 | COMMUNITY
Start: 2017-12-18 | End: 2018-05-03

## 2018-03-05 RX ORDER — FLUTICASONE PROPIONATE 50 MCG
SPRAY, SUSPENSION (ML) NASAL
Refills: 3 | COMMUNITY
Start: 2017-12-18 | End: 2018-03-12 | Stop reason: SDUPTHER

## 2018-03-05 RX ORDER — LEVOTHYROXINE SODIUM 300 UG/1
300 TABLET ORAL DAILY
Qty: 30 TABLET | Refills: 3 | Status: SHIPPED | OUTPATIENT
Start: 2018-03-05 | End: 2018-08-07 | Stop reason: SDUPTHER

## 2018-03-05 NOTE — PROGRESS NOTES
Chief Complaint  Establish care for Diabetes Mellitus.    HPI   Marisol Cuellar is a 32 y.o. female who is here today for evaluation of Diabetes Mellitus type 1. Patient was referred by Mindy Andrews AP*. The initial diagnosis of diabetes was made at age 9.    Previously on Medtronic pump, about 10 years ago. Worked well for her and would like to try again.  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   Labs reviewed:   2/20/18- A1c 11.8, GFR 82, TSH 4.6, FT4 1.11, HIV and hep c screen negative, h/h wnl    Diabetic complications: none  Eye exam current (within one year): no. Has appt scheduled.  Foot care and dental care: discussed    Current diabetic medications include:  Humalog 75/25 30U ac BID- doesn't like it. Makes management hard as she doesn't always feel like eating.     Statin: no, <40 yoa    Past medications: different insulins, pump    Diabetic Monitoring  - checks glucose 4-5x/day  Glucose is ranging-   Hypoglycemia- some. Treats lows with 12oz juice.  Home blood sugar records: glucometer downloaded, data reviewed and scanned to chart    Nutrition:     Current diet: on average, 2-3 meals per day. No sugary drinks. Does know how to count carbs.  Current exercise: none  Seen RD in past: yes    Has hypothyroidism. Labs as above. Currently on generic levothyroxine. Hx jackie sims, s/p ZHU 1996.     The following portions of the patient's history were reviewed and updated by me as appropriate: allergies, current medications, past family history, past social history, past surgical history and problem list.    Past Medical History:   Diagnosis Date   • Anxiety    • Chronic low back pain    • Depression    • Diabetes mellitus     TYPE 1   • Graves disease    • Herniated lumbar intervertebral disc    • IV drug abuse        Medications    Current Outpatient Prescriptions:   •  buPROPion SR (WELLBUTRIN SR) 200 MG 12 hr tablet, 1 po qd, Disp: , Rfl:   •  cetirizine (zyrTEC) 10 MG tablet, Take 10 mg by mouth  Every Night., Disp: , Rfl:   •  clonazePAM (KlonoPIN) 1 MG tablet, Take 1 mg by mouth 2 (Two) Times a Day As Needed for Seizures., Disp: , Rfl:   •  lamoTRIgine (LAMICTAL) 150 MG tablet, Take 150 mg by mouth Daily., Disp: , Rfl:   •  venlafaxine (EFFEXOR) 75 MG tablet, Take 150 mg by mouth 2 (Two) Times a Day., Disp: , Rfl:   •  diclofenac (VOLTAREN) 75 MG EC tablet, TK ONE T PO QD, Disp: , Rfl: 0  •  fluticasone (FLONASE) 50 MCG/ACT nasal spray, SHAKE LQ AND U 1 SPR IEN QD, Disp: , Rfl: 3  •  insulin detemir (LEVEMIR FLEXTOUCH) 100 UNIT/ML injection, Inject 30 Units under the skin Daily., Disp: 5 pen, Rfl: 3  •  insulin lispro (HUMALOG) 100 UNIT/ML injection, Take up to total daily dose 50U daily, Disp: 3 mL, Rfl: 12  •  levothyroxine (SYNTHROID, LEVOTHROID) 300 MCG tablet, Take 1 tablet by mouth Daily., Disp: 30 tablet, Rfl: 3    Review of Systems  Review of Systems   Constitutional: Positive for chills and fatigue. Negative for fever and unexpected weight change.        Feeling poorly   HENT: Negative for ear pain, hearing loss, nosebleeds, rhinorrhea and sore throat.    Eyes: Positive for pain. Negative for discharge, redness, itching and visual disturbance.   Respiratory: Negative for cough, shortness of breath and wheezing.    Cardiovascular: Negative for chest pain, palpitations and leg swelling.   Gastrointestinal: Negative for abdominal pain, blood in stool, constipation and diarrhea.   Endocrine: Positive for heat intolerance. Negative for cold intolerance and polydipsia.   Genitourinary: Negative for dysuria, frequency, hematuria, pelvic pain, vaginal bleeding and vaginal discharge.   Musculoskeletal: Negative for arthralgias, gait problem, joint swelling and myalgias.   Skin: Negative for rash.   Allergic/Immunologic: Negative.    Neurological: Negative for dizziness, syncope, weakness, numbness and headaches.   Hematological: Negative for adenopathy. Does not bruise/bleed easily.  "  Psychiatric/Behavioral: Negative for sleep disturbance and suicidal ideas. The patient is not nervous/anxious.         Physical Exam    /82  Pulse 96  Ht 170.2 cm (67.01\")  Wt 90.6 kg (199 lb 11.2 oz)  SpO2 98%  BMI 31.27 kg/m2Body mass index is 31.27 kg/(m^2).  Physical Exam   Constitutional: She is oriented to person, place, and time. She appears well-developed. No distress.   HENT:   Head: Normocephalic.   Right Ear: External ear normal.   Left Ear: External ear normal.   Mouth/Throat: No oropharyngeal exudate.   Eyes: Conjunctivae and lids are normal. Right eye exhibits no discharge. Left eye exhibits no discharge. Right pupil is reactive. Left pupil is reactive.   Neck: No JVD present. No tracheal deviation present. No thyroid mass and no thyromegaly present.   Cardiovascular: Normal rate, regular rhythm, normal heart sounds and intact distal pulses.    No murmur heard.  Pulmonary/Chest: Effort normal and breath sounds normal. No respiratory distress. She has no wheezes.   Abdominal: Soft. Bowel sounds are normal. There is no tenderness.   Musculoskeletal: She exhibits no edema or tenderness.    Marisol had a diabetic foot exam performed (no calluses or ulcers) today.   During the foot exam she had a monofilament test performed (intact sensation to monofilament bilaterally).    Vascular Status -  Her exam exhibits right foot vasculature normal. Her exam exhibits no right foot edema. Her exam exhibits left foot vasculature normal. Her exam exhibits no left foot edema.   Skin Integrity  -  Her right foot skin is intact.     Marisol 's left foot skin is intact. .  Lymphadenopathy:     She has no cervical adenopathy.   Neurological: She is alert and oriented to person, place, and time.   Skin: Skin is warm, dry and intact. No rash noted. She is not diaphoretic. No erythema.   Psychiatric: She has a normal mood and affect. Her speech is normal and behavior is normal. Thought content normal.       Labs " and Imaging   Lab Results   Component Value Date    HGBA1C 11.8 02/20/2018    HGBA1C 12.10 (H) 06/03/2017     Office Visit on 02/20/2018   Component Date Value Ref Range Status   • Hemoglobin A1C 02/20/2018 11.8  % Final   • Glucose 02/20/2018 253* 70 - 100 mg/dL Final   • BUN 02/20/2018 8* 9 - 23 mg/dL Final   • Creatinine 02/20/2018 0.80  0.60 - 1.30 mg/dL Final   • Sodium 02/20/2018 135  132 - 146 mmol/L Final   • Potassium 02/20/2018 4.6  3.5 - 5.5 mmol/L Final   • Chloride 02/20/2018 97* 99 - 109 mmol/L Final   • CO2 02/20/2018 31.0  20.0 - 31.0 mmol/L Final   • Calcium 02/20/2018 9.5  8.7 - 10.4 mg/dL Final   • Total Protein 02/20/2018 6.6  5.7 - 8.2 g/dL Final   • Albumin 02/20/2018 4.30  3.20 - 4.80 g/dL Final   • ALT (SGPT) 02/20/2018 26  7 - 40 U/L Final   • AST (SGOT) 02/20/2018 23  0 - 33 U/L Final   • Alkaline Phosphatase 02/20/2018 98  25 - 100 U/L Final   • Total Bilirubin 02/20/2018 0.3  0.3 - 1.2 mg/dL Final   • eGFR Non  Amer 02/20/2018 82  >60 mL/min/1.73 Final   • Globulin 02/20/2018 2.3  gm/dL Final   • A/G Ratio 02/20/2018 1.9  1.5 - 2.5 g/dL Final   • BUN/Creatinine Ratio 02/20/2018 10.0  7.0 - 25.0 Final   • Anion Gap 02/20/2018 7.0  3.0 - 11.0 mmol/L Final   • Hepatitis C Ab 02/20/2018 Non-Reactive  Non-Reactive Final   • TSH 02/20/2018 4.615  0.350 - 5.350 mIU/mL Final   • Free T4 02/20/2018 1.11  0.89 - 1.76 ng/dL Final   • T3, Free 02/20/2018 2.1  2.0 - 4.4 pg/mL Final   • HIV-1/ HIV-2 02/20/2018 Non-Reactive  Non-Reactive Final   • WBC 02/20/2018 7.55  3.50 - 10.80 10*3/mm3 Final   • RBC 02/20/2018 4.43  3.89 - 5.14 10*6/mm3 Final   • Hemoglobin 02/20/2018 14.0  11.5 - 15.5 g/dL Final   • Hematocrit 02/20/2018 42.3  34.5 - 44.0 % Final   • MCV 02/20/2018 95.5  80.0 - 99.0 fL Final   • MCH 02/20/2018 31.6* 27.0 - 31.0 pg Final   • MCHC 02/20/2018 33.1  32.0 - 36.0 g/dL Final   • RDW 02/20/2018 12.9  11.3 - 14.5 % Final   • RDW-SD 02/20/2018 44.9  37.0 - 54.0 fl Final   • MPV  02/20/2018 10.6  6.0 - 12.0 fL Final   • Platelets 02/20/2018 409  150 - 450 10*3/mm3 Final   • Neutrophil % 02/20/2018 68.0  41.0 - 71.0 % Final   • Lymphocyte % 02/20/2018 23.3* 24.0 - 44.0 % Final   • Monocyte % 02/20/2018 5.3  0.0 - 12.0 % Final   • Eosinophil % 02/20/2018 2.3  0.0 - 3.0 % Final   • Basophil % 02/20/2018 0.8  0.0 - 1.0 % Final   • Immature Grans % 02/20/2018 0.3  0.0 - 0.6 % Final   • Neutrophils, Absolute 02/20/2018 5.14  1.50 - 8.30 10*3/mm3 Final   • Lymphocytes, Absolute 02/20/2018 1.76  0.60 - 4.80 10*3/mm3 Final   • Monocytes, Absolute 02/20/2018 0.40  0.00 - 1.00 10*3/mm3 Final   • Eosinophils, Absolute 02/20/2018 0.17  0.00 - 0.30 10*3/mm3 Final   • Basophils, Absolute 02/20/2018 0.06  0.00 - 0.20 10*3/mm3 Final   • Immature Grans, Absolute 02/20/2018 0.02  0.00 - 0.03 10*3/mm3 Final     No images are attached to the encounter or orders placed in the encounter.  No Images in the past 120 days found..    Assessment / Plan   Marisol was seen today for diabetes and hypothyroidism.    Diagnoses and all orders for this visit:    Type 1 diabetes mellitus with hyperglycemia  -     Microalbumin / Creatinine Urine Ratio - Urine, Clean Catch  -     levothyroxine (SYNTHROID, LEVOTHROID) 300 MCG tablet; Take 1 tablet by mouth Daily.  -     insulin lispro (HUMALOG) 100 UNIT/ML injection; Take up to total daily dose 50U daily  -     insulin detemir (LEVEMIR FLEXTOUCH) 100 UNIT/ML injection; Inject 30 Units under the skin Daily.  -     C-Peptide    Postablative hypothyroidism        Diabetes Mellitus 1 is under poor control.  -A1c 11.8, average sugar 300  -dc humalog 72/25- premixed insulin not best option with her lack of routine meals  -levemir- start with 25U QHS and increase 2U q2days until FBG <150  -humalog- 1:10 CHO ratio, correction 1:25 over 150- this is what she was on before  -refer for medtronic pump  -f/u 6 weeks    1.  Diet: 3-4 carb servings per meal for females, 4-5 carb servings per  meal for males  Spread carb intake throughout the day  Increase lean protein and vegetable intake  Avoid sugary drinks and processed carbs including crackers, cookies, cakes  2.  Exercise: Recommend at least 30 minutes of exercise daily, at least 5 days per week. Increase exercise gradually.   3.  Blood Glucose Goal: Blood glucose goal <150 fasting, <180 2 hr postprandial  4.  Microalbumin due   5.  Education performed during this visit: long term diabetic complications discussed. , annual eye examinations at Ophthalmology discussed, dental hygiene discussed  and foot care reviewed., home glucose monitoring emphasized, all medications, side effects and compliance discussed carefully and Hypoglycemia management and prevention reviewed. Reviewed ‘ABCs’ of diabetes management (respective goals in parentheses):  A1C (<7), blood pressure (<130/80), and cholesterol (LDL <100, if CVD <70).    Hypothyroidism  -Hx graves dz s/p ZHU at age 10  -TSH 4.6  -increase levothyroxine to 300mcg daily  -Discussed proper way to take levothyroxine.  -recheck labs in 3 months or sooner if symptomatic    There are no Patient Instructions on file for this visit.    Follow up: Return in about 6 weeks (around 4/16/2018).    Discussed the nature of the disease including, risks, complications, implications, management, safe and proper use of medications. Encouraged therapeutic lifestyle changes including low calorie diet with plenty of fruits and vegetables, daily exercise, medication compliance, and keeping scheduled follow up appointments with me and any other providers. Encouraged patient to have appointment for complete physical, fasting labs, appropriate screenings, and immunizations on an annual basis.    35 min  of 45 min face-to-face visit time spent for coordination of care and counselling regarding identified problems as outlined in the objective, assessment and discussion portions of the documentation.    Abraham Brown,  MADHURI

## 2018-03-06 PROCEDURE — 82570 ASSAY OF URINE CREATININE: CPT | Performed by: PHYSICIAN ASSISTANT

## 2018-03-06 PROCEDURE — 82043 UR ALBUMIN QUANTITATIVE: CPT | Performed by: PHYSICIAN ASSISTANT

## 2018-03-07 LAB
C PEPTIDE SERPL-MCNC: <0.1 NG/ML (ref 1.1–4.4)
CREAT 24H UR-MCNC: 32.7 MG/DL
MICROALBUMIN UR-MCNC: 17.2 UG/ML
MICROALBUMIN/CREAT UR: 52.6 MG/G CREAT (ref 0–30)

## 2018-03-12 ENCOUNTER — OFFICE VISIT (OUTPATIENT)
Dept: INTERNAL MEDICINE | Facility: CLINIC | Age: 33
End: 2018-03-12

## 2018-03-12 VITALS
SYSTOLIC BLOOD PRESSURE: 130 MMHG | BODY MASS INDEX: 31.39 KG/M2 | WEIGHT: 200 LBS | RESPIRATION RATE: 16 BRPM | OXYGEN SATURATION: 100 % | HEART RATE: 92 BPM | DIASTOLIC BLOOD PRESSURE: 82 MMHG | HEIGHT: 67 IN | TEMPERATURE: 97.8 F

## 2018-03-12 DIAGNOSIS — H65.111 ACUTE MUCOID OTITIS MEDIA OF RIGHT EAR: ICD-10-CM

## 2018-03-12 DIAGNOSIS — H69.83 DYSFUNCTION OF BOTH EUSTACHIAN TUBES: ICD-10-CM

## 2018-03-12 DIAGNOSIS — J01.00 ACUTE MAXILLARY SINUSITIS, RECURRENCE NOT SPECIFIED: Primary | ICD-10-CM

## 2018-03-12 PROCEDURE — 99213 OFFICE O/P EST LOW 20 MIN: CPT | Performed by: NURSE PRACTITIONER

## 2018-03-12 RX ORDER — FLUTICASONE PROPIONATE 50 MCG
2 SPRAY, SUSPENSION (ML) NASAL DAILY
Qty: 1 BOTTLE | Refills: 3 | Status: ON HOLD | OUTPATIENT
Start: 2018-03-12 | End: 2019-04-04

## 2018-03-12 RX ORDER — AMOXICILLIN AND CLAVULANATE POTASSIUM 875; 125 MG/1; MG/1
1 TABLET, FILM COATED ORAL 2 TIMES DAILY
Qty: 20 TABLET | Refills: 0 | Status: SHIPPED | OUTPATIENT
Start: 2018-03-12 | End: 2018-04-01

## 2018-03-12 NOTE — PROGRESS NOTES
Subjective   Marisol Cuellar is a 32 y.o. female    Chief Complaint   Patient presents with   • Nasal Congestion and drainage     sx's going on for about 6 days.    • Sore Throat   • Earache   • Headache     URI    This is a new problem. Episode onset: 1 week. The problem has been gradually worsening. There has been no fever (but has felt chilled). Associated symptoms include congestion, ear pain (left>right), headaches, a plugged ear sensation, sinus pain and a sore throat. Pertinent negatives include no chest pain, coughing, diarrhea, dysuria, joint pain, joint swelling, nausea, neck pain, rash, rhinorrhea, sneezing, swollen glands, vomiting or wheezing. Treatments tried: ibuprofen. The treatment provided mild relief.        The following portions of the patient's history were reviewed and updated as appropriate: allergies, current medications, past family history, past medical history, past social history, past surgical history and problem list.    Current Outpatient Prescriptions:   •  amoxicillin-clavulanate (AUGMENTIN) 875-125 MG per tablet, Take 1 tablet by mouth 2 (Two) Times a Day., Disp: 20 tablet, Rfl: 0  •  buPROPion SR (WELLBUTRIN SR) 200 MG 12 hr tablet, 1 po qd, Disp: , Rfl:   •  cetirizine (zyrTEC) 10 MG tablet, Take 10 mg by mouth Every Night., Disp: , Rfl:   •  clonazePAM (KlonoPIN) 1 MG tablet, Take 1 mg by mouth 2 (Two) Times a Day As Needed for Seizures., Disp: , Rfl:   •  diclofenac (VOLTAREN) 75 MG EC tablet, TK ONE T PO QD, Disp: , Rfl: 0  •  fluticasone (FLONASE) 50 MCG/ACT nasal spray, 2 sprays into each nostril Daily., Disp: 1 bottle, Rfl: 3  •  insulin detemir (LEVEMIR FLEXTOUCH) 100 UNIT/ML injection, Inject 30 Units under the skin Daily., Disp: 5 pen, Rfl: 3  •  insulin lispro (HUMALOG) 100 UNIT/ML injection, Take up to total daily dose 50U daily, Disp: 3 mL, Rfl: 12  •  lamoTRIgine (LAMICTAL) 150 MG tablet, Take 150 mg by mouth Daily., Disp: , Rfl:   •  levothyroxine (SYNTHROID,  LEVOTHROID) 300 MCG tablet, Take 1 tablet by mouth Daily., Disp: 30 tablet, Rfl: 3  •  venlafaxine (EFFEXOR) 75 MG tablet, Take 150 mg by mouth 2 (Two) Times a Day., Disp: , Rfl:      Review of Systems   Constitutional: Negative for chills and fatigue.   HENT: Positive for congestion, ear pain (left>right), sinus pain and sore throat. Negative for rhinorrhea and sneezing.    Respiratory: Negative for cough, chest tightness and wheezing.    Cardiovascular: Negative for chest pain.   Gastrointestinal: Negative for diarrhea, nausea and vomiting.   Endocrine: Negative for cold intolerance and heat intolerance.   Genitourinary: Negative for dysuria.   Musculoskeletal: Negative for arthralgias, joint pain and neck pain.   Skin: Negative for rash.   Neurological: Positive for headaches.       Objective   Physical Exam   Constitutional: She is oriented to person, place, and time. She appears well-developed and well-nourished.   HENT:   Head: Normocephalic and atraumatic.   Right Ear: Tympanic membrane is injected and erythematous. A middle ear effusion is present.   Left Ear: Tympanic membrane is not injected and not erythematous. A middle ear effusion is present.   Nose: Right sinus exhibits maxillary sinus tenderness and frontal sinus tenderness. Left sinus exhibits maxillary sinus tenderness and frontal sinus tenderness.   Mouth/Throat: Posterior oropharyngeal erythema (thick, yellow PND) present.   Eyes: Conjunctivae and EOM are normal. Pupils are equal, round, and reactive to light.   Neck: Normal range of motion.   Cardiovascular: Normal rate, regular rhythm and normal heart sounds.    Pulmonary/Chest: Effort normal and breath sounds normal.   Abdominal: Soft. Bowel sounds are normal.   Musculoskeletal: Normal range of motion.   Neurological: She is alert and oriented to person, place, and time. She has normal reflexes.   Skin: Skin is warm and dry.   Psychiatric: She has a normal mood and affect. Her behavior is  "normal. Judgment and thought content normal.     Vitals:    03/12/18 1327   BP: 130/82   Pulse: 92   Resp: 16   Temp: 97.8 °F (36.6 °C)   TempSrc: Temporal Artery    SpO2: 100%   Weight: 90.7 kg (200 lb)   Height: 170.2 cm (67.01\")         Assessment/Plan   Marisol was seen today for nasal congestion and drainage, sore throat, earache and headache.    Diagnoses and all orders for this visit:    Acute maxillary sinusitis, recurrence not specified  -     amoxicillin-clavulanate (AUGMENTIN) 875-125 MG per tablet; Take 1 tablet by mouth 2 (Two) Times a Day.    Acute mucoid otitis media of right ear  -     amoxicillin-clavulanate (AUGMENTIN) 875-125 MG per tablet; Take 1 tablet by mouth 2 (Two) Times a Day.    Dysfunction of both eustachian tubes  -     fluticasone (FLONASE) 50 MCG/ACT nasal spray; 2 sprays into each nostril Daily.      Meds as directed  Increase fluids  RTC if sx's worsen or do not improve           "

## 2018-03-23 ENCOUNTER — TELEPHONE (OUTPATIENT)
Dept: INTERNAL MEDICINE | Facility: CLINIC | Age: 33
End: 2018-03-23

## 2018-03-26 NOTE — TELEPHONE ENCOUNTER
Contacted Pharmacy and patient needed Pen [place]. Pharmacist stated he would fill what patients Insurance would cover.

## 2018-04-09 ENCOUNTER — TELEPHONE (OUTPATIENT)
Dept: ENDOCRINOLOGY | Facility: CLINIC | Age: 33
End: 2018-04-09

## 2018-04-09 NOTE — TELEPHONE ENCOUNTER
----- Message from Abraham Brown PA-C sent at 4/9/2018  4:25 PM EDT -----  She recently got a Dexcom sensor, but needs to return it so we can get a Metronic sensor to go with a Medtronic 670 pump.  Please let pt know.

## 2018-04-10 ENCOUNTER — OFFICE VISIT (OUTPATIENT)
Dept: INTERNAL MEDICINE | Facility: CLINIC | Age: 33
End: 2018-04-10

## 2018-04-10 ENCOUNTER — TELEPHONE (OUTPATIENT)
Dept: ENDOCRINOLOGY | Facility: CLINIC | Age: 33
End: 2018-04-10

## 2018-04-10 VITALS
DIASTOLIC BLOOD PRESSURE: 80 MMHG | RESPIRATION RATE: 16 BRPM | OXYGEN SATURATION: 99 % | TEMPERATURE: 98.4 F | SYSTOLIC BLOOD PRESSURE: 138 MMHG | HEIGHT: 67 IN | HEART RATE: 96 BPM | WEIGHT: 206 LBS | BODY MASS INDEX: 32.33 KG/M2

## 2018-04-10 DIAGNOSIS — M25.50 ARTHRALGIA, UNSPECIFIED JOINT: ICD-10-CM

## 2018-04-10 DIAGNOSIS — E89.0 POSTABLATIVE HYPOTHYROIDISM: ICD-10-CM

## 2018-04-10 DIAGNOSIS — Z01.419 ENCOUNTER FOR GYNECOLOGICAL EXAMINATION WITHOUT ABNORMAL FINDING: ICD-10-CM

## 2018-04-10 DIAGNOSIS — R53.83 OTHER FATIGUE: ICD-10-CM

## 2018-04-10 DIAGNOSIS — Z00.00 ANNUAL PHYSICAL EXAM: Primary | ICD-10-CM

## 2018-04-10 DIAGNOSIS — R53.81 MALAISE: ICD-10-CM

## 2018-04-10 LAB
BILIRUB BLD-MCNC: NEGATIVE MG/DL
CLARITY, POC: CLEAR
COLOR UR: YELLOW
GLUCOSE UR STRIP-MCNC: ABNORMAL MG/DL
KETONES UR QL: NEGATIVE
LEUKOCYTE EST, POC: NEGATIVE
NITRITE UR-MCNC: NEGATIVE MG/ML
PH UR: 8 [PH] (ref 5–8)
PROT UR STRIP-MCNC: NEGATIVE MG/DL
RBC # UR STRIP: NEGATIVE /UL
SP GR UR: 1.01 (ref 1–1.03)
UROBILINOGEN UR QL: NORMAL

## 2018-04-10 PROCEDURE — 99395 PREV VISIT EST AGE 18-39: CPT | Performed by: NURSE PRACTITIONER

## 2018-04-10 PROCEDURE — 81003 URINALYSIS AUTO W/O SCOPE: CPT | Performed by: NURSE PRACTITIONER

## 2018-04-10 NOTE — PROGRESS NOTES
Subjective   Marisol Cuellar is a 32 y.o. female    Chief Complaint   Patient presents with   • Annual Exam   • Gynecologic Exam     History of Present Illness     Here for PE    States that at least once a week, she feels like she has flu-like sx's.  She is very achy, feverish and chilled.  Has also had random rashes that come and go intermittently.  She is concerned for an autoimmune disorder.      DM I - Referred to Endo at last visit.  Dx'd at age 9.  Currently on Tresiba and SS Insulin.  She has a CGM and is awaiting approval of an insulin pump.       IV drug user - she is currently 90+ days sober.  She has quit through NA and goes to a meeting every day.       Graves disease - currently taking Levothyroxine 300mcg daily - now managed by [facility]     Anxiety - followed by [facility], Flaquito Kamara. Currently taking Wellbutrin SR 200mg daily, Lamictal 150mg daily, and Effexor 150mg daily.  She has a script for Klonopin, but does not take on reg basis.      Seeing [facility] for back pain - ; has surgery scheduled on May 8th.  Currently taking Gabapentin 300 QHS for pain     Tdap - 1/2015  Flu shot - declines  Pap - updating today    Diet - trying to eat /healthier meals    Exercise - minimal    Social History   Substance Use Topics   • Smoking status: Former Smoker   • Smokeless tobacco: Never Used      Comment: QUIT 11/2017   • Alcohol use No         The following portions of the patient's history were reviewed and updated as appropriate: allergies, current medications, past family history, past medical history, past social history, past surgical history and problem list.    Current Outpatient Prescriptions:   •  B-D ULTRAFINE III SHORT PEN 31G X 8 MM misc, USE UTD WITH TRESIBA, Disp: , Rfl: 3  •  buPROPion SR (WELLBUTRIN SR) 200 MG 12 hr tablet, 1 po qd, Disp: , Rfl:   •  cetirizine (zyrTEC) 10 MG tablet, Take 10 mg by mouth Every Night., Disp: , Rfl:   •  clonazePAM (KlonoPIN) 1 MG tablet, Take 1 mg by mouth 2 (Two)  Times a Day As Needed for Seizures., Disp: , Rfl:   •  diclofenac (VOLTAREN) 75 MG EC tablet, TK ONE T PO QD, Disp: , Rfl: 0  •  fluticasone (FLONASE) 50 MCG/ACT nasal spray, 2 sprays into each nostril Daily., Disp: 1 bottle, Rfl: 3  •  gabapentin (NEURONTIN) 300 MG capsule, TK ONE C PO HS, Disp: , Rfl: 0  •  insulin degludec (TRESIBA FLEXTOUCH) 100 UNIT/ML solution pen-injector injection, Inject 30 Units under the skin Daily., Disp: 5 pen, Rfl: 3  •  insulin lispro (HUMALOG) 100 UNIT/ML injection, Take up to total daily dose 50U daily, Disp: 3 mL, Rfl: 12  •  lamoTRIgine (LAMICTAL) 150 MG tablet, Take 150 mg by mouth Daily., Disp: , Rfl:   •  levothyroxine (SYNTHROID, LEVOTHROID) 300 MCG tablet, Take 1 tablet by mouth Daily., Disp: 30 tablet, Rfl: 3  •  venlafaxine (EFFEXOR) 75 MG tablet, Take 150 mg by mouth 2 (Two) Times a Day., Disp: , Rfl:      Review of Systems   Constitutional: Positive for chills and fatigue. Negative for appetite change, fever and unexpected weight change.   HENT: Negative for congestion, ear pain, nosebleeds, rhinorrhea and tinnitus.    Eyes: Negative for pain.   Respiratory: Negative for cough, chest tightness and shortness of breath.    Cardiovascular: Negative for chest pain, palpitations and leg swelling.   Gastrointestinal: Negative for abdominal distention, abdominal pain, blood in stool, constipation, diarrhea, nausea and vomiting.   Endocrine: Negative for cold intolerance, heat intolerance, polydipsia, polyphagia and polyuria.   Genitourinary: Negative for dysuria, flank pain, frequency, hematuria and urgency.   Musculoskeletal: Positive for arthralgias and myalgias. Negative for back pain, gait problem, joint swelling and neck pain.   Skin: Negative for color change, pallor, rash and wound.   Allergic/Immunologic: Negative for environmental allergies and food allergies.   Neurological: Negative for dizziness, syncope, weakness, light-headedness, numbness and headaches.    Hematological: Negative for adenopathy. Does not bruise/bleed easily.   Psychiatric/Behavioral: Negative for behavioral problems and suicidal ideas. The patient is not nervous/anxious.        Objective   Physical Exam   Constitutional: She is oriented to person, place, and time. She appears well-developed and well-nourished.   HENT:   Head: Normocephalic and atraumatic.   Right Ear: External ear normal.   Left Ear: External ear normal.   Nose: Nose normal.   Mouth/Throat: Oropharynx is clear and moist.   Eyes: Conjunctivae and EOM are normal. Pupils are equal, round, and reactive to light.   Neck: Normal range of motion. Neck supple.   Cardiovascular: Normal rate, regular rhythm, normal heart sounds and intact distal pulses.    Pulses:       Carotid pulses are 2+ on the right side, and 2+ on the left side.  Pulmonary/Chest: Effort normal and breath sounds normal. Right breast exhibits no inverted nipple, no mass, no nipple discharge, no skin change and no tenderness. Left breast exhibits no inverted nipple, no mass, no nipple discharge, no skin change and no tenderness. Breasts are symmetrical. There is no breast swelling.   Abdominal: Soft. Normal appearance, normal aorta and bowel sounds are normal.   Genitourinary: Vagina normal and uterus normal. No breast tenderness, discharge or bleeding.   Musculoskeletal: Normal range of motion.   Lymphadenopathy:        Head (right side): No tonsillar adenopathy present.        Head (left side): No tonsillar adenopathy present.        Right cervical: No superficial cervical and no posterior cervical adenopathy present.       Left cervical: No superficial cervical and no posterior cervical adenopathy present.   Neurological: She is alert and oriented to person, place, and time. She has normal strength and normal reflexes. She displays a negative Romberg sign.   Skin: Skin is warm, dry and intact.   Psychiatric: She has a normal mood and affect. Her behavior is normal.  "Judgment and thought content normal.   Vitals reviewed.    Vitals:    04/10/18 1411   BP: 138/80   Pulse: 96   Resp: 16   Temp: 98.4 °F (36.9 °C)   TempSrc: Temporal Artery    SpO2: 99%   Weight: 93.4 kg (206 lb)   Height: 170.2 cm (67.01\")         Assessment/Plan   Marisol was seen today for annual exam and gynecologic exam.    Diagnoses and all orders for this visit:    Annual physical exam  -     POC Urinalysis Dipstick, Automated  -     CBC & Differential; Future  -     Comprehensive Metabolic Panel; Future  -     Lipid Panel; Future  -     Vitamin D 25 Hydroxy; Future  -     TSH; Future    Postablative hypothyroidism  -     CBC & Differential; Future  -     Comprehensive Metabolic Panel; Future  -     Lipid Panel; Future  -     Vitamin D 25 Hydroxy; Future  -     TSH; Future    Other fatigue  -     CBC & Differential; Future  -     Comprehensive Metabolic Panel; Future  -     Lipid Panel; Future  -     Vitamin D 25 Hydroxy; Future  -     Vitamin B12; Future  -     Sedimentation Rate; Future  -     C-reactive Protein; Future  -     Uric Acid; Future  -     Faby-Barr Virus VCA Antibody Panel; Future  -     MIKE; Future    Malaise  -     CBC & Differential; Future  -     Comprehensive Metabolic Panel; Future  -     Lipid Panel; Future  -     Vitamin D 25 Hydroxy; Future  -     Vitamin B12; Future  -     Sedimentation Rate; Future  -     C-reactive Protein; Future  -     Uric Acid; Future  -     Faby-Barr Virus VCA Antibody Panel; Future  -     MIKE; Future    Arthralgia, unspecified joint  -     CBC & Differential; Future  -     Comprehensive Metabolic Panel; Future  -     Lipid Panel; Future  -     Vitamin D 25 Hydroxy; Future  -     Vitamin B12; Future  -     Sedimentation Rate; Future  -     C-reactive Protein; Future  -     Uric Acid; Future  -     Faby-Barr Virus VCA Antibody Panel; Future  -     MIKE; Future    Encounter for gynecological examination without abnormal finding  -     Liquid-based Pap " Smear, Screening; Future      Pap and breast exam done  Pt is not fasting, so she will RTC for labs  Will ck additional autoimmune labs due to c/o joint pain, etc  Declines Pneumovax  No med changes  Continue f/u with endo  Counseling - diet and exercise  F/U in 3 months or sooner with any problems

## 2018-04-10 NOTE — TELEPHONE ENCOUNTER
SPOKE WITH PATIENT TODAY SHE STATED SHE HAD RECEIVED MY MESSAGE AND SHE WOULD CONTACT [facility] AND GET THE SENSOR BACK TO THEM.

## 2018-04-11 ENCOUNTER — LAB (OUTPATIENT)
Dept: INTERNAL MEDICINE | Facility: CLINIC | Age: 33
End: 2018-04-11

## 2018-04-11 DIAGNOSIS — R53.81 MALAISE: ICD-10-CM

## 2018-04-11 DIAGNOSIS — E89.0 POSTABLATIVE HYPOTHYROIDISM: ICD-10-CM

## 2018-04-11 DIAGNOSIS — Z00.00 ANNUAL PHYSICAL EXAM: ICD-10-CM

## 2018-04-11 DIAGNOSIS — M25.50 ARTHRALGIA, UNSPECIFIED JOINT: ICD-10-CM

## 2018-04-11 DIAGNOSIS — R53.83 OTHER FATIGUE: ICD-10-CM

## 2018-04-11 LAB
25(OH)D3 SERPL-MCNC: 22.9 NG/ML
ALBUMIN SERPL-MCNC: 4.5 G/DL (ref 3.2–4.8)
ALBUMIN/GLOB SERPL: 1.9 G/DL (ref 1.5–2.5)
ALP SERPL-CCNC: 96 U/L (ref 25–100)
ALT SERPL W P-5'-P-CCNC: 18 U/L (ref 7–40)
ANION GAP SERPL CALCULATED.3IONS-SCNC: 5 MMOL/L (ref 3–11)
ARTICHOKE IGE QN: 145 MG/DL (ref 0–130)
AST SERPL-CCNC: 20 U/L (ref 0–33)
BASOPHILS # BLD AUTO: 0.09 10*3/MM3 (ref 0–0.2)
BASOPHILS NFR BLD AUTO: 1.1 % (ref 0–1)
BILIRUB SERPL-MCNC: 0.6 MG/DL (ref 0.3–1.2)
BUN BLD-MCNC: 10 MG/DL (ref 9–23)
BUN/CREAT SERPL: 14.3 (ref 7–25)
CALCIUM SPEC-SCNC: 9.4 MG/DL (ref 8.7–10.4)
CHLORIDE SERPL-SCNC: 99 MMOL/L (ref 99–109)
CHOLEST SERPL-MCNC: 207 MG/DL (ref 0–200)
CO2 SERPL-SCNC: 30 MMOL/L (ref 20–31)
CREAT BLD-MCNC: 0.7 MG/DL (ref 0.6–1.3)
CRP SERPL-MCNC: 0.28 MG/DL (ref 0–1)
DEPRECATED RDW RBC AUTO: 45.8 FL (ref 37–54)
EOSINOPHIL # BLD AUTO: 0.18 10*3/MM3 (ref 0–0.3)
EOSINOPHIL NFR BLD AUTO: 2.2 % (ref 0–3)
ERYTHROCYTE [DISTWIDTH] IN BLOOD BY AUTOMATED COUNT: 12.9 % (ref 11.3–14.5)
ERYTHROCYTE [SEDIMENTATION RATE] IN BLOOD: 30 MM/HR (ref 0–20)
GFR SERPL CREATININE-BSD FRML MDRD: 96 ML/MIN/1.73
GLOBULIN UR ELPH-MCNC: 2.4 GM/DL
GLUCOSE BLD-MCNC: 143 MG/DL (ref 70–100)
HCT VFR BLD AUTO: 43 % (ref 34.5–44)
HDLC SERPL-MCNC: 56 MG/DL (ref 40–60)
HGB BLD-MCNC: 14.8 G/DL (ref 11.5–15.5)
IMM GRANULOCYTES # BLD: 0.02 10*3/MM3 (ref 0–0.03)
IMM GRANULOCYTES NFR BLD: 0.2 % (ref 0–0.6)
LYMPHOCYTES # BLD AUTO: 2 10*3/MM3 (ref 0.6–4.8)
LYMPHOCYTES NFR BLD AUTO: 24.5 % (ref 24–44)
MCH RBC QN AUTO: 33.3 PG (ref 27–31)
MCHC RBC AUTO-ENTMCNC: 34.4 G/DL (ref 32–36)
MCV RBC AUTO: 96.8 FL (ref 80–99)
MONOCYTES # BLD AUTO: 0.57 10*3/MM3 (ref 0–1)
MONOCYTES NFR BLD AUTO: 7 % (ref 0–12)
NEUTROPHILS # BLD AUTO: 5.29 10*3/MM3 (ref 1.5–8.3)
NEUTROPHILS NFR BLD AUTO: 65 % (ref 41–71)
PLATELET # BLD AUTO: 391 10*3/MM3 (ref 150–450)
PMV BLD AUTO: 10.2 FL (ref 6–12)
POTASSIUM BLD-SCNC: 4.2 MMOL/L (ref 3.5–5.5)
PROT SERPL-MCNC: 6.9 G/DL (ref 5.7–8.2)
RBC # BLD AUTO: 4.44 10*6/MM3 (ref 3.89–5.14)
SODIUM BLD-SCNC: 134 MMOL/L (ref 132–146)
TRIGL SERPL-MCNC: 126 MG/DL (ref 0–150)
TSH SERPL DL<=0.05 MIU/L-ACNC: 1.25 MIU/ML (ref 0.35–5.35)
URATE SERPL-MCNC: 2.4 MG/DL (ref 3.1–7.8)
VIT B12 BLD-MCNC: 727 PG/ML (ref 211–911)
WBC NRBC COR # BLD: 8.15 10*3/MM3 (ref 3.5–10.8)

## 2018-04-11 PROCEDURE — 86663 EPSTEIN-BARR ANTIBODY: CPT | Performed by: NURSE PRACTITIONER

## 2018-04-11 PROCEDURE — 86038 ANTINUCLEAR ANTIBODIES: CPT | Performed by: NURSE PRACTITIONER

## 2018-04-11 PROCEDURE — 84550 ASSAY OF BLOOD/URIC ACID: CPT | Performed by: NURSE PRACTITIONER

## 2018-04-11 PROCEDURE — 80053 COMPREHEN METABOLIC PANEL: CPT | Performed by: NURSE PRACTITIONER

## 2018-04-11 PROCEDURE — 80061 LIPID PANEL: CPT | Performed by: NURSE PRACTITIONER

## 2018-04-11 PROCEDURE — 82607 VITAMIN B-12: CPT | Performed by: NURSE PRACTITIONER

## 2018-04-11 PROCEDURE — 86664 EPSTEIN-BARR NUCLEAR ANTIGEN: CPT | Performed by: NURSE PRACTITIONER

## 2018-04-11 PROCEDURE — 82306 VITAMIN D 25 HYDROXY: CPT | Performed by: NURSE PRACTITIONER

## 2018-04-11 PROCEDURE — 86140 C-REACTIVE PROTEIN: CPT | Performed by: NURSE PRACTITIONER

## 2018-04-11 PROCEDURE — 85025 COMPLETE CBC W/AUTO DIFF WBC: CPT | Performed by: NURSE PRACTITIONER

## 2018-04-11 PROCEDURE — 84443 ASSAY THYROID STIM HORMONE: CPT | Performed by: NURSE PRACTITIONER

## 2018-04-11 PROCEDURE — 86665 EPSTEIN-BARR CAPSID VCA: CPT | Performed by: NURSE PRACTITIONER

## 2018-04-13 LAB
ANA SER QL: NEGATIVE
EBV EA IGG SER-ACNC: <9 U/ML (ref 0–8.9)
EBV NA IGG SER IA-ACNC: 335 U/ML (ref 0–17.9)
EBV VCA IGG SER-ACNC: 122 U/ML (ref 0–17.9)
EBV VCA IGM SER-ACNC: <36 U/ML (ref 0–35.9)
INTERPRETATION: ABNORMAL

## 2018-04-18 RX ORDER — FLUCONAZOLE 150 MG/1
150 TABLET ORAL ONCE
Qty: 1 TABLET | Refills: 0 | Status: SHIPPED | OUTPATIENT
Start: 2018-04-18 | End: 2018-04-18

## 2018-04-20 ENCOUNTER — OFFICE VISIT (OUTPATIENT)
Dept: INTERNAL MEDICINE | Facility: CLINIC | Age: 33
End: 2018-04-20

## 2018-04-20 ENCOUNTER — TELEPHONE (OUTPATIENT)
Dept: INTERNAL MEDICINE | Facility: CLINIC | Age: 33
End: 2018-04-20

## 2018-04-20 VITALS
OXYGEN SATURATION: 99 % | DIASTOLIC BLOOD PRESSURE: 88 MMHG | HEIGHT: 67 IN | HEART RATE: 85 BPM | WEIGHT: 207 LBS | SYSTOLIC BLOOD PRESSURE: 144 MMHG | RESPIRATION RATE: 16 BRPM | TEMPERATURE: 96.7 F | BODY MASS INDEX: 32.49 KG/M2

## 2018-04-20 DIAGNOSIS — S60.522A BLISTER OF LEFT HAND, INITIAL ENCOUNTER: ICD-10-CM

## 2018-04-20 DIAGNOSIS — B02.9 HERPES ZOSTER WITHOUT COMPLICATION: ICD-10-CM

## 2018-04-20 DIAGNOSIS — J01.00 ACUTE MAXILLARY SINUSITIS, RECURRENCE NOT SPECIFIED: Primary | ICD-10-CM

## 2018-04-20 PROCEDURE — 99214 OFFICE O/P EST MOD 30 MIN: CPT | Performed by: NURSE PRACTITIONER

## 2018-04-20 RX ORDER — VALACYCLOVIR HYDROCHLORIDE 1 G/1
1000 TABLET, FILM COATED ORAL 3 TIMES DAILY
Qty: 21 TABLET | Refills: 0 | Status: SHIPPED | OUTPATIENT
Start: 2018-04-20 | End: 2018-05-03

## 2018-04-20 RX ORDER — AMOXICILLIN AND CLAVULANATE POTASSIUM 875; 125 MG/1; MG/1
1 TABLET, FILM COATED ORAL 2 TIMES DAILY
Qty: 20 TABLET | Refills: 0 | Status: SHIPPED | OUTPATIENT
Start: 2018-04-20 | End: 2018-05-03

## 2018-04-20 NOTE — TELEPHONE ENCOUNTER
Please let pt know that her Vitamin D is low.  I recommend OTC, D3, 5000 units daily.  All other labs were within acceptable limits.  No sign of an autoimmune cause of joint pain/fatigue.

## 2018-04-20 NOTE — TELEPHONE ENCOUNTER
----- Message from JOE Roach sent at 4/18/2018  7:59 PM EDT -----  Pap was + for yeast.  I will send in diflucan.  Otherwise, it was WNL

## 2018-04-20 NOTE — PROGRESS NOTES
Subjective   Marisol Cuellar is a 32 y.o. female.     Sinusitis   This is a new problem. The current episode started 1 to 4 weeks ago. The problem has been gradually worsening since onset. The maximum temperature recorded prior to her arrival was 100.4 - 100.9 F. The fever has been present for 1 to 2 days (in the evening). The pain is mild. Associated symptoms include chills, congestion, coughing, ear pain, headaches, sinus pressure and a sore throat. Pertinent negatives include no diaphoresis, hoarse voice, neck pain, shortness of breath, sneezing or swollen glands. Past treatments include acetaminophen. The treatment provided moderate relief.      Right shoulder pain-Has been having burning/tingling pain to the skin on her right shoulder blade tjhat started suddenly 2 days ago and has persisted.  Pain does not radiate.  Has had shingles in the past and this feels very similar to her. Is progressively worsening.  Worse when touched or with warm shower. Nothing helps the pain. Denies any ROM limitations.     Left hand blister- Noticed a pinpoint blister to her left outer hand this morning. Throughout the day area has enlarged to the size of a jelly bean.  No previous injury. Denies erythema, warmth, or drainage.  Nothing makes better or worse.     The following portions of the patient's history were reviewed and updated as appropriate: allergies, current medications, past family history, past medical history, past social history, past surgical history and problem list.    Review of Systems   Constitutional: Positive for chills, fatigue and fever. Negative for diaphoresis.   HENT: Positive for congestion, ear pain, postnasal drip, sinus pain, sinus pressure and sore throat. Negative for drooling, ear discharge, hoarse voice, mouth sores, nosebleeds, rhinorrhea, sneezing and trouble swallowing.    Eyes: Negative for photophobia, pain, redness, itching and visual disturbance.   Respiratory: Positive for cough.  Negative for chest tightness, shortness of breath and wheezing.    Cardiovascular: Negative for chest pain.   Gastrointestinal: Negative for abdominal pain, diarrhea and nausea.   Musculoskeletal: Positive for myalgias. Negative for arthralgias and neck pain.   Skin: Positive for color change and rash (blister). Negative for pallor.   Neurological: Positive for headaches. Negative for dizziness and weakness.       Objective   Physical Exam   Constitutional: She is oriented to person, place, and time. She appears well-developed and well-nourished. No distress.   HENT:   Head: Normocephalic and atraumatic.   Right Ear: Tympanic membrane and external ear normal.   Left Ear: Tympanic membrane and external ear normal.   Nose: Mucosal edema and rhinorrhea present. Right sinus exhibits maxillary sinus tenderness. Right sinus exhibits no frontal sinus tenderness. Left sinus exhibits maxillary sinus tenderness. Left sinus exhibits no frontal sinus tenderness.   Mouth/Throat: Uvula is midline, oropharynx is clear and moist and mucous membranes are normal. No oropharyngeal exudate.   Eyes: Conjunctivae are normal. Pupils are equal, round, and reactive to light. Right eye exhibits no discharge. Left eye exhibits no discharge.   Neck: Normal range of motion. Neck supple.   Cardiovascular: Normal rate, regular rhythm and normal heart sounds.    Pulmonary/Chest: Effort normal and breath sounds normal. No respiratory distress.   Abdominal: Soft. She exhibits no distension.   Lymphadenopathy:     She has no cervical adenopathy.   Neurological: She is alert and oriented to person, place, and time.   Skin: Skin is warm and dry. No rash noted. She is not diaphoretic.        Psychiatric: She has a normal mood and affect. Her behavior is normal.   Nursing note and vitals reviewed.      Assessment/Plan      Marisol was seen today for sinusitis, blister and shoulder pain.    Diagnoses and all orders for this visit:    Acute maxillary  sinusitis, recurrence not specified  -     amoxicillin-clavulanate (AUGMENTIN) 875-125 MG per tablet; Take 1 tablet by mouth 2 (Two) Times a Day.    Herpes zoster without complication  -     valACYclovir (VALTREX) 1000 MG tablet; Take 1 tablet by mouth 3 (Three) Times a Day.    Blister of left hand, initial encounter      Augmentin as directed  valtrex as directed  Increase fluids  Tylenol or ibuprofen as needed  Instructions in AVS  Return if symptoms worsen or fail to improve.  Plan of care discussed with pt. They verbalized understanding and agreement.

## 2018-04-20 NOTE — PATIENT INSTRUCTIONS
Blisters, Adult  A blister is a raised bubble of skin filled with liquid. Blisters often develop in an area of the skin that repeatedly rubs or presses against another surface (friction blister). Friction blisters can occur on any part of the body, but they usually develop on the hands or feet. Long-term pressure on the same area of the skin can also lead to areas of hardened skin (calluses).  What are the causes?  A blister can be caused by:  · An injury.  · A burn.  · An allergic reaction.  · An infection.  · Exposure to irritating chemicals.  · Friction, especially in an area with a lot of heat and moisture.  Friction blisters often result from:  · Sports.  · Repetitive activities.  · Using tools and doing other activities without wearing gloves.  · Shoes that are too tight or too loose.  What are the signs or symptoms?  A blister is often round and looks like a bump. It may:  · Itch.  · Be painful to the touch.  Before a blister forms, the skin may:  · Become red.  · Feel warm.  · Itch.  · Be painful to the touch.  How is this diagnosed?  A blister is diagnosed with a physical exam.  How is this treated?  Treatment usually involves protecting the area where the blister has formed until the skin has healed. Other treatments may include:  · A bandage (dressing) to cover the blister.  · Extra padding around and over the blister, so that it does not rub on anything.  · Antibiotic ointment.  Most blisters break open, dry up, and go away on their own within 1-2 weeks. Blisters that are very painful may be drained before they break open on their own. If the blister is large or painful, it can be drained by:  1. Sterilizing a small needle with rubbing alcohol.  2. Washing your hands with soap and water.  3. Inserting the needle in the edge of the blister to make a small hole. Some fluid will drain out of the hole. Let the top or roof of the blister stay in place. This helps the skin heal.  4. Washing the blister with  mild soap and water.  5. Covering the blister with antibiotic ointment, if prescribed by your health care provider, and a dressing.  Some blisters may need to be drained by a health care provider.  Follow these instructions at home:  · Protect the area where the blister has formed as told by your health care provider.  · Keep your blister clean and dry. This helps to prevent infection.  · Do not pop your blister. This can cause infection.  · If you were prescribed an antibiotic, use it as told by your health care provider. Do not stop using the antibiotic even if your condition improves.  · Wear different shoes until the blister heals.  · Avoid the activity that caused the blister until your blister heals.  · Check your blister every day for signs of infection. Check for:  ¨ More redness, swelling, or pain.  ¨ More fluid or blood.  ¨ Warmth.  ¨ Pus or a bad smell.  ¨ The blister getting better and then getting worse.  How is this prevented?  Taking these steps can help to prevent blisters that are caused by friction. Make sure you:  · Wear comfortable shoes that fit well.  · Always wear socks with shoes.  · Wear extra socks or use tape, bandages, or pads over blister-prone areas as needed. You may also apply petroleum jelly under bandages in blister-prone areas.  · Wear protective gear, such as gloves, when participating in sports or activities that can cause blisters.  · Wear loose-fitting, moisture-wicking clothes when participating in sports or activities.  · Use powders as needed to keep your feet dry.  Contact a health care provider if:  · You have more redness, swelling, or pain around your blister.  · You have more fluid or blood coming from your blister.  · Your blister feels warm to the touch.  · You have pus or a bad smell coming from your blister.  · You have a fever or chills.  · Your blister gets better and then it gets worse.  This information is not intended to replace advice given to you by your  health care provider. Make sure you discuss any questions you have with your health care provider.  Document Released: 01/25/2006 Document Revised: 08/16/2017 Document Reviewed: 06/30/2017  [employer] Interactive Patient Education © 2017 [employer]    Shingles  Shingles is an infection that causes a painful skin rash and fluid-filled blisters. Shingles is caused by the same virus that causes chickenpox.  Shingles only develops in people who:  · Have had chickenpox.  · Have gotten the chickenpox vaccine. (This is rare.)  The first symptoms of shingles may be itching, tingling, or pain in an area on your skin. A rash will follow in a few days or weeks. The rash is usually on one side of the body in a bandlike or beltlike pattern. Over time, the rash turns into fluid-filled blisters that break open, scab over, and dry up. Medicines may:  · Help you manage pain.  · Help you recover more quickly.  · Help to prevent long-term problems.  Follow these instructions at home:  Medicines   · Take medicines only as told by your doctor.  · Apply an anti-itch or numbing cream to the affected area as told by your doctor.  Blister and Rash Care   · Take a cool bath or put cool compresses on the area of the rash or blisters as told by your doctor. This may help with pain and itching.  · Keep your rash covered with a loose bandage (dressing). Wear loose-fitting clothing.  · Keep your rash and blisters clean with mild soap and cool water or as told by your doctor.  · Check your rash every day for signs of infection. These include redness, swelling, and pain that lasts or gets worse.  · Do not pick your blisters.  · Do not scratch your rash.  General instructions   · Rest as told by your doctor.  · Keep all follow-up visits as told by your doctor. This is important.  · Until your blisters scab over, your infection can cause chickenpox in people who have never had it or been vaccinated against it. To prevent this from happening, avoid  touching other people or being around other people, especially:  ¨ Babies.  ¨ Pregnant women.  ¨ Children who have eczema.  ¨ Elderly people who have transplants.  ¨ People who have chronic illnesses, such as leukemia or AIDS.  Contact a doctor if:  · Your pain does not get better with medicine.  · Your pain does not get better after the rash heals.  · Your rash looks infected. Signs of infection include:  ¨ Redness.  ¨ Swelling.  ¨ Pain that lasts or gets worse.  Get help right away if:  · The rash is on your face or nose.  · You have pain in your face, pain around your eye area, or loss of feeling on one side of your face.  · You have ear pain or you have ringing in your ear.  · You have loss of taste.  · Your condition gets worse.  This information is not intended to replace advice given to you by your health care provider. Make sure you discuss any questions you have with your health care provider.  Document Released: 06/05/2009 Document Revised: 08/13/2017 Document Reviewed: 09/29/2015  [employer] Interactive Patient Education © 2017 [employer]    Sinusitis, Adult  Sinusitis is soreness and inflammation of your sinuses. Sinuses are hollow spaces in the bones around your face. They are located:  · Around your eyes.  · In the middle of your forehead.  · Behind your nose.  · In your cheekbones.  Your sinuses and nasal passages are lined with a stringy fluid (mucus). Mucus normally drains out of your sinuses. When your nasal tissues get inflamed or swollen, the mucus can get trapped or blocked so air cannot flow through your sinuses. This lets bacteria, viruses, and funguses grow, and that leads to infection.  Follow these instructions at home:  Medicines   · Take, use, or apply over-the-counter and prescription medicines only as told by your doctor. These may include nasal sprays.  · If you were prescribed an antibiotic medicine, take it as told by your doctor. Do not stop taking the antibiotic even if you start  to feel better.  Hydrate and Humidify   · Drink enough water to keep your pee (urine) clear or pale yellow.  · Use a cool mist humidifier to keep the humidity level in your home above 50%.  · Breathe in steam for 10-15 minutes, 3-4 times a day or as told by your doctor. You can do this in the bathroom while a hot shower is running.  · Try not to spend time in cool or dry air.  Rest   · Rest as much as possible.  · Sleep with your head raised (elevated).  · Make sure to get enough sleep each night.  General instructions   · Put a warm, moist washcloth on your face 3-4 times a day or as told by your doctor. This will help with discomfort.  · Wash your hands often with soap and water. If there is no soap and water, use hand .  · Do not smoke. Avoid being around people who are smoking (secondhand smoke).  · Keep all follow-up visits as told by your doctor. This is important.  Contact a doctor if:  · You have a fever.  · Your symptoms get worse.  · Your symptoms do not get better within 10 days.  Get help right away if:  · You have a very bad headache.  · You cannot stop throwing up (vomiting).  · You have pain or swelling around your face or eyes.  · You have trouble seeing.  · You feel confused.  · Your neck is stiff.  · You have trouble breathing.  This information is not intended to replace advice given to you by your health care provider. Make sure you discuss any questions you have with your health care provider.  Document Released: 06/05/2009 Document Revised: 08/13/2017 Document Reviewed: 10/12/2016  [employer] Interactive Patient Education © 2017 [employer]

## 2018-05-03 ENCOUNTER — OFFICE VISIT (OUTPATIENT)
Dept: ENDOCRINOLOGY | Facility: CLINIC | Age: 33
End: 2018-05-03

## 2018-05-03 ENCOUNTER — TELEPHONE (OUTPATIENT)
Dept: INTERNAL MEDICINE | Facility: CLINIC | Age: 33
End: 2018-05-03

## 2018-05-03 VITALS
SYSTOLIC BLOOD PRESSURE: 140 MMHG | WEIGHT: 207 LBS | BODY MASS INDEX: 32.49 KG/M2 | OXYGEN SATURATION: 98 % | DIASTOLIC BLOOD PRESSURE: 80 MMHG | HEART RATE: 88 BPM | HEIGHT: 67 IN

## 2018-05-03 DIAGNOSIS — L03.114 CELLULITIS OF LEFT UPPER EXTREMITY: ICD-10-CM

## 2018-05-03 DIAGNOSIS — T36.95XA ANTIBIOTIC-INDUCED YEAST INFECTION: ICD-10-CM

## 2018-05-03 DIAGNOSIS — E10.65 TYPE 1 DIABETES MELLITUS WITH HYPERGLYCEMIA (HCC): Primary | ICD-10-CM

## 2018-05-03 DIAGNOSIS — E89.0 POSTABLATIVE HYPOTHYROIDISM: ICD-10-CM

## 2018-05-03 DIAGNOSIS — B37.9 ANTIBIOTIC-INDUCED YEAST INFECTION: ICD-10-CM

## 2018-05-03 LAB
HBA1C MFR BLD: 9.4 %
T4 FREE SERPL-MCNC: 1.44 NG/DL (ref 0.89–1.76)
TSH SERPL DL<=0.05 MIU/L-ACNC: 1.13 MIU/ML (ref 0.35–5.35)

## 2018-05-03 PROCEDURE — 84443 ASSAY THYROID STIM HORMONE: CPT | Performed by: PHYSICIAN ASSISTANT

## 2018-05-03 PROCEDURE — 83036 HEMOGLOBIN GLYCOSYLATED A1C: CPT | Performed by: PHYSICIAN ASSISTANT

## 2018-05-03 PROCEDURE — 84439 ASSAY OF FREE THYROXINE: CPT | Performed by: PHYSICIAN ASSISTANT

## 2018-05-03 PROCEDURE — 99214 OFFICE O/P EST MOD 30 MIN: CPT | Performed by: PHYSICIAN ASSISTANT

## 2018-05-03 RX ORDER — FLUCONAZOLE 150 MG/1
150 TABLET ORAL ONCE
Qty: 1 TABLET | Refills: 0 | Status: SHIPPED | OUTPATIENT
Start: 2018-05-03 | End: 2018-05-03

## 2018-05-03 RX ORDER — DOXYCYCLINE 100 MG/1
100 CAPSULE ORAL EVERY 12 HOURS SCHEDULED
Qty: 14 CAPSULE | Refills: 0 | Status: SHIPPED | OUTPATIENT
Start: 2018-05-03 | End: 2018-07-23

## 2018-05-03 RX ORDER — AMOXICILLIN 500 MG/1
500 CAPSULE ORAL 3 TIMES DAILY
Qty: 21 CAPSULE | Refills: 0 | Status: SHIPPED | OUTPATIENT
Start: 2018-05-03 | End: 2018-07-23

## 2018-05-03 NOTE — PROGRESS NOTES
Chief Complaint  f/u for Diabetes Mellitus.    HPI   Marisol Cuellar is a 32 y.o. female who is here today for f/u of Diabetes Mellitus type 1. Patient was referred by Mindy Andrews AP*. The initial diagnosis of diabetes was made at age 9.    Previously on Medtronic pump, about 10 years ago. Worked well for her and would like to try again.  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  Has healing ulcer left hand that looks infected. Unsure if was bit by spider. Has hx MRSA. Allergic to sulfa.   Ac1- 9.4 (5/3/18), 11.8 (2/20/18)   Labs reviewed:   2/20/18- GFR 82, TSH 4.6, FT4 1.11, HIV and hep c screen negative, h/h wnl    Diabetic complications: none  Eye exam current (within one year): no. Has appt scheduled.  Foot care and dental care: discussed    Current diabetic medications include:  Tresiba 35U QHS  Humalog- CHO ratio 1:10g, correction 2:50>150    Statin: no, <40 yoa    Past medications: different insulins, pump    Diabetic Monitoring  - checks glucose 4-5x/day  Glucose is ranging- morning sugar 200s past week  Hypoglycemia- some, thinks correction too [facility]. Treats lows with 12oz juice.  Home blood sugar records: did not bring    Nutrition:     Current diet: on average, 2-3 meals per day. No sugary drinks. Does know how to count carbs.  Current exercise: none  Seen RD in past: yes    Has hypothyroidism. Labs as above. Currently on generic levothyroxine. Hx graves levi, s/p ZHU 1996.     The following portions of the patient's history were reviewed and updated by me as appropriate: allergies, current medications, past family history, past social history, past surgical history and problem list.    Past Medical History:   Diagnosis Date   • Anxiety    • Chronic low back pain    • Depression    • Diabetes mellitus     TYPE 1   • Graves disease    • Herniated lumbar intervertebral disc    • History of Papanicolaou smear of cervix 2016?    old PCP. Dr. Lee   • IV drug abuse        Medications    Current  Outpatient Prescriptions:   •  B-D ULTRAFINE III SHORT PEN 31G X 8 MM misc, USE UTD WITH TRESIBA, Disp: , Rfl: 3  •  buPROPion SR (WELLBUTRIN SR) 200 MG 12 hr tablet, 1 po qd, Disp: , Rfl:   •  cetirizine (zyrTEC) 10 MG tablet, Take 10 mg by mouth Every Night., Disp: , Rfl:   •  clonazePAM (KlonoPIN) 1 MG tablet, Take 1 mg by mouth 2 (Two) Times a Day As Needed for Seizures., Disp: , Rfl:   •  fluticasone (FLONASE) 50 MCG/ACT nasal spray, 2 sprays into each nostril Daily., Disp: 1 bottle, Rfl: 3  •  insulin degludec (TRESIBA FLEXTOUCH) 100 UNIT/ML solution pen-injector injection, Inject 30 Units under the skin Daily., Disp: 5 pen, Rfl: 3  •  insulin lispro (HUMALOG) 100 UNIT/ML injection, Take up to total daily dose 50U daily, Disp: 3 mL, Rfl: 12  •  lamoTRIgine (LAMICTAL) 150 MG tablet, Take 150 mg by mouth Daily., Disp: , Rfl:   •  levothyroxine (SYNTHROID, LEVOTHROID) 300 MCG tablet, Take 1 tablet by mouth Daily., Disp: 30 tablet, Rfl: 3  •  venlafaxine (EFFEXOR) 75 MG tablet, Take 150 mg by mouth 2 (Two) Times a Day., Disp: , Rfl:   •  amoxicillin (AMOXIL) 500 MG capsule, Take 1 capsule by mouth 3 (Three) Times a Day., Disp: 21 capsule, Rfl: 0  •  doxycycline (MONODOX) 100 MG capsule, Take 1 capsule by mouth Every 12 (Twelve) Hours., Disp: 14 capsule, Rfl: 0  •  fluconazole (DIFLUCAN) 150 MG tablet, Take 1 tablet by mouth 1 (One) Time for 1 dose., Disp: 1 tablet, Rfl: 0    Review of Systems  Review of Systems   Constitutional: Positive for chills and fatigue. Negative for fever and unexpected weight change.        Feeling poorly   HENT: Negative for ear pain, hearing loss, nosebleeds, rhinorrhea and sore throat.    Eyes: Positive for pain. Negative for discharge, redness, itching and visual disturbance.   Respiratory: Negative for cough, shortness of breath and wheezing.    Cardiovascular: Negative for chest pain, palpitations and leg swelling.   Gastrointestinal: Negative for abdominal pain, blood in stool,  "constipation and diarrhea.   Endocrine: Positive for heat intolerance. Negative for cold intolerance and polydipsia.   Genitourinary: Negative for dysuria, frequency, hematuria, pelvic pain, vaginal bleeding and vaginal discharge.   Musculoskeletal: Negative for arthralgias, gait problem, joint swelling and myalgias.   Skin: Negative for rash.   Allergic/Immunologic: Negative.    Neurological: Negative for dizziness, syncope, weakness, numbness and headaches.   Hematological: Negative for adenopathy. Does not bruise/bleed easily.   Psychiatric/Behavioral: Negative for sleep disturbance and suicidal ideas. The patient is not nervous/anxious.         Physical Exam    /80   Pulse 88   Ht 170.2 cm (67.01\")   Wt 93.9 kg (207 lb)   SpO2 98%   BMI 32.41 kg/m² Body mass index is 32.41 kg/m².  Physical Exam   Constitutional: She is oriented to person, place, and time. She appears well-developed. No distress.   HENT:   Head: Normocephalic.   Right Ear: External ear normal.   Left Ear: External ear normal.   Mouth/Throat: No oropharyngeal exudate.   Eyes: Conjunctivae and lids are normal. Right eye exhibits no discharge. Left eye exhibits no discharge. Right pupil is reactive. Left pupil is reactive.   Neck: No JVD present. No tracheal deviation present. No thyroid mass and no thyromegaly present.   Cardiovascular: Normal rate, regular rhythm, normal heart sounds and intact distal pulses.    No murmur heard.  Pulmonary/Chest: Effort normal and breath sounds normal. No respiratory distress. She has no wheezes.   Abdominal: Soft. Bowel sounds are normal. There is no tenderness.   Musculoskeletal: She exhibits no edema or tenderness.      During the foot exam she had a monofilament test performed.  Vascular Status -  Her right foot exhibits no edema. Her left foot exhibits no edema.  Lymphadenopathy:     She has no cervical adenopathy.   Neurological: She is alert and oriented to person, place, and time.   Skin: Skin " is warm, dry and intact. No rash noted. She is not diaphoretic. No erythema.   Left hand, ulceratioin with erythema and warmth   Psychiatric: She has a normal mood and affect. Her speech is normal and behavior is normal. Thought content normal.       Labs and Imaging   Lab Results   Component Value Date    HGBA1C 9.4 05/03/2018    HGBA1C 11.8 02/20/2018    HGBA1C 12.10 (H) 06/03/2017     Office Visit on 05/03/2018   Component Date Value Ref Range Status   • Hemoglobin A1C 05/03/2018 9.4  % Final   Lab on 04/11/2018   Component Date Value Ref Range Status   • Glucose 04/11/2018 143* 70 - 100 mg/dL Final   • BUN 04/11/2018 10  9 - 23 mg/dL Final   • Creatinine 04/11/2018 0.70  0.60 - 1.30 mg/dL Final   • Sodium 04/11/2018 134  132 - 146 mmol/L Final   • Potassium 04/11/2018 4.2  3.5 - 5.5 mmol/L Final   • Chloride 04/11/2018 99  99 - 109 mmol/L Final   • CO2 04/11/2018 30.0  20.0 - 31.0 mmol/L Final   • Calcium 04/11/2018 9.4  8.7 - 10.4 mg/dL Final   • Total Protein 04/11/2018 6.9  5.7 - 8.2 g/dL Final   • Albumin 04/11/2018 4.50  3.20 - 4.80 g/dL Final   • ALT (SGPT) 04/11/2018 18  7 - 40 U/L Final   • AST (SGOT) 04/11/2018 20  0 - 33 U/L Final   • Alkaline Phosphatase 04/11/2018 96  25 - 100 U/L Final   • Total Bilirubin 04/11/2018 0.6  0.3 - 1.2 mg/dL Final   • eGFR Non African Amer 04/11/2018 96  >60 mL/min/1.73 Final   • Globulin 04/11/2018 2.4  gm/dL Final   • A/G Ratio 04/11/2018 1.9  1.5 - 2.5 g/dL Final   • BUN/Creatinine Ratio 04/11/2018 14.3  7.0 - 25.0 Final   • Anion Gap 04/11/2018 5.0  3.0 - 11.0 mmol/L Final   • Total Cholesterol 04/11/2018 207* 0 - 200 mg/dL Final   • Triglycerides 04/11/2018 126  0 - 150 mg/dL Final   • HDL Cholesterol 04/11/2018 56  40 - 60 mg/dL Final   • LDL Cholesterol  04/11/2018 145* 0 - 130 mg/dL Final   • 25 Hydroxy, Vitamin D 04/11/2018 22.9  ng/ml Final   • Vitamin B-12 04/11/2018 727  211 - 911 pg/mL Final   • Sed Rate 04/11/2018 30* 0 - 20 mm/hr Final   • C-Reactive  Protein 04/11/2018 0.28  0.00 - 1.00 mg/dL Final   • Uric Acid 04/11/2018 2.4* 3.1 - 7.8 mg/dL Final   • EBV VCA IgM 04/13/2018 <36.0  0.0 - 35.9 U/mL Final   • EBV Early Antigen Ab, IgG 04/13/2018 <9.0  0.0 - 8.9 U/mL Final   • EBV VCA IgG 04/13/2018 122.0* 0.0 - 17.9 U/mL Final   • EBV Nuclear Antigen Ab, IgG 04/13/2018 335.0* 0.0 - 17.9 U/mL Final   • Interpretation 04/13/2018 Comment   Final   • MIKE Direct 04/13/2018 Negative  Negative Final   • TSH 04/11/2018 1.245  0.350 - 5.350 mIU/mL Final   • WBC 04/11/2018 8.15  3.50 - 10.80 10*3/mm3 Final   • RBC 04/11/2018 4.44  3.89 - 5.14 10*6/mm3 Final   • Hemoglobin 04/11/2018 14.8  11.5 - 15.5 g/dL Final   • Hematocrit 04/11/2018 43.0  34.5 - 44.0 % Final   • MCV 04/11/2018 96.8  80.0 - 99.0 fL Final   • MCH 04/11/2018 33.3* 27.0 - 31.0 pg Final   • MCHC 04/11/2018 34.4  32.0 - 36.0 g/dL Final   • RDW 04/11/2018 12.9  11.3 - 14.5 % Final   • RDW-SD 04/11/2018 45.8  37.0 - 54.0 fl Final   • MPV 04/11/2018 10.2  6.0 - 12.0 fL Final   • Platelets 04/11/2018 391  150 - 450 10*3/mm3 Final   • Neutrophil % 04/11/2018 65.0  41.0 - 71.0 % Final   • Lymphocyte % 04/11/2018 24.5  24.0 - 44.0 % Final   • Monocyte % 04/11/2018 7.0  0.0 - 12.0 % Final   • Eosinophil % 04/11/2018 2.2  0.0 - 3.0 % Final   • Basophil % 04/11/2018 1.1* 0.0 - 1.0 % Final   • Immature Grans % 04/11/2018 0.2  0.0 - 0.6 % Final   • Neutrophils, Absolute 04/11/2018 5.29  1.50 - 8.30 10*3/mm3 Final   • Lymphocytes, Absolute 04/11/2018 2.00  0.60 - 4.80 10*3/mm3 Final   • Monocytes, Absolute 04/11/2018 0.57  0.00 - 1.00 10*3/mm3 Final   • Eosinophils, Absolute 04/11/2018 0.18  0.00 - 0.30 10*3/mm3 Final   • Basophils, Absolute 04/11/2018 0.09  0.00 - 0.20 10*3/mm3 Final   • Immature Grans, Absolute 04/11/2018 0.02  0.00 - 0.03 10*3/mm3 Final   Office Visit on 04/10/2018   Component Date Value Ref Range Status   • Color 04/10/2018 Yellow  Yellow, Straw, Dark Yellow, Oralia Final   • Clarity, UA 04/10/2018  Clear  Clear Final   • Glucose, UA 04/10/2018 Trace* Negative, 1000 mg/dL (3+) mg/dL Final   • Bilirubin 04/10/2018 Negative  Negative Final   • Ketones, UA 04/10/2018 Negative  Negative Final   • Specific Gravity  04/10/2018 1.010  1.005 - 1.030 Final   • Blood, UA 04/10/2018 Negative  Negative Final   • pH, Urine 04/10/2018 8.0  5.0 - 8.0 Final   • Protein, POC 04/10/2018 Negative  Negative mg/dL Final   • Urobilinogen, UA 04/10/2018 Normal  Normal Final   • Leukocytes 04/10/2018 Negative  Negative Final   • Nitrite, UA 04/10/2018 Negative  Negative Final     No images are attached to the encounter or orders placed in the encounter.  No Images in the past 120 days found..    Assessment / Plan   Marisol was seen today for diabetes and hyperthyroidism.    Diagnoses and all orders for this visit:    Type 1 diabetes mellitus with hyperglycemia  -     POC Glycosylated Hemoglobin (Hb A1C)  -     insulin lispro (HUMALOG) 100 UNIT/ML injection; Take up to total daily dose 50U daily    Postablative hypothyroidism  -     TSH  -     T4, Free    Cellulitis of left upper extremity  -     amoxicillin (AMOXIL) 500 MG capsule; Take 1 capsule by mouth 3 (Three) Times a Day.  -     doxycycline (MONODOX) 100 MG capsule; Take 1 capsule by mouth Every 12 (Twelve) Hours.    Antibiotic-induced yeast infection  -     fluconazole (DIFLUCAN) 150 MG tablet; Take 1 tablet by mouth 1 (One) Time for 1 dose.        Diabetes Mellitus 1 is under poor control.  -A1c 9.4, down from 11.8 2/2018  -no BG log or meter to review  -thinks correction is too much  -tresiba- continue 35U QHS. Morning sugar high for past week but may be bc of hand cellulitis. Once resolves and FBG still high she will increase 1-2U q3days.  -humalog- 1:10 CHO ratio, decrease correction 1:50>150  -she is in the process of getting medtronic pump and cgm. Sending back dexcom  -f/u 3 months    1.  Diet: 3-4 carb servings per meal for females, 4-5 carb servings per meal for  males  Spread carb intake throughout the day  Increase lean protein and vegetable intake  Avoid sugary drinks and processed carbs including crackers, cookies, cakes  2.  Exercise: Recommend at least 30 minutes of exercise daily, at least 5 days per week. Increase exercise gradually.   3.  Blood Glucose Goal: Blood glucose goal <150 fasting, <180 2 hr postprandial  4.  Microalbumin due   5.  Education performed during this visit: long term diabetic complications discussed. , annual eye examinations at Ophthalmology discussed, dental hygiene discussed  and foot care reviewed., home glucose monitoring emphasized, all medications, side effects and compliance discussed carefully and Hypoglycemia management and prevention reviewed. Reviewed ‘ABCs’ of diabetes management (respective goals in parentheses):  A1C (<7), blood pressure (<130/80), and cholesterol (LDL <100, if CVD <70).    Hypothyroidism  -Hx graves dz s/p ZHU at age 10  -TSH 4.6  -increase levothyroxine to 300mcg daily 3/6/18  -Discussed proper way to take levothyroxine.  -recheck labs today    There are no Patient Instructions on file for this visit.    Follow up: Return in about 3 months (around 8/3/2018).    Discussed the nature of the disease including, risks, complications, implications, management, safe and proper use of medications. Encouraged therapeutic lifestyle changes including low calorie diet with plenty of fruits and vegetables, daily exercise, medication compliance, and keeping scheduled follow up appointments with me and any other providers. Encouraged patient to have appointment for complete physical, fasting labs, appropriate screenings, and immunizations on an annual basis.    35 min  of 45 min face-to-face visit time spent for coordination of care and counselling regarding identified problems as outlined in the objective, assessment and discussion portions of the documentation.    Abraham Brown PA-C

## 2018-05-03 NOTE — TELEPHONE ENCOUNTER
THIS PATIENT WAS ASKED TO PROVIDE DOCUMENTATION OF BG READINGS TO ONE OF THE REPS FOR MINIMED. SHE HAS LOST THAT CONTACT INFO AND WANTED TO KNOW IF YOU MIGHT HAVE THAT ON HAND. SHE DOESN'T REMEMBER THE REP'S NAME    CALL BACK  [phone]

## 2018-08-07 ENCOUNTER — OFFICE VISIT (OUTPATIENT)
Dept: ENDOCRINOLOGY | Facility: CLINIC | Age: 33
End: 2018-08-07

## 2018-08-07 VITALS
BODY MASS INDEX: 30.33 KG/M2 | DIASTOLIC BLOOD PRESSURE: 84 MMHG | WEIGHT: 193.25 LBS | SYSTOLIC BLOOD PRESSURE: 120 MMHG | HEART RATE: 101 BPM | OXYGEN SATURATION: 99 %

## 2018-08-07 DIAGNOSIS — E10.29 MICROALBUMINURIA DUE TO TYPE 1 DIABETES MELLITUS (HCC): ICD-10-CM

## 2018-08-07 DIAGNOSIS — E89.0 POSTABLATIVE HYPOTHYROIDISM: ICD-10-CM

## 2018-08-07 DIAGNOSIS — R80.9 MICROALBUMINURIA DUE TO TYPE 1 DIABETES MELLITUS (HCC): ICD-10-CM

## 2018-08-07 DIAGNOSIS — E10.65 TYPE 1 DIABETES MELLITUS WITH HYPERGLYCEMIA (HCC): Primary | ICD-10-CM

## 2018-08-07 PROBLEM — E10.9 TYPE 1 DIABETES MELLITUS WITHOUT COMPLICATION: Status: ACTIVE | Noted: 2017-06-02

## 2018-08-07 LAB
GLUCOSE BLDC GLUCOMTR-MCNC: 290 MG/DL (ref 70–130)
HBA1C MFR BLD: 10.4 %

## 2018-08-07 PROCEDURE — 82947 ASSAY GLUCOSE BLOOD QUANT: CPT | Performed by: PHYSICIAN ASSISTANT

## 2018-08-07 PROCEDURE — 82043 UR ALBUMIN QUANTITATIVE: CPT | Performed by: PHYSICIAN ASSISTANT

## 2018-08-07 PROCEDURE — 82570 ASSAY OF URINE CREATININE: CPT | Performed by: PHYSICIAN ASSISTANT

## 2018-08-07 PROCEDURE — 83036 HEMOGLOBIN GLYCOSYLATED A1C: CPT | Performed by: PHYSICIAN ASSISTANT

## 2018-08-07 PROCEDURE — 99214 OFFICE O/P EST MOD 30 MIN: CPT | Performed by: PHYSICIAN ASSISTANT

## 2018-08-07 RX ORDER — LEVOTHYROXINE SODIUM 300 UG/1
300 TABLET ORAL DAILY
Qty: 30 TABLET | Refills: 6 | Status: SHIPPED | OUTPATIENT
Start: 2018-08-07

## 2018-08-07 NOTE — PROGRESS NOTES
Chief Complaint  f/u for Diabetes Mellitus.    HPI   Marisol Cuellar is a 32 y.o. female who is here today for f/u of Diabetes Mellitus type 1. The initial diagnosis of diabetes was made at age 9.    Started on Medtronic pump 3 weeks ago. No one called her to set up the pump. She pump in settings herself, she used setting from when she had pump in the past.   Overall, she is liking the pump.     Hx IV drug use, several months in recovery, but relapsed a month ago. Now doing ok.      Ac1- 10.4 (8/7/18), 9.4 (5/3/18), 11.8 (2/20/18)   Labs reviewed:   2/20/18- GFR 82, TSH 4.6, FT4 1.11, HIV and hep c screen negative, h/h wnl    Diabetic complications: none  Eye exam current (within one year): no. Has appt scheduled.  Foot care and dental care: discussed    Current diabetic medications include:  Tresiba 35U QHS  Humalog- CHO ratio 1:10g, correction 2:50>150    Statin: no, <40 yoa    Past medications: different insulins, pump    Diabetic Monitoring  -   Hypoglycemia- none  Pump downloaded and reviewed:  -hyperglycemia 9a-12p, 6p-9p  -changing pump site q4days      Nutrition:     Current diet: on average, 2-3 meals per day. No sugary drinks. Does know how to count carbs.  Current exercise: none  Seen RD in past: yes    Has hypothyroidism. Labs as above. Currently on generic levothyroxine. Hx graves dz, s/p ZHU 1996.     The following portions of the patient's history were reviewed and updated by me as appropriate: allergies, current medications, past family history, past social history, past surgical history and problem list.    Past Medical History:   Diagnosis Date   • Anxiety    • Chronic low back pain    • Depression    • Diabetes mellitus (CMS/HCC)     TYPE 1   • Graves disease    • Herniated lumbar intervertebral disc    • History of Papanicolaou smear of cervix 2016?    old PCP. Dr. Lee   • IV drug abuse        Medications    Current Outpatient Prescriptions:   •  B-D ULTRAFINE III SHORT PEN 31G X 8 MM misc,  USE UTD WITH TRESIBA, Disp: , Rfl: 3  •  buPROPion SR (WELLBUTRIN SR) 200 MG 12 hr tablet, 1 po qd, Disp: , Rfl:   •  cetirizine (zyrTEC) 10 MG tablet, Take 10 mg by mouth Every Night., Disp: , Rfl:   •  clonazePAM (KlonoPIN) 1 MG tablet, Take 1 mg by mouth 2 (Two) Times a Day As Needed for Seizures., Disp: , Rfl:   •  insulin lispro (HUMALOG) 100 UNIT/ML injection, Take up to total daily dose 50U daily, Disp: 20 mL, Rfl: 11  •  lamoTRIgine (LAMICTAL) 150 MG tablet, Take 150 mg by mouth Daily., Disp: , Rfl:   •  levothyroxine (SYNTHROID, LEVOTHROID) 300 MCG tablet, Take 1 tablet by mouth Daily., Disp: 30 tablet, Rfl: 6  •  venlafaxine (EFFEXOR) 75 MG tablet, Take 150 mg by mouth 2 (Two) Times a Day., Disp: , Rfl:   •  fluticasone (FLONASE) 50 MCG/ACT nasal spray, 2 sprays into each nostril Daily., Disp: 1 bottle, Rfl: 3  •  glucose blood (ARUN CONTOUR TEST) test strip, Use QID, Disp: 200 each, Rfl: 11    Review of Systems  Review of Systems   Constitutional: Positive for chills and fatigue. Negative for fever and unexpected weight change.        Feeling poorly   HENT: Negative for ear pain, hearing loss, nosebleeds, rhinorrhea and sore throat.    Eyes: Positive for pain. Negative for discharge, redness, itching and visual disturbance.   Respiratory: Negative for cough, shortness of breath and wheezing.    Cardiovascular: Negative for chest pain, palpitations and leg swelling.   Gastrointestinal: Negative for abdominal pain, blood in stool, constipation and diarrhea.   Endocrine: Positive for heat intolerance. Negative for cold intolerance and polydipsia.   Genitourinary: Negative for dysuria, frequency, hematuria, pelvic pain, vaginal bleeding and vaginal discharge.   Musculoskeletal: Negative for arthralgias, gait problem, joint swelling and myalgias.   Skin: Negative for rash.   Allergic/Immunologic: Negative.    Neurological: Negative for dizziness, syncope, weakness, numbness and headaches.   Hematological:  Negative for adenopathy. Does not bruise/bleed easily.   Psychiatric/Behavioral: Negative for sleep disturbance and suicidal ideas. The patient is not nervous/anxious.         Physical Exam    /84   Pulse 101   Wt 87.7 kg (193 lb 4 oz)   SpO2 99%   BMI 30.33 kg/m² Body mass index is 30.33 kg/m².  Physical Exam   Constitutional: She is oriented to person, place, and time. She appears well-developed. No distress.   HENT:   Head: Normocephalic.   Right Ear: External ear normal.   Left Ear: External ear normal.   Mouth/Throat: No oropharyngeal exudate.   Eyes: Conjunctivae and lids are normal. Right eye exhibits no discharge. Left eye exhibits no discharge. Right pupil is reactive. Left pupil is reactive.   Neck: No JVD present. No tracheal deviation present. No thyroid mass and no thyromegaly present.   Cardiovascular: Normal rate, regular rhythm, normal heart sounds and intact distal pulses.    No murmur heard.  Pulmonary/Chest: Effort normal and breath sounds normal. No respiratory distress. She has no wheezes.   Abdominal: Soft. Bowel sounds are normal. There is no tenderness.   Musculoskeletal: She exhibits no edema or tenderness.     Vascular Status -  Her right foot exhibits no edema. Her left foot exhibits no edema.  Lymphadenopathy:     She has no cervical adenopathy.   Neurological: She is alert and oriented to person, place, and time.   Skin: Skin is warm, dry and intact. No rash noted. She is not diaphoretic. No erythema.   Left hand, ulceratioin with erythema and warmth   Psychiatric: She has a normal mood and affect. Her speech is normal and behavior is normal. Thought content normal.       Labs and Imaging   Lab Results   Component Value Date    HGBA1C 10.4 08/07/2018    HGBA1C 9.4 05/03/2018    HGBA1C 11.8 02/20/2018     Office Visit on 08/07/2018   Component Date Value Ref Range Status   • Glucose 08/07/2018 290* 70 - 130 mg/dL Final   • Hemoglobin A1C 08/07/2018 10.4  % Final     No images  are attached to the encounter or orders placed in the encounter.  No Images in the past 120 days found..    Assessment / Plan   Marisol was seen today for follow-up.    Diagnoses and all orders for this visit:    Type 1 diabetes mellitus with hyperglycemia (CMS/[facility])  -     POC Glucose Fingerstick  -     POC Glycosylated Hemoglobin (Hb A1C)  -     Microalbumin / Creatinine Urine Ratio - Urine, Clean Catch  -     insulin lispro (HUMALOG) 100 UNIT/ML injection; Take up to total daily dose 50U daily  -     glucose blood (ARUN CONTOUR TEST) test strip; Use QID    Postablative hypothyroidism  -     levothyroxine (SYNTHROID, LEVOTHROID) 300 MCG tablet; Take 1 tablet by mouth Daily.        Diabetes Mellitus 1 is under poor control, but improving.  -A1c 10.4, up from 9.4 5/2018  -started Medtronic pump on her own 3 weeks ago. Left voicemail for [employer] rep to contact her for further pump training.   -pump downloaded and reviewed  -add new basal rate 1:5 7a-2p, 6p-12a  -discussed need to change pump site q3days    1.  Diet: 3-4 carb servings per meal for females, 4-5 carb servings per meal for males  Spread carb intake throughout the day  Increase lean protein and vegetable intake  Avoid sugary drinks and processed carbs including crackers, cookies, cakes  2.  Exercise: Recommend at least 30 minutes of exercise daily, at least 5 days per week. Increase exercise gradually.   3.  Blood Glucose Goal: Blood glucose goal <150 fasting, <180 2 hr postprandial  4.  Microalbumin - recheck again  5.  Education performed during this visit: long term diabetic complications discussed. , annual eye examinations at Ophthalmology discussed, dental hygiene discussed  and foot care reviewed., home glucose monitoring emphasized, all medications, side effects and compliance discussed carefully and Hypoglycemia management and prevention reviewed. Reviewed ‘ABCs’ of diabetes management (respective goals in parentheses):  A1C (<7), blood  pressure (<130/80), and cholesterol (LDL <100, if CVD <70).    Hypothyroidism  -Hx graves dz s/p ZHU at age 10  -increased levothyroxine to 300mcg daily 3/6/18 - followed by TSH 1.1 5/2018- continue this dose    Microalbuminuria  -recheck again today    There are no Patient Instructions on file for this visit.    Follow up: Return in about 3 months (around 11/7/2018).    Discussed the nature of the disease including, risks, complications, implications, management, safe and proper use of medications. Encouraged therapeutic lifestyle changes including low calorie diet with plenty of fruits and vegetables, daily exercise, medication compliance, and keeping scheduled follow up appointments with me and any other providers. Encouraged patient to have appointment for complete physical, fasting labs, appropriate screenings, and immunizations on an annual basis.    20 min  of 30 min face-to-face visit time spent for coordination of care and counselling regarding identified problems as outlined in the objective, assessment and discussion portions of the documentation.    Abraham Brown PA-C

## 2018-08-08 DIAGNOSIS — E10.29 MICROALBUMINURIA DUE TO TYPE 1 DIABETES MELLITUS (HCC): Primary | ICD-10-CM

## 2018-08-08 DIAGNOSIS — R80.9 MICROALBUMINURIA DUE TO TYPE 1 DIABETES MELLITUS (HCC): Primary | ICD-10-CM

## 2018-08-08 LAB
CREAT 24H UR-MCNC: 112.6 MG/DL
MICROALBUMIN UR-MCNC: 209.3 UG/ML
MICROALBUMIN/CREAT UR: 185.9 MG/G CREAT (ref 0–30)

## 2018-08-08 RX ORDER — LISINOPRIL 2.5 MG/1
2.5 TABLET ORAL DAILY
Qty: 30 TABLET | Refills: 6 | Status: ON HOLD | OUTPATIENT
Start: 2018-08-08 | End: 2019-04-04

## 2018-08-10 ENCOUNTER — TELEPHONE (OUTPATIENT)
Dept: INTERNAL MEDICINE | Facility: CLINIC | Age: 33
End: 2018-08-10

## 2018-08-10 DIAGNOSIS — E10.65 TYPE 1 DIABETES MELLITUS WITH HYPERGLYCEMIA (HCC): ICD-10-CM

## 2018-08-10 NOTE — TELEPHONE ENCOUNTER
PATIENT CALLED STATING THAT HER PHARMACY IS TELLING HER THEY HAVE NOT RECEIVED AN ESCRIPT FOR HER TEST STRIPS. CAN THIS BE RESENT OR CALLED IN FOR HER?

## 2018-08-24 ENCOUNTER — TELEPHONE (OUTPATIENT)
Dept: INTERNAL MEDICINE | Facility: CLINIC | Age: 33
End: 2018-08-24

## 2018-08-24 DIAGNOSIS — E10.65 TYPE 1 DIABETES MELLITUS WITH HYPERGLYCEMIA (HCC): ICD-10-CM

## 2018-08-24 NOTE — TELEPHONE ENCOUNTER
Attemped contact, mailbox full   Pa is completed and Rx E- Scripted to Pharmacy with note attached

## 2018-08-24 NOTE — TELEPHONE ENCOUNTER
JASON,    MS ADORNO CALLED TODAY TO SEE IF A PA REQUEST HAD BEEN RECEIVED BY THIS OFFICE FOR HER CONTOUR NEXT TEST STRIPS. A MESSAGE WAS SENT TO [facility] BUT SHE IS OOTO TODAY. MS ADORNO DID INQUIRE IF THERE WERE ANY SAMPLES AVAILALBE AS SHE IS CURRENTLY OUT OF TEST STRIPS AND HAS NO WAY TO TEST. SHE STATES THE OUT OF POCKET PRICE IS AROUND $30 AND SHE DOESN'T HAVE THE MONEY TO PAY THAT AT THIS TIME. I DID INFORM HER THAT THIS IS NOT AN ITEM THIS OFFICE USUALLY HAS AVAILABLE IN SAMPLES BUT THAT I WOULD HAVE SOMEONE CHECK FOR HER.    CALL BACK [phone]

## 2018-08-24 NOTE — TELEPHONE ENCOUNTER
PATIENT CALLED TO SEE IF THE PRIOR AUTH PAPERWORK WAS RECEIVED BY THIS OFFICE FOR HER ARUN CONTOUR NEXT TEST STRIPS. SHE IS CURRENTLY OUT.    CALL BACK [phone]

## 2018-08-24 NOTE — TELEPHONE ENCOUNTER
Spoke with patient will try and locate PA patient notified that she will need to purchase OTC until completed

## 2018-08-27 NOTE — TELEPHONE ENCOUNTER
Have not received PA. Per pharmacy pt picked up 100 strips on 8/24/2018 and can get a refill on 9/12/2018. Attempted to contact pt but unable to leave message.

## 2018-08-30 ENCOUNTER — TELEPHONE (OUTPATIENT)
Dept: INTERNAL MEDICINE | Facility: CLINIC | Age: 33
End: 2018-08-30

## 2018-08-31 DIAGNOSIS — E10.65 TYPE 1 DIABETES MELLITUS WITH HYPERGLYCEMIA (HCC): Primary | ICD-10-CM

## 2018-09-26 DIAGNOSIS — E10.65 TYPE 1 DIABETES MELLITUS WITH HYPERGLYCEMIA (HCC): ICD-10-CM

## 2018-09-28 ENCOUNTER — TELEPHONE (OUTPATIENT)
Dept: INTERNAL MEDICINE | Facility: CLINIC | Age: 33
End: 2018-09-28

## 2018-09-28 NOTE — TELEPHONE ENCOUNTER
CISCO ADORNO CAME BY OFFICE TODAY, WANTING TO  HER pHYSICAL FORM THAT SHE DROPPED OFF LAST WEEK. SHE WOULD LIKE A PHONE CALL.

## 2018-09-28 NOTE — TELEPHONE ENCOUNTER
Patient notified that her paperwork is ready to  and that we have tried to call her a couple of times but her voicemail box was full and could not leave a msg. She will try and be by today to .

## 2018-10-01 ENCOUNTER — TELEPHONE (OUTPATIENT)
Dept: INTERNAL MEDICINE | Facility: CLINIC | Age: 33
End: 2018-10-01

## 2019-04-03 ENCOUNTER — HOSPITAL ENCOUNTER (INPATIENT)
Facility: HOSPITAL | Age: 34
LOS: 1 days | Discharge: HOME | End: 2019-04-05
Attending: EMERGENCY MEDICINE | Admitting: INTERNAL MEDICINE

## 2019-04-03 ENCOUNTER — APPOINTMENT (OUTPATIENT)
Dept: CT IMAGING | Facility: HOSPITAL | Age: 34
End: 2019-04-03

## 2019-04-03 DIAGNOSIS — R10.9 ABDOMINAL PAIN, UNSPECIFIED ABDOMINAL LOCATION: ICD-10-CM

## 2019-04-03 DIAGNOSIS — E13.10 DIABETIC KETOACIDOSIS WITHOUT COMA ASSOCIATED WITH OTHER SPECIFIED DIABETES MELLITUS (HCC): Primary | ICD-10-CM

## 2019-04-03 PROBLEM — F41.9 ANXIETY AND DEPRESSION: Status: ACTIVE | Noted: 2019-04-03

## 2019-04-03 PROBLEM — F32.A ANXIETY AND DEPRESSION: Status: ACTIVE | Noted: 2019-04-03

## 2019-04-03 PROBLEM — E11.10 DKA (DIABETIC KETOACIDOSES): Status: ACTIVE | Noted: 2019-04-03

## 2019-04-03 PROBLEM — N17.9 ACUTE KIDNEY INJURY (HCC): Status: ACTIVE | Noted: 2019-04-03

## 2019-04-03 PROBLEM — R74.01 ELEVATED TRANSAMINASE LEVEL: Status: ACTIVE | Noted: 2019-04-03

## 2019-04-03 LAB
ALBUMIN SERPL-MCNC: 4.29 G/DL (ref 3.2–4.8)
ALBUMIN/GLOB SERPL: 1.8 G/DL (ref 1.5–2.5)
ALP SERPL-CCNC: 111 U/L (ref 25–100)
ALT SERPL W P-5'-P-CCNC: 89 U/L (ref 7–40)
ANION GAP SERPL CALCULATED.3IONS-SCNC: 19 MMOL/L (ref 3–11)
ARTERIAL PATENCY WRIST A: ABNORMAL
AST SERPL-CCNC: 65 U/L (ref 0–33)
ATMOSPHERIC PRESS: ABNORMAL MMHG
B-HCG UR QL: NEGATIVE
B-OH-BUTYR SERPL-SCNC: 6 MMOL/L
BACTERIA UR QL AUTO: NORMAL /HPF
BASE EXCESS BLDA CALC-SCNC: -6.9 MMOL/L (ref 0–2)
BASOPHILS # BLD AUTO: 0.05 10*3/MM3 (ref 0–0.2)
BASOPHILS NFR BLD AUTO: 0.5 % (ref 0–1)
BDY SITE: ABNORMAL
BILIRUB SERPL-MCNC: 0.6 MG/DL (ref 0.3–1.2)
BILIRUB UR QL STRIP: NEGATIVE
BODY TEMPERATURE: 37 C
BUN BLD-MCNC: 29 MG/DL (ref 9–23)
BUN BLDA-MCNC: 27 MG/DL (ref 8–26)
BUN/CREAT SERPL: 20.6 (ref 7–25)
CA-I BLDA-SCNC: 1.11 MMOL/L (ref 1.2–1.32)
CALCIUM SPEC-SCNC: 9 MG/DL (ref 8.7–10.4)
CHLORIDE BLDA-SCNC: 100 MMOL/L (ref 98–109)
CHLORIDE SERPL-SCNC: 93 MMOL/L (ref 99–109)
CLARITY UR: CLEAR
CO2 BLDA-SCNC: 19 MMOL/L (ref 24–29)
CO2 BLDA-SCNC: 19.7 MMOL/L (ref 23–27)
CO2 SERPL-SCNC: 17 MMOL/L (ref 20–31)
COHGB MFR BLD: 1.3 % (ref 0–2)
COLOR UR: YELLOW
CREAT BLD-MCNC: 1.41 MG/DL (ref 0.6–1.3)
CREAT BLDA-MCNC: 0.7 MG/DL (ref 0.6–1.3)
DEPRECATED RDW RBC AUTO: 44.5 FL (ref 37–54)
EOSINOPHIL # BLD AUTO: 0.05 10*3/MM3 (ref 0–0.3)
EOSINOPHIL NFR BLD AUTO: 0.5 % (ref 0–3)
ERYTHROCYTE [DISTWIDTH] IN BLOOD BY AUTOMATED COUNT: 13 % (ref 11.3–14.5)
GFR SERPL CREATININE-BSD FRML MDRD: 42 ML/MIN/1.73
GLOBULIN UR ELPH-MCNC: 2.4 GM/DL
GLUCOSE BLD-MCNC: 334 MG/DL (ref 70–100)
GLUCOSE BLDC GLUCOMTR-MCNC: 183 MG/DL (ref 70–130)
GLUCOSE BLDC GLUCOMTR-MCNC: 199 MG/DL (ref 70–130)
GLUCOSE BLDC GLUCOMTR-MCNC: 203 MG/DL (ref 70–130)
GLUCOSE BLDC GLUCOMTR-MCNC: 206 MG/DL (ref 70–130)
GLUCOSE BLDC GLUCOMTR-MCNC: 214 MG/DL (ref 70–130)
GLUCOSE BLDC GLUCOMTR-MCNC: 267 MG/DL (ref 70–130)
GLUCOSE BLDC GLUCOMTR-MCNC: 324 MG/DL (ref 70–130)
GLUCOSE UR STRIP-MCNC: ABNORMAL MG/DL
HCO3 BLDA-SCNC: 18.6 MMOL/L (ref 20–26)
HCT VFR BLD AUTO: 44.3 % (ref 34.5–44)
HCT VFR BLD CALC: 41.7 %
HCT VFR BLDA CALC: 40 % (ref 38–51)
HGB BLD-MCNC: 15.1 G/DL (ref 11.5–15.5)
HGB BLDA-MCNC: 13.6 G/DL (ref 12–17)
HGB BLDA-MCNC: 13.6 G/DL (ref 14–18)
HGB UR QL STRIP.AUTO: NEGATIVE
HOROWITZ INDEX BLD+IHG-RTO: 21 %
HYALINE CASTS UR QL AUTO: NORMAL /LPF
IMM GRANULOCYTES # BLD AUTO: 0.02 10*3/MM3 (ref 0–0.05)
IMM GRANULOCYTES NFR BLD AUTO: 0.2 % (ref 0–0.6)
INTERNAL NEGATIVE CONTROL: NORMAL
INTERNAL POSITIVE CONTROL: NORMAL
KETONES UR QL STRIP: ABNORMAL
LEUKOCYTE ESTERASE UR QL STRIP.AUTO: NEGATIVE
LYMPHOCYTES # BLD AUTO: 1.72 10*3/MM3 (ref 0.6–4.8)
LYMPHOCYTES NFR BLD AUTO: 18.3 % (ref 24–44)
Lab: NORMAL
MCH RBC QN AUTO: 31.9 PG (ref 27–31)
MCHC RBC AUTO-ENTMCNC: 34.1 G/DL (ref 32–36)
MCV RBC AUTO: 93.7 FL (ref 80–99)
METHGB BLD QL: 1.3 % (ref 0–1.5)
MODALITY: ABNORMAL
MONOCYTES # BLD AUTO: 0.33 10*3/MM3 (ref 0–1)
MONOCYTES NFR BLD AUTO: 3.5 % (ref 0–12)
NEUTROPHILS # BLD AUTO: 7.23 10*3/MM3 (ref 1.5–8.3)
NEUTROPHILS NFR BLD AUTO: 77.2 % (ref 41–71)
NITRITE UR QL STRIP: NEGATIVE
NOTE: ABNORMAL
OXYHGB MFR BLDV: 95.1 % (ref 94–99)
PCO2 BLDA: 36.5 MM HG (ref 35–45)
PCO2 TEMP ADJ BLD: 36.5 MM HG (ref 35–45)
PH BLDA: 7.32 PH UNITS (ref 7.35–7.45)
PH UR STRIP.AUTO: <=5 [PH] (ref 5–8)
PH, TEMP CORRECTED: 7.32 PH UNITS
PLATELET # BLD AUTO: 495 10*3/MM3 (ref 150–450)
PMV BLD AUTO: 9.8 FL (ref 6–12)
PO2 BLDA: 94.5 MM HG (ref 83–108)
PO2 TEMP ADJ BLD: 94.5 MM HG (ref 83–108)
POTASSIUM BLD-SCNC: 4.6 MMOL/L (ref 3.5–5.5)
POTASSIUM BLDA-SCNC: 4.1 MMOL/L (ref 3.5–4.9)
PROT SERPL-MCNC: 6.7 G/DL (ref 5.7–8.2)
PROT UR QL STRIP: ABNORMAL
RBC # BLD AUTO: 4.73 10*6/MM3 (ref 3.89–5.14)
RBC # UR: NORMAL /HPF
REF LAB TEST METHOD: NORMAL
SODIUM BLD-SCNC: 129 MMOL/L (ref 132–146)
SODIUM BLDA-SCNC: 136 MMOL/L (ref 138–146)
SP GR UR STRIP: 1.02 (ref 1–1.03)
SQUAMOUS #/AREA URNS HPF: NORMAL /HPF
UROBILINOGEN UR QL STRIP: ABNORMAL
VENTILATOR MODE: ABNORMAL
WBC NRBC COR # BLD: 9.38 10*3/MM3 (ref 3.5–10.8)
WBC UR QL AUTO: NORMAL /HPF

## 2019-04-03 PROCEDURE — 82805 BLOOD GASES W/O2 SATURATION: CPT

## 2019-04-03 PROCEDURE — 85014 HEMATOCRIT: CPT

## 2019-04-03 PROCEDURE — 25010000002 ONDANSETRON PER 1 MG: Performed by: EMERGENCY MEDICINE

## 2019-04-03 PROCEDURE — 25010000002 HYDROMORPHONE PER 4 MG: Performed by: EMERGENCY MEDICINE

## 2019-04-03 PROCEDURE — 74176 CT ABD & PELVIS W/O CONTRAST: CPT

## 2019-04-03 PROCEDURE — 82010 KETONE BODYS QUAN: CPT | Performed by: EMERGENCY MEDICINE

## 2019-04-03 PROCEDURE — 99223 1ST HOSP IP/OBS HIGH 75: CPT | Performed by: INTERNAL MEDICINE

## 2019-04-03 PROCEDURE — 80053 COMPREHEN METABOLIC PANEL: CPT | Performed by: EMERGENCY MEDICINE

## 2019-04-03 PROCEDURE — 80047 BASIC METABLC PNL IONIZED CA: CPT

## 2019-04-03 PROCEDURE — 36600 WITHDRAWAL OF ARTERIAL BLOOD: CPT

## 2019-04-03 PROCEDURE — 82962 GLUCOSE BLOOD TEST: CPT

## 2019-04-03 PROCEDURE — 81001 URINALYSIS AUTO W/SCOPE: CPT | Performed by: EMERGENCY MEDICINE

## 2019-04-03 PROCEDURE — 80306 DRUG TEST PRSMV INSTRMNT: CPT | Performed by: INTERNAL MEDICINE

## 2019-04-03 PROCEDURE — 85025 COMPLETE CBC W/AUTO DIFF WBC: CPT

## 2019-04-03 PROCEDURE — 81025 URINE PREGNANCY TEST: CPT | Performed by: EMERGENCY MEDICINE

## 2019-04-03 PROCEDURE — 99285 EMERGENCY DEPT VISIT HI MDM: CPT

## 2019-04-03 RX ORDER — ONDANSETRON 2 MG/ML
4 INJECTION INTRAMUSCULAR; INTRAVENOUS ONCE
Status: COMPLETED | OUTPATIENT
Start: 2019-04-03 | End: 2019-04-03

## 2019-04-03 RX ORDER — SODIUM CHLORIDE 0.9 % (FLUSH) 0.9 %
10 SYRINGE (ML) INJECTION AS NEEDED
Status: DISCONTINUED | OUTPATIENT
Start: 2019-04-03 | End: 2019-04-03

## 2019-04-03 RX ORDER — HYDROMORPHONE HYDROCHLORIDE 1 MG/ML
0.5 INJECTION, SOLUTION INTRAMUSCULAR; INTRAVENOUS; SUBCUTANEOUS ONCE
Status: COMPLETED | OUTPATIENT
Start: 2019-04-03 | End: 2019-04-03

## 2019-04-03 RX ORDER — SODIUM CHLORIDE 0.9 % (FLUSH) 0.9 %
10 SYRINGE (ML) INJECTION AS NEEDED
Status: DISCONTINUED | OUTPATIENT
Start: 2019-04-03 | End: 2019-04-05 | Stop reason: HOSPADM

## 2019-04-03 RX ORDER — SODIUM CHLORIDE 9 MG/ML
250 INJECTION, SOLUTION INTRAVENOUS CONTINUOUS
Status: DISCONTINUED | OUTPATIENT
Start: 2019-04-03 | End: 2019-04-04

## 2019-04-03 RX ADMIN — ONDANSETRON 4 MG: 2 INJECTION INTRAMUSCULAR; INTRAVENOUS at 19:04

## 2019-04-03 RX ADMIN — SODIUM CHLORIDE 1000 ML: 9 INJECTION, SOLUTION INTRAVENOUS at 18:54

## 2019-04-03 RX ADMIN — HYDROMORPHONE HYDROCHLORIDE 0.5 MG: 1 INJECTION, SOLUTION INTRAMUSCULAR; INTRAVENOUS; SUBCUTANEOUS at 20:48

## 2019-04-03 RX ADMIN — SODIUM CHLORIDE 250 ML/HR: 9 INJECTION, SOLUTION INTRAVENOUS at 21:49

## 2019-04-03 RX ADMIN — SODIUM CHLORIDE 1000 ML: 9 INJECTION, SOLUTION INTRAVENOUS at 16:37

## 2019-04-03 RX ADMIN — HYDROMORPHONE HYDROCHLORIDE 0.5 MG: 1 INJECTION, SOLUTION INTRAMUSCULAR; INTRAVENOUS; SUBCUTANEOUS at 16:38

## 2019-04-03 RX ADMIN — ONDANSETRON 4 MG: 2 INJECTION INTRAMUSCULAR; INTRAVENOUS at 16:38

## 2019-04-03 RX ADMIN — HYDROMORPHONE HYDROCHLORIDE 0.5 MG: 1 INJECTION, SOLUTION INTRAMUSCULAR; INTRAVENOUS; SUBCUTANEOUS at 19:10

## 2019-04-03 NOTE — ED PROVIDER NOTES
Subjective   Marisol Cuellar is a 33 y.o. female who presents to the ED with complaints of hyperglycemia. The patient has diabetes mellitus and reports that she ran out of test strips to check her blood sugar last night and ran out of supplies for her insulin pump 3 days ago. She reports that her blood sugar level was 500 last night and was 397 when she checked at her mom's house earlier today. She states that she has been experiencing chills, nausea, fatigue, dizziness, palpitations, abdominal cramping, and arm and leg pain since last night. She also reports that she has ketones in her urine. She denies fever, vomiting, diarrhea, cough, rhinorrhea, and chest pain. She states an hour and a half ago she administered an insulin shot. There are no other acute complaints at this time.        History provided by:  Patient  Hyperglycemia   Blood sugar level PTA:  397  Severity:  Moderate  Onset quality:  Sudden  Duration:  1 day  Diabetes status:  Controlled with insulin  Associated symptoms: abdominal pain, dizziness, fatigue and nausea    Associated symptoms: no chest pain, no fever and no vomiting        Review of Systems   Constitutional: Positive for chills and fatigue. Negative for fever.   HENT: Negative for rhinorrhea.    Respiratory: Negative for cough.    Cardiovascular: Positive for palpitations. Negative for chest pain.   Gastrointestinal: Positive for abdominal pain and nausea. Negative for diarrhea and vomiting.   Musculoskeletal: Positive for arthralgias (arm and leg pain).   Neurological: Positive for dizziness.   All other systems reviewed and are negative.      Past Medical History:   Diagnosis Date   • Anxiety    • Chronic low back pain    • Depression    • Diabetes mellitus (CMS/[facility])     TYPE 1   • Graves disease    • Herniated lumbar intervertebral disc    • History of Papanicolaou smear of cervix 2016?    old PCP. Dr. Lee   • IV drug abuse (CMS/[facility])        Allergies   Allergen Reactions   • Sulfa  Antibiotics Anaphylaxis   • Peanut-Containing Drug Products Other (See Comments)     Coughing and tightness in chest   • Shellfish-Derived Products Other (See Comments)     Itchy throat and tightness   • Dairy Aid [Lactase] Rash     migraines   • Eggs Or Egg-Derived Products Rash     migraines   • Soybean-Containing Drug Products Rash     migraines   • Wheat Extract Rash     migraines       Past Surgical History:   Procedure Laterality Date   •  SECTION       and    • ENDOMETRIAL ABLATION     • FOOT SURGERY      METAL DINA PLACED IN FOOT   • SINUS SURGERY      ,    • TONSILLECTOMY         Family History   Problem Relation Age of Onset   • Heart attack Father 57   • Diabetes type I Father    • Macular degeneration Mother    • Fibromyalgia Mother    • Diabetes type II Brother    • Congenital heart disease Son         hypertrophic cardiomyopathy       Social History     Socioeconomic History   • Marital status: Single     Spouse name: Not on file   • Number of children: Not on file   • Years of education: Not on file   • Highest education level: Not on file   Tobacco Use   • Smoking status: Former Smoker   • Smokeless tobacco: Never Used   • Tobacco comment: QUIT 2017   Substance and Sexual Activity   • Alcohol use: No   • Drug use: No     Comment: In recovery. 90 days now. IV drug use and Marijuana   • Sexual activity: Defer     Comment:    Social History Narrative    Live with boyfriend and son          Objective   Physical Exam   Constitutional: She is oriented to person, place, and time. She appears well-developed and well-nourished. No distress.   HENT:   Head: Normocephalic and atraumatic.   Pharynx benign. Airway patent.    Eyes: Conjunctivae are normal. No scleral icterus.   Neck: Normal range of motion. Neck supple.   Cardiovascular: Normal rate, regular rhythm and normal heart sounds.   Pulmonary/Chest: Effort normal and breath sounds normal.   Abdominal: Soft.  There is tenderness.   Tenderness of mid and upper abdomen.    Musculoskeletal: Normal range of motion.   Lymphadenopathy:     She has no cervical adenopathy.   Neurological: She is alert and oriented to person, place, and time.   Skin: Skin is warm and dry.   Psychiatric: She has a normal mood and affect. Her behavior is normal.   Nursing note and vitals reviewed.      Critical Care  Performed by: Daniel Gale MD  Authorized by: Daniel Gale MD     Critical care provider statement:     Critical care time (minutes):  45    Critical care time was exclusive of:  Separately billable procedures and treating other patients and teaching time    Critical care was necessary to treat or prevent imminent or life-threatening deterioration of the following conditions:  Metabolic crisis    Critical care was time spent personally by me on the following activities:  Development of treatment plan with patient or surrogate, discussions with consultants, evaluation of patient's response to treatment, examination of patient, obtaining history from patient or surrogate, review of old charts, re-evaluation of patient's condition, pulse oximetry, ordering and review of radiographic studies, ordering and review of laboratory studies and ordering and performing treatments and interventions  Comments:      Critical situation is DKA.  She had already taken insulin, so interventions were symptomatic care with antinauseant and pain medication.  Significant fluid resuscitation for her DKA and for prolonged hypotension.  Multiple rechecks with observation of modest improvement in her pain and sense of well-being.  Good drop in blood sugars.  Normalizing of BP.  However, still acidotic, so admitted.               ED Course  ED Course as of Apr 03 2129   Wed Apr 03, 2019   1849 The patient is still hypotensive and more fluids will be administered.   [JE]   1905 Dr. Gale reevaluted the patient and she is still experiencing a lot abdominal  pain and persistent hypotension.   [JE]   2013 Dr. Gale reevaluted the patient and she states that her legs and abdomen are still in pain.   [JE]      ED Course User Index  [JE] [email or web address]     Recent Results (from the past 24 hour(s))   POC Glucose Once    Collection Time: 04/03/19  3:30 PM   Result Value Ref Range    Glucose 324 (H) 70 - 130 mg/dL   Comprehensive Metabolic Panel    Collection Time: 04/03/19  3:32 PM   Result Value Ref Range    Glucose 334 (H) 70 - 100 mg/dL    BUN 29 (H) 9 - 23 mg/dL    Creatinine 1.41 (H) 0.60 - 1.30 mg/dL    Sodium 129 (L) 132 - 146 mmol/L    Potassium 4.6 3.5 - 5.5 mmol/L    Chloride 93 (L) 99 - 109 mmol/L    CO2 17.0 (L) 20.0 - 31.0 mmol/L    Calcium 9.0 8.7 - 10.4 mg/dL    Total Protein 6.7 5.7 - 8.2 g/dL    Albumin 4.29 3.20 - 4.80 g/dL    ALT (SGPT) 89 (H) 7 - 40 U/L    AST (SGOT) 65 (H) 0 - 33 U/L    Alkaline Phosphatase 111 (H) 25 - 100 U/L    Total Bilirubin 0.6 0.3 - 1.2 mg/dL    eGFR Non African Amer 42 (L) >60 mL/min/1.73    Globulin 2.4 gm/dL    A/G Ratio 1.8 1.5 - 2.5 g/dL    BUN/Creatinine Ratio 20.6 7.0 - 25.0    Anion Gap 19.0 (H) 3.0 - 11.0 mmol/L   CBC Auto Differential    Collection Time: 04/03/19  3:32 PM   Result Value Ref Range    WBC 9.38 3.50 - 10.80 10*3/mm3    RBC 4.73 3.89 - 5.14 10*6/mm3    Hemoglobin 15.1 11.5 - 15.5 g/dL    Hematocrit 44.3 (H) 34.5 - 44.0 %    MCV 93.7 80.0 - 99.0 fL    MCH 31.9 (H) 27.0 - 31.0 pg    MCHC 34.1 32.0 - 36.0 g/dL    RDW 13.0 11.3 - 14.5 %    RDW-SD 44.5 37.0 - 54.0 fl    MPV 9.8 6.0 - 12.0 fL    Platelets 495 (H) 150 - 450 10*3/mm3    Neutrophil % 77.2 (H) 41.0 - 71.0 %    Lymphocyte % 18.3 (L) 24.0 - 44.0 %    Monocyte % 3.5 0.0 - 12.0 %    Eosinophil % 0.5 0.0 - 3.0 %    Basophil % 0.5 0.0 - 1.0 %    Immature Grans % 0.2 0.0 - 0.6 %    Neutrophils, Absolute 7.23 1.50 - 8.30 10*3/mm3    Lymphocytes, Absolute 1.72 0.60 - 4.80 10*3/mm3    Monocytes, Absolute 0.33 0.00 - 1.00 10*3/mm3    Eosinophils, Absolute  0.05 0.00 - 0.30 10*3/mm3    Basophils, Absolute 0.05 0.00 - 0.20 10*3/mm3    Immature Grans, Absolute 0.02 0.00 - 0.05 10*3/mm3   Beta Hydroxybutyrate Quantitative    Collection Time: 04/03/19  3:32 PM   Result Value Ref Range    Beta-Hydroxybutyrate Quant 6.000 (H) <=0.500 mmol/L   Urinalysis With Microscopic If Indicated (No Culture) - Urine, Clean Catch    Collection Time: 04/03/19  3:35 PM   Result Value Ref Range    Color, UA Yellow Yellow, Straw    Appearance, UA Clear Clear    pH, UA <=5.0 5.0 - 8.0    Specific Gravity, UA 1.023 1.001 - 1.030    Glucose, UA >=1000 mg/dL (3+) (A) Negative    Ketones, UA >=160 mg/dL (4+) (A) Negative    Bilirubin, UA Negative Negative    Blood, UA Negative Negative    Protein, UA 30 mg/dL (1+) (A) Negative    Leuk Esterase, UA Negative Negative    Nitrite, UA Negative Negative    Urobilinogen, UA 0.2 E.U./dL 0.2 - 1.0 E.U./dL   Urinalysis, Microscopic Only - Urine, Clean Catch    Collection Time: 04/03/19  3:35 PM   Result Value Ref Range    RBC, UA 0-2 None Seen, 0-2 /HPF    WBC, UA 0-2 None Seen, 0-2 /HPF    Bacteria, UA None Seen None Seen, Trace /HPF    Squamous Epithelial Cells, UA 0-2 None Seen, 0-2 /HPF    Hyaline Casts, UA 0-6 0 - 6 /LPF    Methodology Automated Microscopy    POCT pregnancy, urine    Collection Time: 04/03/19  4:01 PM   Result Value Ref Range    HCG, Urine, QL Negative Negative    Lot Number [id number]     Internal Positive Control Presumptive Positive     Internal Negative Control Presumptive Negative    POC Glucose Once    Collection Time: 04/03/19  4:57 PM   Result Value Ref Range    Glucose 267 (H) 70 - 130 mg/dL   POC Glucose Once    Collection Time: 04/03/19  6:39 PM   Result Value Ref Range    Glucose 214 (H) 70 - 130 mg/dL   POC Glucose Once    Collection Time: 04/03/19  7:12 PM   Result Value Ref Range    Glucose 203 (H) 70 - 130 mg/dL   POC CHEM 8    Collection Time: 04/03/19  8:50 PM   Result Value Ref Range    Glucose 206 (H) 70 - 130 mg/dL     BUN 27 (H) 8 - 26 mg/dL    Creatinine 0.70 0.60 - 1.30 mg/dL    Sodium 136 (L) 138 - 146 mmol/L    Potassium 4.1 3.5 - 4.9 mmol/L    Chloride 100 98 - 109 mmol/L    Total CO2 19 (L) 24 - 29 mmol/L    Hemoglobin 13.6 12.0 - 17.0 g/dL    Hematocrit 40 38 - 51 %    Ionized Calcium 1.11 (L) 1.20 - 1.32 mmol/L     Note: In addition to lab results from this visit, the labs listed above may include labs taken at another facility or during a different encounter within the last 24 hours. Please correlate lab times with ED admission and discharge times for further clarification of the services performed during this visit.    CT Abdomen Pelvis Without Contrast   Final Result   No acute abnormality. No renal or bowel or biliary obstruction. Normal appendix.         Signer Name: Ronald Benson MD    Signed: 4/3/2019 7:42 PM    Workstation Name: [id number]            Vitals:    04/03/19 1730 04/03/19 1830 04/03/19 2000 04/03/19 2051   BP: 94/57 93/59 109/68 97/49   Pulse: 82 84 86 81   Resp: 18 16 16 16   Temp:       SpO2: 95% 95% 97% 98%   Weight:       Height:         Medications   sodium chloride 0.9 % flush 10 mL (not administered)   sodium chloride 0.9 % infusion (not administered)   sodium chloride 0.9 % bolus 1,000 mL (0 mL Intravenous Stopped 4/3/19 1741)   ondansetron (ZOFRAN) injection 4 mg (4 mg Intravenous Given 4/3/19 1638)   HYDROmorphone (DILAUDID) injection 0.5 mg (0.5 mg Intravenous Given 4/3/19 1638)   sodium chloride 0.9 % bolus 1,000 mL (1,000 mL Intravenous New Bag 4/3/19 1854)   ondansetron (ZOFRAN) injection 4 mg (4 mg Intravenous Given 4/3/19 1904)   HYDROmorphone (DILAUDID) injection 0.5 mg (0.5 mg Intravenous Given 4/3/19 1910)   HYDROmorphone (DILAUDID) injection 0.5 mg (0.5 mg Intravenous Given 4/3/19 2048)     ECG/EMG Results (last 24 hours)     ** No results found for the last 24 hours. **        No orders to display                       MDM    Final diagnoses:   Diabetic ketoacidosis without  coma associated with other specified diabetes mellitus (CMS/HCC)   Abdominal pain, unspecified abdominal location       Documentation assistance provided by edwin Patel.  Information recorded by the scribatif was done at my direction and has been verified and validated by me.     Gladys Patel  04/03/19 1718       Gladys Patel  04/03/19 1448       Daniel Gale MD  04/03/19 [id number]

## 2019-04-04 ENCOUNTER — APPOINTMENT (OUTPATIENT)
Dept: ULTRASOUND IMAGING | Facility: HOSPITAL | Age: 34
End: 2019-04-04

## 2019-04-04 ENCOUNTER — TELEPHONE (OUTPATIENT)
Dept: INTERNAL MEDICINE | Facility: CLINIC | Age: 34
End: 2019-04-04

## 2019-04-04 ENCOUNTER — APPOINTMENT (OUTPATIENT)
Dept: GENERAL RADIOLOGY | Facility: HOSPITAL | Age: 34
End: 2019-04-04

## 2019-04-04 PROBLEM — D75.839 THROMBOCYTOSIS: Status: ACTIVE | Noted: 2019-04-04

## 2019-04-04 LAB
ALBUMIN SERPL-MCNC: 3.23 G/DL (ref 3.2–4.8)
ALBUMIN/GLOB SERPL: 1.6 G/DL (ref 1.5–2.5)
ALP SERPL-CCNC: 76 U/L (ref 25–100)
ALT SERPL W P-5'-P-CCNC: 62 U/L (ref 7–40)
AMPHET+METHAMPHET UR QL: NEGATIVE
AMPHETAMINES UR QL: NEGATIVE
ANION GAP SERPL CALCULATED.3IONS-SCNC: 14 MMOL/L (ref 3–11)
ANION GAP SERPL CALCULATED.3IONS-SCNC: 6 MMOL/L (ref 3–11)
ANION GAP SERPL CALCULATED.3IONS-SCNC: 8 MMOL/L (ref 3–11)
AST SERPL-CCNC: 44 U/L (ref 0–33)
BARBITURATES UR QL SCN: NEGATIVE
BASOPHILS # BLD AUTO: 0.04 10*3/MM3 (ref 0–0.2)
BASOPHILS NFR BLD AUTO: 0.7 % (ref 0–1)
BENZODIAZ UR QL SCN: NEGATIVE
BILIRUB SERPL-MCNC: 0.5 MG/DL (ref 0.3–1.2)
BUN BLD-MCNC: 18 MG/DL (ref 9–23)
BUN BLD-MCNC: 19 MG/DL (ref 9–23)
BUN BLD-MCNC: 21 MG/DL (ref 9–23)
BUN/CREAT SERPL: 20 (ref 7–25)
BUN/CREAT SERPL: 20.8 (ref 7–25)
BUN/CREAT SERPL: 20.9 (ref 7–25)
BUPRENORPHINE SERPL-MCNC: NEGATIVE NG/ML
CA-I SERPL ISE-MCNC: 1.21 MMOL/L (ref 1.12–1.32)
CA-I SERPL ISE-MCNC: 1.23 MMOL/L (ref 1.12–1.32)
CALCIUM SPEC-SCNC: 7.6 MG/DL (ref 8.7–10.4)
CALCIUM SPEC-SCNC: 7.6 MG/DL (ref 8.7–10.4)
CALCIUM SPEC-SCNC: 7.7 MG/DL (ref 8.7–10.4)
CANNABINOIDS SERPL QL: POSITIVE
CHLORIDE SERPL-SCNC: 102 MMOL/L (ref 99–109)
CHLORIDE SERPL-SCNC: 107 MMOL/L (ref 99–109)
CHLORIDE SERPL-SCNC: 108 MMOL/L (ref 99–109)
CO2 SERPL-SCNC: 19 MMOL/L (ref 20–31)
CO2 SERPL-SCNC: 22 MMOL/L (ref 20–31)
CO2 SERPL-SCNC: 24 MMOL/L (ref 20–31)
COCAINE UR QL: POSITIVE
CREAT BLD-MCNC: 0.9 MG/DL (ref 0.6–1.3)
CREAT BLD-MCNC: 0.91 MG/DL (ref 0.6–1.3)
CREAT BLD-MCNC: 1.01 MG/DL (ref 0.6–1.3)
D-LACTATE SERPL-SCNC: 0.6 MMOL/L (ref 0.5–2)
DEPRECATED RDW RBC AUTO: 44.1 FL (ref 37–54)
EOSINOPHIL # BLD AUTO: 0.1 10*3/MM3 (ref 0–0.3)
EOSINOPHIL NFR BLD AUTO: 1.7 % (ref 0–3)
ERYTHROCYTE [DISTWIDTH] IN BLOOD BY AUTOMATED COUNT: 13 % (ref 11.3–14.5)
FLUAV SUBTYP SPEC NAA+PROBE: NOT DETECTED
FLUBV RNA ISLT QL NAA+PROBE: NOT DETECTED
GFR SERPL CREATININE-BSD FRML MDRD: 62 ML/MIN/1.73
GFR SERPL CREATININE-BSD FRML MDRD: 70 ML/MIN/1.73
GFR SERPL CREATININE-BSD FRML MDRD: 71 ML/MIN/1.73
GLOBULIN UR ELPH-MCNC: 2 GM/DL
GLUCOSE BLD-MCNC: 133 MG/DL (ref 70–100)
GLUCOSE BLD-MCNC: 155 MG/DL (ref 70–100)
GLUCOSE BLD-MCNC: 239 MG/DL (ref 70–100)
GLUCOSE BLDC GLUCOMTR-MCNC: 107 MG/DL (ref 70–130)
GLUCOSE BLDC GLUCOMTR-MCNC: 111 MG/DL (ref 70–130)
GLUCOSE BLDC GLUCOMTR-MCNC: 112 MG/DL (ref 70–130)
GLUCOSE BLDC GLUCOMTR-MCNC: 142 MG/DL (ref 70–130)
GLUCOSE BLDC GLUCOMTR-MCNC: 157 MG/DL (ref 70–130)
GLUCOSE BLDC GLUCOMTR-MCNC: 165 MG/DL (ref 70–130)
GLUCOSE BLDC GLUCOMTR-MCNC: 178 MG/DL (ref 70–130)
GLUCOSE BLDC GLUCOMTR-MCNC: 207 MG/DL (ref 70–130)
GLUCOSE BLDC GLUCOMTR-MCNC: 212 MG/DL (ref 70–130)
GLUCOSE BLDC GLUCOMTR-MCNC: 217 MG/DL (ref 70–130)
GLUCOSE BLDC GLUCOMTR-MCNC: 246 MG/DL (ref 70–130)
GLUCOSE BLDC GLUCOMTR-MCNC: 251 MG/DL (ref 70–130)
GLUCOSE BLDC GLUCOMTR-MCNC: 257 MG/DL (ref 70–130)
GLUCOSE BLDC GLUCOMTR-MCNC: 91 MG/DL (ref 70–130)
GLUCOSE BLDC GLUCOMTR-MCNC: 99 MG/DL (ref 70–130)
HAV IGM SERPL QL IA: NORMAL
HBA1C MFR BLD: 11.3 % (ref 4.8–5.6)
HBV CORE IGM SERPL QL IA: NORMAL
HBV SURFACE AG SERPL QL IA: NORMAL
HCT VFR BLD AUTO: 36.5 % (ref 34.5–44)
HCV AB SER DONR QL: NORMAL
HGB BLD-MCNC: 12.4 G/DL (ref 11.5–15.5)
IMM GRANULOCYTES # BLD AUTO: 0.02 10*3/MM3 (ref 0–0.05)
IMM GRANULOCYTES NFR BLD AUTO: 0.3 % (ref 0–0.6)
LYMPHOCYTES # BLD AUTO: 1.77 10*3/MM3 (ref 0.6–4.8)
LYMPHOCYTES NFR BLD AUTO: 30.4 % (ref 24–44)
MAGNESIUM SERPL-MCNC: 1.9 MG/DL (ref 1.3–2.7)
MAGNESIUM SERPL-MCNC: 1.9 MG/DL (ref 1.3–2.7)
MCH RBC QN AUTO: 31.1 PG (ref 27–31)
MCHC RBC AUTO-ENTMCNC: 34 G/DL (ref 32–36)
MCV RBC AUTO: 91.5 FL (ref 80–99)
METHADONE UR QL SCN: NEGATIVE
MONOCYTES # BLD AUTO: 0.38 10*3/MM3 (ref 0–1)
MONOCYTES NFR BLD AUTO: 6.5 % (ref 0–12)
NEUTROPHILS # BLD AUTO: 3.52 10*3/MM3 (ref 1.5–8.3)
NEUTROPHILS NFR BLD AUTO: 60.4 % (ref 41–71)
OPIATES UR QL: NEGATIVE
OXYCODONE UR QL SCN: NEGATIVE
PCP UR QL SCN: NEGATIVE
PHOSPHATE SERPL-MCNC: 1.8 MG/DL (ref 2.4–5.1)
PHOSPHATE SERPL-MCNC: 1.9 MG/DL (ref 2.4–5.1)
PLATELET # BLD AUTO: 363 10*3/MM3 (ref 150–450)
PMV BLD AUTO: 9.3 FL (ref 6–12)
POTASSIUM BLD-SCNC: 3.2 MMOL/L (ref 3.5–5.5)
POTASSIUM BLD-SCNC: 3.3 MMOL/L (ref 3.5–5.5)
POTASSIUM BLD-SCNC: 3.9 MMOL/L (ref 3.5–5.5)
PROPOXYPH UR QL: NEGATIVE
PROT SERPL-MCNC: 5.2 G/DL (ref 5.7–8.2)
RBC # BLD AUTO: 3.99 10*6/MM3 (ref 3.89–5.14)
SODIUM BLD-SCNC: 135 MMOL/L (ref 132–146)
SODIUM BLD-SCNC: 137 MMOL/L (ref 132–146)
SODIUM BLD-SCNC: 138 MMOL/L (ref 132–146)
TRICYCLICS UR QL SCN: NEGATIVE
TSH SERPL DL<=0.05 MIU/L-ACNC: 18.93 MIU/ML (ref 0.35–5.35)
WBC NRBC COR # BLD: 5.83 10*3/MM3 (ref 3.5–10.8)

## 2019-04-04 PROCEDURE — 84443 ASSAY THYROID STIM HORMONE: CPT | Performed by: PHYSICIAN ASSISTANT

## 2019-04-04 PROCEDURE — 83735 ASSAY OF MAGNESIUM: CPT | Performed by: PHYSICIAN ASSISTANT

## 2019-04-04 PROCEDURE — 82330 ASSAY OF CALCIUM: CPT | Performed by: PHYSICIAN ASSISTANT

## 2019-04-04 PROCEDURE — 80053 COMPREHEN METABOLIC PANEL: CPT | Performed by: INTERNAL MEDICINE

## 2019-04-04 PROCEDURE — 87502 INFLUENZA DNA AMP PROBE: CPT | Performed by: PHYSICIAN ASSISTANT

## 2019-04-04 PROCEDURE — 84100 ASSAY OF PHOSPHORUS: CPT | Performed by: PHYSICIAN ASSISTANT

## 2019-04-04 PROCEDURE — 25810000003 SODIUM CHLORIDE 0.9 % WITH KCL 20 MEQ 20-0.9 MEQ/L-% SOLUTION: Performed by: INTERNAL MEDICINE

## 2019-04-04 PROCEDURE — 25010000002 MORPHINE PER 10 MG: Performed by: INTERNAL MEDICINE

## 2019-04-04 PROCEDURE — 25010000002 ENOXAPARIN PER 10 MG: Performed by: PHYSICIAN ASSISTANT

## 2019-04-04 PROCEDURE — 71045 X-RAY EXAM CHEST 1 VIEW: CPT

## 2019-04-04 PROCEDURE — 85025 COMPLETE CBC W/AUTO DIFF WBC: CPT | Performed by: INTERNAL MEDICINE

## 2019-04-04 PROCEDURE — 63710000001 INSULIN DETEMIR PER 5 UNITS: Performed by: INTERNAL MEDICINE

## 2019-04-04 PROCEDURE — 63710000001 INSULIN LISPRO (HUMAN) PER 5 UNITS: Performed by: INTERNAL MEDICINE

## 2019-04-04 PROCEDURE — 82962 GLUCOSE BLOOD TEST: CPT

## 2019-04-04 PROCEDURE — 76705 ECHO EXAM OF ABDOMEN: CPT

## 2019-04-04 PROCEDURE — 83036 HEMOGLOBIN GLYCOSYLATED A1C: CPT | Performed by: PHYSICIAN ASSISTANT

## 2019-04-04 PROCEDURE — 99233 SBSQ HOSP IP/OBS HIGH 50: CPT | Performed by: INTERNAL MEDICINE

## 2019-04-04 PROCEDURE — 83605 ASSAY OF LACTIC ACID: CPT | Performed by: INTERNAL MEDICINE

## 2019-04-04 PROCEDURE — 80074 ACUTE HEPATITIS PANEL: CPT | Performed by: PHYSICIAN ASSISTANT

## 2019-04-04 RX ORDER — DEXTROSE MONOHYDRATE 25 G/50ML
25-50 INJECTION, SOLUTION INTRAVENOUS
Status: DISCONTINUED | OUTPATIENT
Start: 2019-04-04 | End: 2019-04-05 | Stop reason: HOSPADM

## 2019-04-04 RX ORDER — LISINOPRIL 5 MG/1
2.5 TABLET ORAL DAILY
Status: DISCONTINUED | OUTPATIENT
Start: 2019-04-04 | End: 2019-04-04

## 2019-04-04 RX ORDER — MAGNESIUM SULFATE HEPTAHYDRATE 40 MG/ML
2 INJECTION, SOLUTION INTRAVENOUS AS NEEDED
Status: DISCONTINUED | OUTPATIENT
Start: 2019-04-04 | End: 2019-04-05 | Stop reason: HOSPADM

## 2019-04-04 RX ORDER — FAMOTIDINE 20 MG/1
20 TABLET, FILM COATED ORAL DAILY
Status: DISCONTINUED | OUTPATIENT
Start: 2019-04-04 | End: 2019-04-05 | Stop reason: HOSPADM

## 2019-04-04 RX ORDER — POTASSIUM CHLORIDE 7.46 G/1000ML
10 INJECTION, SOLUTION INTRAVENOUS AS NEEDED
Status: DISCONTINUED | OUTPATIENT
Start: 2019-04-04 | End: 2019-04-05 | Stop reason: HOSPADM

## 2019-04-04 RX ORDER — NICOTINE POLACRILEX 4 MG
15 LOZENGE BUCCAL
Status: DISCONTINUED | OUTPATIENT
Start: 2019-04-04 | End: 2019-04-05 | Stop reason: HOSPADM

## 2019-04-04 RX ORDER — VENLAFAXINE 37.5 MG/1
150 TABLET ORAL 2 TIMES DAILY WITH MEALS
Status: DISCONTINUED | OUTPATIENT
Start: 2019-04-04 | End: 2019-04-04

## 2019-04-04 RX ORDER — DEXTROSE, SODIUM CHLORIDE, AND POTASSIUM CHLORIDE 5; .45; .15 G/100ML; G/100ML; G/100ML
125 INJECTION INTRAVENOUS CONTINUOUS
Status: DISCONTINUED | OUTPATIENT
Start: 2019-04-04 | End: 2019-04-04

## 2019-04-04 RX ORDER — MORPHINE SULFATE 2 MG/ML
2 INJECTION, SOLUTION INTRAMUSCULAR; INTRAVENOUS ONCE
Status: COMPLETED | OUTPATIENT
Start: 2019-04-04 | End: 2019-04-04

## 2019-04-04 RX ORDER — POTASSIUM CHLORIDE 1.5 G/1.77G
40 POWDER, FOR SOLUTION ORAL AS NEEDED
Status: DISCONTINUED | OUTPATIENT
Start: 2019-04-04 | End: 2019-04-05 | Stop reason: HOSPADM

## 2019-04-04 RX ORDER — MAGNESIUM SULFATE HEPTAHYDRATE 40 MG/ML
4 INJECTION, SOLUTION INTRAVENOUS AS NEEDED
Status: DISCONTINUED | OUTPATIENT
Start: 2019-04-04 | End: 2019-04-05 | Stop reason: HOSPADM

## 2019-04-04 RX ORDER — DEXTROSE MONOHYDRATE 25 G/50ML
25 INJECTION, SOLUTION INTRAVENOUS
Status: DISCONTINUED | OUTPATIENT
Start: 2019-04-04 | End: 2019-04-05 | Stop reason: HOSPADM

## 2019-04-04 RX ORDER — ACETAMINOPHEN 325 MG/1
325 TABLET ORAL EVERY 6 HOURS PRN
Status: DISCONTINUED | OUTPATIENT
Start: 2019-04-04 | End: 2019-04-05 | Stop reason: HOSPADM

## 2019-04-04 RX ORDER — CHOLECALCIFEROL (VITAMIN D3) 125 MCG
5 CAPSULE ORAL NIGHTLY PRN
Status: DISCONTINUED | OUTPATIENT
Start: 2019-04-04 | End: 2019-04-05 | Stop reason: HOSPADM

## 2019-04-04 RX ORDER — LEVOTHYROXINE SODIUM 0.15 MG/1
300 TABLET ORAL
Status: DISCONTINUED | OUTPATIENT
Start: 2019-04-04 | End: 2019-04-05 | Stop reason: HOSPADM

## 2019-04-04 RX ORDER — SODIUM CHLORIDE 0.9 % (FLUSH) 0.9 %
3-10 SYRINGE (ML) INJECTION AS NEEDED
Status: DISCONTINUED | OUTPATIENT
Start: 2019-04-04 | End: 2019-04-05 | Stop reason: HOSPADM

## 2019-04-04 RX ORDER — SODIUM CHLORIDE 0.9 % (FLUSH) 0.9 %
3 SYRINGE (ML) INJECTION EVERY 12 HOURS SCHEDULED
Status: DISCONTINUED | OUTPATIENT
Start: 2019-04-04 | End: 2019-04-05 | Stop reason: HOSPADM

## 2019-04-04 RX ORDER — SODIUM CHLORIDE AND POTASSIUM CHLORIDE 150; 900 MG/100ML; MG/100ML
125 INJECTION, SOLUTION INTRAVENOUS CONTINUOUS
Status: DISCONTINUED | OUTPATIENT
Start: 2019-04-04 | End: 2019-04-05 | Stop reason: HOSPADM

## 2019-04-04 RX ORDER — IBUPROFEN 400 MG/1
400 TABLET ORAL EVERY 4 HOURS PRN
Status: DISCONTINUED | OUTPATIENT
Start: 2019-04-04 | End: 2019-04-04

## 2019-04-04 RX ORDER — POTASSIUM CHLORIDE 750 MG/1
40 CAPSULE, EXTENDED RELEASE ORAL AS NEEDED
Status: DISCONTINUED | OUTPATIENT
Start: 2019-04-04 | End: 2019-04-05 | Stop reason: HOSPADM

## 2019-04-04 RX ADMIN — FAMOTIDINE 20 MG: 20 TABLET ORAL at 17:14

## 2019-04-04 RX ADMIN — INSULIN DETEMIR 20 UNITS: 100 INJECTION, SOLUTION SUBCUTANEOUS at 13:22

## 2019-04-04 RX ADMIN — SODIUM CHLORIDE, PRESERVATIVE FREE 3 ML: 5 INJECTION INTRAVENOUS at 08:20

## 2019-04-04 RX ADMIN — SODIUM CHLORIDE 2 UNITS/HR: 9 INJECTION, SOLUTION INTRAVENOUS at 00:19

## 2019-04-04 RX ADMIN — INSULIN LISPRO 4 UNITS: 100 INJECTION, SOLUTION INTRAVENOUS; SUBCUTANEOUS at 20:53

## 2019-04-04 RX ADMIN — IBUPROFEN 400 MG: 400 TABLET ORAL at 06:29

## 2019-04-04 RX ADMIN — ACETAMINOPHEN 325 MG: 325 TABLET, FILM COATED ORAL at 13:21

## 2019-04-04 RX ADMIN — INSULIN LISPRO 4 UNITS: 100 INJECTION, SOLUTION INTRAVENOUS; SUBCUTANEOUS at 17:15

## 2019-04-04 RX ADMIN — SODIUM CHLORIDE, PRESERVATIVE FREE 3 ML: 5 INJECTION INTRAVENOUS at 00:43

## 2019-04-04 RX ADMIN — POTASSIUM CHLORIDE 40 MEQ: 750 CAPSULE, EXTENDED RELEASE ORAL at 08:19

## 2019-04-04 RX ADMIN — POTASSIUM & SODIUM PHOSPHATES POWDER PACK 280-160-250 MG 2 PACKET: 280-160-250 PACK at 17:14

## 2019-04-04 RX ADMIN — MAGNESIUM SULFATE HEPTAHYDRATE 4 G: 40 INJECTION, SOLUTION INTRAVENOUS at 13:22

## 2019-04-04 RX ADMIN — ACETAMINOPHEN 325 MG: 325 TABLET, FILM COATED ORAL at 20:53

## 2019-04-04 RX ADMIN — LEVOTHYROXINE SODIUM 300 MCG: 150 TABLET ORAL at 05:04

## 2019-04-04 RX ADMIN — MORPHINE SULFATE 2 MG: 2 INJECTION, SOLUTION INTRAMUSCULAR; INTRAVENOUS at 01:30

## 2019-04-04 RX ADMIN — SODIUM CHLORIDE 1000 ML: 9 INJECTION, SOLUTION INTRAVENOUS at 00:56

## 2019-04-04 RX ADMIN — ENOXAPARIN SODIUM 40 MG: 100 INJECTION SUBCUTANEOUS at 08:19

## 2019-04-04 RX ADMIN — MELATONIN TAB 5 MG 5 MG: 5 TAB at 20:53

## 2019-04-04 RX ADMIN — POTASSIUM CHLORIDE, DEXTROSE MONOHYDRATE AND SODIUM CHLORIDE 100 ML/HR: 150; 5; 450 INJECTION, SOLUTION INTRAVENOUS at 00:44

## 2019-04-04 RX ADMIN — POTASSIUM CHLORIDE AND SODIUM CHLORIDE 125 ML/HR: 900; 150 INJECTION, SOLUTION INTRAVENOUS at 14:51

## 2019-04-04 RX ADMIN — POTASSIUM CHLORIDE, DEXTROSE MONOHYDRATE AND SODIUM CHLORIDE 100 ML/HR: 150; 5; 450 INJECTION, SOLUTION INTRAVENOUS at 08:24

## 2019-04-04 NOTE — PAYOR COMM NOTE
"Aliza Rivera RN    Phone [phone]  Fax [phone]    Marisol Adorno (33 y.o. Female)     Date of Birth Social Security Number Address [place] Phone MRN    1985  [id number] [facility] [address] [phone] [id number]    Orthodox Marital Status          Mandaeism Single       Admission Date Admission Type Admitting Provider Attending Provider Department, Room/Bed    4/3/19 Emergency Marcos Burrows MD Sloan, Walker E, MD [facility] 5G, S560/1    Discharge Date Discharge Disposition Discharge Destination                       Attending Provider:  Marcos Burrows MD    Allergies:  Sulfa Antibiotics, Peanut-containing Drug Products, Shellfish-derived Products, Dairy Aid [Lactase], Eggs Or Egg-derived Products, Soybean-containing Drug Products, Wheat Extract    Isolation:  None   Infection:  None   Code Status:  CPR    Ht:  172.7 cm (68\")   Wt:  74.8 kg (165 lb)    Admission Cmt:  None   Principal Problem:  DKA (diabetic ketoacidoses) (CMS/[facility]) [E13.10]                 Active Insurance as of 4/3/2019     Primary Coverage     Payor Plan Insurance Group Employer/Plan Group    PASSPORT PASSPORT MEDICAID     Payor Plan Address Payor Plan Phone Number Payor Plan Fax Number Effective Dates    [address] [phone]  1997 - None Entered    [facility] [id number]       Subscriber Name Subscriber Birth Date Member ID       MARISOL ADORNO 1985 [id number]                 Emergency Contacts      (Rel.) Home Phone Work Phone Mobile Phone    Ayah Stiles (Mother) -- -- [phone]               History & Physical      Bety Dominguez DO at 4/3/2019  9:32 PM              [facility] Medicine Services  HISTORY AND PHYSICAL    Patient Name: Marisol Adorno  : 1985  MRN: [id number]  Primary Care Physician: Provider, No Known  Date of admission: 4/3/2019      Subjective   Subjective     Chief Complaint: Stomach cramps x 2 " days    HPI:  Marisol Cuellar is a 33 y.o. female with medical history significant for Type 1 DM with insulin pump and Hypothyroidism presented to [facility] ED after running out of insulin pump supplies and test strips on Tuesday morning 4/2/19.  She reports onset of dizziness, stomach cramps, and leg pain Tuesday afternoon.  She states she was unable to get to her mother's house for supplies because she felt so sick.  Today she took 10 units of NovoLog insulin from a family member, and took another 12 units of NovoLog at her mother's house by 38 units of Lantus.  She also endorses some SOB but denies fever, chills, cough, congestion. She also endorses decrease urine output with no other urinary symptoms.  No chest pain, palpitations, nausea, vomiting, diarrhea or headache. She denies history of DKA in the past.  Patient denies use of tobacco, alcohol or drugs.  Per record, patient has history of IV drug abuse.  Patient lives with a roommate.    In the emergency department, hypotensive, current labs include glucose 183, potassium 4.1, anion gap 19, creatinine 1.41, alk phos 111, ALT 89, AST 65, PHQ 6.  UA revealed large amounts of glucose, ketones, protein. Negative HCG. Patient given 1.5mg Dilaudid, Zofran and 2L of NS in the ED. The patient will be admitted to the hospitalist service for further medical management.    Review of Systems   Constitutional: Negative for chills and fever.   HENT: Negative for congestion and rhinorrhea.    Eyes: Negative for pain and visual disturbance.   Respiratory: Positive for shortness of breath. Negative for cough and wheezing.    Cardiovascular: Negative for chest pain, palpitations and leg swelling.   Gastrointestinal: Positive for abdominal pain (cramping). Negative for diarrhea, nausea and vomiting.   Endocrine: Positive for polydipsia. Negative for cold intolerance, heat intolerance and polyuria.   Genitourinary: Positive for decreased urine volume. Negative for difficulty  urinating and dysuria.   Musculoskeletal: Negative for back pain and joint swelling.   Skin: Negative for rash and wound.   Neurological: Positive for dizziness. Negative for syncope, weakness and headaches.   Hematological: Negative for adenopathy. Does not bruise/bleed easily.   Psychiatric/Behavioral: Negative for agitation and confusion.      Otherwise complete ROS reviewed and is negative except as mentioned in the HPI.    Personal History     Past Medical History:   Diagnosis Date   • Anxiety    • Chronic low back pain    • Depression    • Diabetes mellitus (CMS/HCC)     TYPE 1   • Graves disease    • Herniated lumbar intervertebral disc    • History of Papanicolaou smear of cervix 2016?    old PCP. Dr. Lee   • IV drug abuse (CMS/HCC)        Past Surgical History:   Procedure Laterality Date   •  SECTION       and    • ENDOMETRIAL ABLATION     • FOOT SURGERY      METAL DINA PLACED IN FOOT   • SINUS SURGERY      ,    • TONSILLECTOMY         Family History: family history includes Congenital heart disease in her son; Diabetes type I in her father; Diabetes type II in her brother; Fibromyalgia in her mother; Heart attack (age of onset: 57) in her father; Macular degeneration in her mother. Otherwise pertinent FHx was reviewed and unremarkable.     Social History:  reports that she has quit smoking. She has never used smokeless tobacco. She reports that she does not drink alcohol or use drugs.  Social History     Social History Narrative    Live with boyfriend and son        Medications:    Available home medication information reviewed.  Medications Prior to Admission   Medication Sig Dispense Refill Last Dose   • B-D ULTRAFINE III SHORT PEN 31G X 8 MM misc USE UTD WITH TRESIBA  3 Taking   • buPROPion SR (WELLBUTRIN SR) 200 MG 12 hr tablet 1 po qd   Taking   • cetirizine (zyrTEC) 10 MG tablet Take 10 mg by mouth Every Night.   Taking   • clonazePAM (KlonoPIN) 1 MG tablet Take  1 mg by mouth 2 (Two) Times a Day As Needed for Seizures.   Taking   • fluticasone (FLONASE) 50 MCG/ACT nasal spray 2 sprays into each nostril Daily. 1 bottle 3 Not Taking   • glucose blood (ARUN CONTOUR TEST) test strip Use  each 11    • Insulin Degludec (TRESIBA FLEXTOUCH) 200 UNIT/ML solution pen-injector Inject 35 Units under the skin into the appropriate area as directed every night at bedtime. 2 pen 3    • insulin lispro (HUMALOG) 100 UNIT/ML injection Take up to total daily dose 50U daily 20 mL 11    • lamoTRIgine (LAMICTAL) 150 MG tablet Take 150 mg by mouth Daily.   Taking   • levothyroxine (SYNTHROID, LEVOTHROID) 300 MCG tablet Take 1 tablet by mouth Daily. 30 tablet 6    • lisinopril (ZESTRIL) 2.5 MG tablet Take 1 tablet by mouth Daily. 30 tablet 6    • venlafaxine (EFFEXOR) 75 MG tablet Take 150 mg by mouth 2 (Two) Times a Day.   Taking       Allergies   Allergen Reactions   • Sulfa Antibiotics Anaphylaxis   • Peanut-Containing Drug Products Other (See Comments)     Coughing and tightness in chest   • Shellfish-Derived Products Other (See Comments)     Itchy throat and tightness   • Dairy Aid [Lactase] Rash     migraines   • Eggs Or Egg-Derived Products Rash     migraines   • Soybean-Containing Drug Products Rash     migraines   • Wheat Extract Rash     migraines       Objective   Objective     Vital Signs:   Temp:  [98.1 °F (36.7 °C)] 98.1 °F (36.7 °C)  Heart Rate:  [] 78  Resp:  [16-18] 16  BP: ()/(49-68) 94/64        Physical Exam   Constitutional: Awake, alert, lying curled up in bed holding stomach  Eyes: PERRLA, sclerae anicteric, no conjunctival injection  HENT: NCAT, mucous membranes dry  Neck: Supple, no thyromegaly, no lymphadenopathy, trachea midline  Respiratory: Clear to auscultation bilaterally, nonlabored respirations   Cardiovascular: RRR, no murmurs appreciated, palpable pedal pulses bilaterally  Gastrointestinal: Positive bowel sounds, soft, mild diffuse tenderness,  no distention, no peritoneal signs  Musculoskeletal: No bilateral ankle edema, no clubbing or cyanosis to extremities  Psychiatric: Appropriate affect, cooperative  Neurologic: Oriented x 3, strength symmetric in all extremities, Cranial Nerves grossly intact to confrontation, speech clear  Skin: No rashes    Results Reviewed:  I have personally reviewed current lab, radiology, and data and agree.    Results from last 7 days   Lab Units 04/03/19 2050 04/03/19  1532   WBC 10*3/mm3  --  9.38   HEMOGLOBIN g/dL  --  15.1   HEMOGLOBIN, POC g/dL 13.6  --    HEMATOCRIT %  --  44.3*   HEMATOCRIT POC % 40  --    PLATELETS 10*3/mm3  --  495*     Results from last 7 days   Lab Units 04/03/19 2050 04/03/19  1532   SODIUM mmol/L  --  129*   POTASSIUM mmol/L  --  4.6   CHLORIDE mmol/L  --  93*   CO2 mmol/L  --  17.0*   BUN mg/dL  --  29*   CREATININE mg/dL 0.70 1.41*   GLUCOSE mg/dL  --  334*   CALCIUM mg/dL  --  9.0   ALT (SGPT) U/L  --  89*   AST (SGOT) U/L  --  65*     Estimated Creatinine Clearance: 132.5 mL/min (by C-G formula based on SCr of 0.7 mg/dL).  Brief Urine Lab Results  (Last result in the past 365 days)      Color   Clarity   Blood   Leuk Est   Nitrite   Protein   CREAT   Urine HCG        04/03/19 1601               Negative         No results found for: BNP  Imaging Results (last 24 hours)     Procedure Component Value Units Date/Time    CT Abdomen Pelvis Without Contrast [[id number]] Collected:  04/03/19 1942     Updated:  04/03/19 1945    Narrative:       CT Abdomen Pelvis WO    INDICATION:   Nausea and fatigue, chills and abdominal cramping    TECHNIQUE:   CT of the abdomen and pelvis without contrast. Coronal and sagittal reconstructions were obtained.  Radiation dose reduction techniques included automated exposure control or exposure modulation based on body size. Radiation audit for number of CT and  nuclear cardiology exams performed in the last year: 0.      COMPARISON:   None  available.    FINDINGS:    Visualized lung bases are unremarkable.    Abdomen: The liver and gallbladder are normal. The spleen and pancreas and kidneys and adrenal glands are normal. The aorta is normal in caliber. There is no bowel obstruction, or abdominal or retroperitoneal mass or adenopathy.    Pelvis:  The appendix is normal. There is no pelvic mass, adenopathy, inflammatory change or abnormal fluid collection. There is no hernia or bowel obstruction.    There are some spinal degenerative change but no acute bony abnormality.      Impression:       No acute abnormality. No renal or bowel or biliary obstruction. Normal appendix.      Signer Name: Ronald Benson MD   Signed: 4/3/2019 7:42 PM   Workstation Name: [email or web address]                Assessment/Plan   Assessment / Plan     Active Hospital Problems    Diagnosis POA   • **DKA (diabetic ketoacidoses) (CMS/HCC) [E13.10] Unknown   • Elevated transaminase level [R74.0] Unknown   • Acute kidney injury (CMS/HCC) [N17.9] Yes   • Anxiety and depression [F41.9, F32.9] Yes   • hx of Graves disease, now hypothyroid [E05.00] Yes     Diabetic Ketoacidosis, Anion Gap 19  Type 1 Diabetes Mellitus, Insulin Pump   -Glucose trending down, currently 183  -Given 2L of NS in ED  -Plan to give 1L of NS followed by 0.45 NS + D5W 100 mL/hr + Potassium 30 mEq until gap is closed   -Current potassium 4.1, continue to monitor  -Insulin drip  -BMP every 4 hours  -Lactic acid  -Influenza PCR and Ag  -CXR   -Hemoglobin A1c  -Pain control  -Diabetes educator  -CM consult    Elevated Liver Enzymes  -Liver ultrasound   -Recheck CMP in AM    Acute Kidney Injury  -Creatinine 1.41  -Recheck BMP   -Likely due to dehydration  -Continue fluids  -Avoid nephrotoxins, holding home lisinopril    Hypothyroidism   -Continue Synthroid  -Check TSH in a.m.    Anxiety/Depression  -Continue home meds    Thrombocytosis  -Check a.m. labs  -Likely inflammatory marker from current illness  -Consider  outpatient follow-up with hematology    DVT prophylaxis: Lovenox     CODE STATUS:    Code Status and Medical Interventions:   Ordered at: 04/03/19 0896     Level Of Support Discussed With:    Patient     Code Status:    CPR     Medical Interventions (Level of Support Prior to Arrest):    Full     Admission Status:  I believe this patient meets INPATIENT status due to the need for care which can only be reasonably provided in an hospital setting such as possible need for aggressive/expedited ancillary services and/or consultation services, IV medications, close physician monitoring, and/or procedures.  In such, I feel patient’s risk for adverse outcomes and need for care warrant INPATIENT evaluation and predict the patient’s care encounter to likely last beyond 2 midnights.    Electronically signed by Claudia Lucas PA-C, 04/03/19, 9:32 PM.      Brief Attending Admission Attestation     I have seen and examined the patient, performing an independent face-to-face diagnostic evaluation with plan of care reviewed and developed with the advanced practice clinician (APC).      Brief Summary Statement:   Marisol Cuellar is a 33 y.o. female the past medical history significant for Graves' disease, type 1 diabetes mellitus on an insulin pump who presents to the ED due to abdominal pain.  Patient states that she ran out of insulin yesterday morning.  Shortly thereafter she reports onset of abdominal pain, cramps and nausea.  Her symptoms persisted today and she was able to go to a relatives house and took 12 units Humalog followed by 10 units Humalog followed by 30 units of Lantus.  Patient denies dysuria, vomiting, fever, cough.  She continues to have severe leg cramps.  Remainder of detailed HPI is as noted above and has been reviewed and/or edited by me for completeness.      Attending Physical Exam:  Constitutional: Awake, alert, ill-appearing  Eyes: PERRLA, sclerae anicteric, no conjunctival injection  HENT: NCAT,  mucous membranes dry  Neck: Supple, no thyromegaly, no lymphadenopathy, trachea midline  Respiratory: Clear to auscultation bilaterally, nonlabored respirations   Cardiovascular: RRR, no murmurs, rubs, or gallops, palpable pedal pulses bilaterally  Gastrointestinal: Positive bowel sounds, soft, nontender, nondistended  Musculoskeletal: No bilateral ankle edema, no clubbing or cyanosis to extremities  Psychiatric: Appropriate affect, cooperative  Neurologic: Oriented x 3, strength symmetric in all extremities, Cranial Nerves grossly intact to confrontation, speech clear  Skin: No rashes        Brief Assessment/Plan :  See above for further detailed assessment and plan developed with APC which I have reviewed and/or edited for completeness.      Electronically signed by Bety Dominguez DO, 04/04/19, 12:32 AM.           Electronically signed by Bety Dominguez DO at 4/4/2019 12:45 AM          Emergency Department Notes      Daniel Gale MD at 4/3/2019  4:14 PM      Procedure Orders    1. Critical Care [[id number]] ordered by Daniel Gale MD at 04/03/19 2130                Subjective   Marisol Cuellar is a 33 y.o. female who presents to the ED with complaints of hyperglycemia. The patient has diabetes mellitus and reports that she ran out of test strips to check her blood sugar last night and ran out of supplies for her insulin pump 3 days ago. She reports that her blood sugar level was 500 last night and was 397 when she checked at her mom's house earlier today. She states that she has been experiencing chills, nausea, fatigue, dizziness, palpitations, abdominal cramping, and arm and leg pain since last night. She also reports that she has ketones in her urine. She denies fever, vomiting, diarrhea, cough, rhinorrhea, and chest pain. She states an hour and a half ago she administered an insulin shot. There are no other acute complaints at this time.        History provided by:  Patient  Hyperglycemia   Blood  sugar level PTA:  397  Severity:  Moderate  Onset quality:  Sudden  Duration:  1 day  Diabetes status:  Controlled with insulin  Associated symptoms: abdominal pain, dizziness, fatigue and nausea    Associated symptoms: no chest pain, no fever and no vomiting        Review of Systems   Constitutional: Positive for chills and fatigue. Negative for fever.   HENT: Negative for rhinorrhea.    Respiratory: Negative for cough.    Cardiovascular: Positive for palpitations. Negative for chest pain.   Gastrointestinal: Positive for abdominal pain and nausea. Negative for diarrhea and vomiting.   Musculoskeletal: Positive for arthralgias (arm and leg pain).   Neurological: Positive for dizziness.   All other systems reviewed and are negative.      Past Medical History:   Diagnosis Date   • Anxiety    • Chronic low back pain    • Depression    • Diabetes mellitus (CMS/[facility])     TYPE 1   • Graves disease    • Herniated lumbar intervertebral disc    • History of Papanicolaou smear of cervix 2016?    old PCP. Dr. Lee   • IV drug abuse (CMS/[facility])        Allergies   Allergen Reactions   • Sulfa Antibiotics Anaphylaxis   • Peanut-Containing Drug Products Other (See Comments)     Coughing and tightness in chest   • Shellfish-Derived Products Other (See Comments)     Itchy throat and tightness   • Dairy Aid [Lactase] Rash     migraines   • Eggs Or Egg-Derived Products Rash     migraines   • Soybean-Containing Drug Products Rash     migraines   • Wheat Extract Rash     migraines       Past Surgical History:   Procedure Laterality Date   •  SECTION       and    • ENDOMETRIAL ABLATION     • FOOT SURGERY      METAL DINA PLACED IN FOOT   • SINUS SURGERY      ,    • TONSILLECTOMY         Family History   Problem Relation Age of Onset   • Heart attack Father 57   • Diabetes type I Father    • Macular degeneration Mother    • Fibromyalgia Mother    • Diabetes type II Brother    • Congenital heart disease Son          hypertrophic cardiomyopathy       Social History     Socioeconomic History   • Marital status: Single     Spouse name: Not on file   • Number of children: Not on file   • Years of education: Not on file   • Highest education level: Not on file   Tobacco Use   • Smoking status: Former Smoker   • Smokeless tobacco: Never Used   • Tobacco comment: QUIT 11/2017   Substance and Sexual Activity   • Alcohol use: No   • Drug use: No     Comment: In recovery. 90 days now. IV drug use and Marijuana   • Sexual activity: Defer     Comment:    Social History Narrative    Live with boyfriend and son          Objective   Physical Exam   Constitutional: She is oriented to person, place, and time. She appears well-developed and well-nourished. No distress.   HENT:   Head: Normocephalic and atraumatic.   Pharynx benign. Airway patent.    Eyes: Conjunctivae are normal. No scleral icterus.   Neck: Normal range of motion. Neck supple.   Cardiovascular: Normal rate, regular rhythm and normal heart sounds.   Pulmonary/Chest: Effort normal and breath sounds normal.   Abdominal: Soft. There is tenderness.   Tenderness of mid and upper abdomen.    Musculoskeletal: Normal range of motion.   Lymphadenopathy:     She has no cervical adenopathy.   Neurological: She is alert and oriented to person, place, and time.   Skin: Skin is warm and dry.   Psychiatric: She has a normal mood and affect. Her behavior is normal.   Nursing note and vitals reviewed.      Critical Care  Performed by: Daniel Gale MD  Authorized by: Daniel Gale MD     Critical care provider statement:     Critical care time (minutes):  45    Critical care time was exclusive of:  Separately billable procedures and treating other patients and teaching time    Critical care was necessary to treat or prevent imminent or life-threatening deterioration of the following conditions:  Metabolic crisis    Critical care was time spent personally by me on the  following activities:  Development of treatment plan with patient or surrogate, discussions with consultants, evaluation of patient's response to treatment, examination of patient, obtaining history from patient or surrogate, review of old charts, re-evaluation of patient's condition, pulse oximetry, ordering and review of radiographic studies, ordering and review of laboratory studies and ordering and performing treatments and interventions  Comments:      Critical situation is DKA.  She had already taken insulin, so interventions were symptomatic care with antinauseant and pain medication.  Significant fluid resuscitation for her DKA and for prolonged hypotension.  Multiple rechecks with observation of modest improvement in her pain and sense of well-being.  Good drop in blood sugars.  Normalizing of BP.  However, still acidotic, so admitted.              ED Course  ED Course as of Apr 03 2129 Wed Apr 03, 2019   1849 The patient is still hypotensive and more fluids will be administered.   [JE]   1905 Dr. Gale reevaluted the patient and she is still experiencing a lot abdominal pain and persistent hypotension.   [JE]   2013 Dr. Gale reevaluted the patient and she states that her legs and abdomen are still in pain.   [JE]      ED Course User Index  [JE] Gladys Patel     Recent Results (from the past 24 hour(s))   POC Glucose Once    Collection Time: 04/03/19  3:30 PM   Result Value Ref Range    Glucose 324 (H) 70 - 130 mg/dL   Comprehensive Metabolic Panel    Collection Time: 04/03/19  3:32 PM   Result Value Ref Range    Glucose 334 (H) 70 - 100 mg/dL    BUN 29 (H) 9 - 23 mg/dL    Creatinine 1.41 (H) 0.60 - 1.30 mg/dL    Sodium 129 (L) 132 - 146 mmol/L    Potassium 4.6 3.5 - 5.5 mmol/L    Chloride 93 (L) 99 - 109 mmol/L    CO2 17.0 (L) 20.0 - 31.0 mmol/L    Calcium 9.0 8.7 - 10.4 mg/dL    Total Protein 6.7 5.7 - 8.2 g/dL    Albumin 4.29 3.20 - 4.80 g/dL    ALT (SGPT) 89 (H) 7 - 40 U/L    AST (SGOT) 65  (H) 0 - 33 U/L    Alkaline Phosphatase 111 (H) 25 - 100 U/L    Total Bilirubin 0.6 0.3 - 1.2 mg/dL    eGFR Non African Amer 42 (L) >60 mL/min/1.73    Globulin 2.4 gm/dL    A/G Ratio 1.8 1.5 - 2.5 g/dL    BUN/Creatinine Ratio 20.6 7.0 - 25.0    Anion Gap 19.0 (H) 3.0 - 11.0 mmol/L   CBC Auto Differential    Collection Time: 04/03/19  3:32 PM   Result Value Ref Range    WBC 9.38 3.50 - 10.80 10*3/mm3    RBC 4.73 3.89 - 5.14 10*6/mm3    Hemoglobin 15.1 11.5 - 15.5 g/dL    Hematocrit 44.3 (H) 34.5 - 44.0 %    MCV 93.7 80.0 - 99.0 fL    MCH 31.9 (H) 27.0 - 31.0 pg    MCHC 34.1 32.0 - 36.0 g/dL    RDW 13.0 11.3 - 14.5 %    RDW-SD 44.5 37.0 - 54.0 fl    MPV 9.8 6.0 - 12.0 fL    Platelets 495 (H) 150 - 450 10*3/mm3    Neutrophil % 77.2 (H) 41.0 - 71.0 %    Lymphocyte % 18.3 (L) 24.0 - 44.0 %    Monocyte % 3.5 0.0 - 12.0 %    Eosinophil % 0.5 0.0 - 3.0 %    Basophil % 0.5 0.0 - 1.0 %    Immature Grans % 0.2 0.0 - 0.6 %    Neutrophils, Absolute 7.23 1.50 - 8.30 10*3/mm3    Lymphocytes, Absolute 1.72 0.60 - 4.80 10*3/mm3    Monocytes, Absolute 0.33 0.00 - 1.00 10*3/mm3    Eosinophils, Absolute 0.05 0.00 - 0.30 10*3/mm3    Basophils, Absolute 0.05 0.00 - 0.20 10*3/mm3    Immature Grans, Absolute 0.02 0.00 - 0.05 10*3/mm3   Beta Hydroxybutyrate Quantitative    Collection Time: 04/03/19  3:32 PM   Result Value Ref Range    Beta-Hydroxybutyrate Quant 6.000 (H) <=0.500 mmol/L   Urinalysis With Microscopic If Indicated (No Culture) - Urine, Clean Catch    Collection Time: 04/03/19  3:35 PM   Result Value Ref Range    Color, UA Yellow Yellow, Straw    Appearance, UA Clear Clear    pH, UA <=5.0 5.0 - 8.0    Specific Gravity, UA 1.023 1.001 - 1.030    Glucose, UA >=1000 mg/dL (3+) (A) Negative    Ketones, UA >=160 mg/dL (4+) (A) Negative    Bilirubin, UA Negative Negative    Blood, UA Negative Negative    Protein, UA 30 mg/dL (1+) (A) Negative    Leuk Esterase, UA Negative Negative    Nitrite, UA Negative Negative    Urobilinogen, UA  0.2 E.U./dL 0.2 - 1.0 E.U./dL   Urinalysis, Microscopic Only - Urine, Clean Catch    Collection Time: 04/03/19  3:35 PM   Result Value Ref Range    RBC, UA 0-2 None Seen, 0-2 /HPF    WBC, UA 0-2 None Seen, 0-2 /HPF    Bacteria, UA None Seen None Seen, Trace /HPF    Squamous Epithelial Cells, UA 0-2 None Seen, 0-2 /HPF    Hyaline Casts, UA 0-6 0 - 6 /LPF    Methodology Automated Microscopy    POCT pregnancy, urine    Collection Time: 04/03/19  4:01 PM   Result Value Ref Range    HCG, Urine, QL Negative Negative    Lot Number [id number]     Internal Positive Control Presumptive Positive     Internal Negative Control Presumptive Negative    POC Glucose Once    Collection Time: 04/03/19  4:57 PM   Result Value Ref Range    Glucose 267 (H) 70 - 130 mg/dL   POC Glucose Once    Collection Time: 04/03/19  6:39 PM   Result Value Ref Range    Glucose 214 (H) 70 - 130 mg/dL   POC Glucose Once    Collection Time: 04/03/19  7:12 PM   Result Value Ref Range    Glucose 203 (H) 70 - 130 mg/dL   POC CHEM 8    Collection Time: 04/03/19  8:50 PM   Result Value Ref Range    Glucose 206 (H) 70 - 130 mg/dL    BUN 27 (H) 8 - 26 mg/dL    Creatinine 0.70 0.60 - 1.30 mg/dL    Sodium 136 (L) 138 - 146 mmol/L    Potassium 4.1 3.5 - 4.9 mmol/L    Chloride 100 98 - 109 mmol/L    Total CO2 19 (L) 24 - 29 mmol/L    Hemoglobin 13.6 12.0 - 17.0 g/dL    Hematocrit 40 38 - 51 %    Ionized Calcium 1.11 (L) 1.20 - 1.32 mmol/L     Note: In addition to lab results from this visit, the labs listed above may include labs taken at another facility or during a different encounter within the last 24 hours. Please correlate lab times with ED admission and discharge times for further clarification of the services performed during this visit.    CT Abdomen Pelvis Without Contrast   Final Result   No acute abnormality. No renal or bowel or biliary obstruction. Normal appendix.         Signer Name: Ronald Benson MD    Signed: 4/3/2019 7:42 PM    Workstation Name:  [id number]            Vitals:    04/03/19 1730 04/03/19 1830 04/03/19 2000 04/03/19 2051   BP: 94/57 93/59 109/68 97/49   Pulse: 82 84 86 81   Resp: 18 16 16 16   Temp:       SpO2: 95% 95% 97% 98%   Weight:       Height:         Medications   sodium chloride 0.9 % flush 10 mL (not administered)   sodium chloride 0.9 % infusion (not administered)   sodium chloride 0.9 % bolus 1,000 mL (0 mL Intravenous Stopped 4/3/19 1741)   ondansetron (ZOFRAN) injection 4 mg (4 mg Intravenous Given 4/3/19 1638)   HYDROmorphone (DILAUDID) injection 0.5 mg (0.5 mg Intravenous Given 4/3/19 1638)   sodium chloride 0.9 % bolus 1,000 mL (1,000 mL Intravenous New Bag 4/3/19 1854)   ondansetron (ZOFRAN) injection 4 mg (4 mg Intravenous Given 4/3/19 1904)   HYDROmorphone (DILAUDID) injection 0.5 mg (0.5 mg Intravenous Given 4/3/19 1910)   HYDROmorphone (DILAUDID) injection 0.5 mg (0.5 mg Intravenous Given 4/3/19 2048)     ECG/EMG Results (last 24 hours)     ** No results found for the last 24 hours. **        No orders to display                       MDM    Final diagnoses:   Diabetic ketoacidosis without coma associated with other specified diabetes mellitus (CMS/HCC)   Abdominal pain, unspecified abdominal location       Documentation assistance provided by edwin Patel.  Information recorded by the edwin was done at my direction and has been verified and validated by me.     Gladys Patel  04/03/19 1716       Gladys Patel  04/03/19 1730       Daniel Gale MD  04/03/19 2138      Electronically signed by Daniel Gale MD at 4/3/2019  9:38 PM       ICU Vital Signs     Row Name 04/04/19 0814 04/04/19 0308 04/04/19 0038 04/03/19 2300 04/03/19 2200       Vitals    Temp  98.5 °F (36.9 °C)  98.4 °F (36.9 °C)  98.1 °F (36.7 °C)  --  --    Temp src  Oral  Oral  Oral  --  --    Pulse  --  74  78  78  95    Heart Rate Source  Monitor  Monitor  Monitor  --  --    Resp  16  16  14  --  16    Resp Rate Source  " Visual  Visual  Visual  --  --    BP  102/65  99/65  109/69  94/64  97/55    Noninvasive MAP (mmHg)  --  --  --  71  67    BP Location  Left arm  Left arm  Left arm  --  --    BP Method  Automatic  Automatic  Automatic  --  --    Patient Position  Lying  Lying  Lying  --  --       Oxygen Therapy    SpO2  --  95 %  97 %  96 %  97 %    Row Name 04/03/19 2130 04/03/19 2051 04/03/19 2000 04/03/19 1830 04/03/19 1730       Vitals    Pulse  88  81  86  84  82    Resp  16  16  16  16  18    BP  90/50  97/49  109/68  93/59  94/57    Noninvasive MAP (mmHg)  72  63  81  69  71       Oxygen Therapy    SpO2  95 %  98 %  97 %  95 %  95 %    Row Name 04/03/19 1630 04/03/19 1528 04/03/19 1519             Height and Weight    Height  --  172.7 cm (68\")  --      Height Method  --  Stated  --      Weight  --  74.8 kg (165 lb)  --      Weight Method  --  Stated  --      Ideal Body Weight (IBW) (kg)  --  64.15  --      BSA (Calculated - sq m)  --  1.88 sq meters  --      BMI (Calculated)  --  25.1  --      Weight in (lb) to have BMI = 25  --  164.1  --         Vitals    Temp  --  --  98.1 °F (36.7 °C)      Pulse  86  --  101      Resp  18  --  18      BP  96/61  --  101/57      Noninvasive MAP (mmHg)  71  --  --         Oxygen Therapy    SpO2  98 %  --  100 %          Hospital Medications (all)       Dose Frequency Start End    acetaminophen (TYLENOL) tablet 325 mg 325 mg Every 6 Hours PRN 4/4/2019     Sig - Route: Take 1 tablet by mouth Every 6 (Six) Hours As Needed for Mild Pain . - Oral    dextrose (D50W) 25 g/ 50mL Intravenous Solution 25-50 mL 25-50 mL Every 30 Minutes PRN 4/4/2019     Sig - Route: Infuse 25-50 mL into a venous catheter Every 30 (Thirty) Minutes As Needed for Low Blood Sugar (Blood Glucose <70 mg/dL; Per Insulin Infusion Protocol). - Intravenous    dextrose 5 % and sodium chloride 0.45 % with KCl 20 mEq/L infusion 150 mL/hr Continuous 4/4/2019     Sig - Route: Infuse 150 mL/hr into a venous catheter Continuous. - " "Intravenous    Notes to Pharmacy: Increase potassium to 30 mEq/L please. Thank you!    enoxaparin (LOVENOX) syringe 40 mg 40 mg Every 24 Hours 4/4/2019     Sig - Route: Inject 0.4 mL under the skin into the appropriate area as directed Daily. - Subcutaneous    HYDROmorphone (DILAUDID) injection 0.5 mg 0.5 mg Once 4/3/2019 4/3/2019    Sig - Route: Infuse 0.5 mL into a venous catheter 1 (One) Time. - Intravenous    HYDROmorphone (DILAUDID) injection 0.5 mg 0.5 mg Once 4/3/2019 4/3/2019    Sig - Route: Infuse 0.5 mL into a venous catheter 1 (One) Time. - Intravenous    HYDROmorphone (DILAUDID) injection 0.5 mg 0.5 mg Once 4/3/2019 4/3/2019    Sig - Route: Infuse 0.5 mL into a venous catheter 1 (One) Time. - Intravenous    insulin regular (HumuLIN R,NovoLIN R) 100 Units in sodium chloride 0.9 % 100 mL (1 Units/mL) infusion 1-20 Units/hr Titrated 4/3/2019     Sig - Route: Infuse 1-20 Units/hr into a venous catheter Dose Adjusted By Provider As Needed. - Intravenous    levothyroxine (SYNTHROID, LEVOTHROID) tablet 300 mcg 300 mcg Every Early Morning 4/4/2019     Sig - Route: Take 2 tablets by mouth Every Morning. - Oral    Magnesium Sulfate 2 gram / 50mL Infusion (GIVE X 3 BAGS TO EQUAL 6GM TOTAL DOSE) - Mg 1.1 - 1.5 mg/dl 2 g As Needed 4/4/2019     Sig - Route: Infuse 50 mL into a venous catheter As Needed (See Administration Instructions). - Intravenous    Linked Group 1:  \"Or\" Linked Group Details        Magnesium Sulfate 2 gram Bolus, followed by 8 gram infusion (total Mg dose 10 grams)- Mg less than or equal to 1mg/dL 2 g As Needed 4/4/2019     Sig - Route: Infuse 50 mL into a venous catheter As Needed (See Administration Instructions). - Intravenous    Linked Group 1:  \"Or\" Linked Group Details        Magnesium Sulfate 4 gram infusion- Mg 1.6-1.9 mg/dL 4 g As Needed 4/4/2019     Sig - Route: Infuse 100 mL into a venous catheter As Needed (See Administration Instructions). - Intravenous    Linked Group 1:  \"Or\" Linked " "Group Details        morphine injection 2 mg 2 mg Once 4/4/2019 4/4/2019    Sig - Route: Infuse 1 mL into a venous catheter 1 (One) Time. - Intravenous    ondansetron (ZOFRAN) injection 4 mg 4 mg Once 4/3/2019 4/3/2019    Sig - Route: Infuse 2 mL into a venous catheter 1 (One) Time. - Intravenous    ondansetron (ZOFRAN) injection 4 mg 4 mg Once 4/3/2019 4/3/2019    Sig - Route: Infuse 2 mL into a venous catheter 1 (One) Time. - Intravenous    potassium & sodium phosphates (PHOS-NAK) 280-160-250 MG packet - for Phosphorus 1.25 - 2.5 mg/dL 2 packet Once As Needed 4/4/2019     Sig - Route: Take 2 packets by mouth 1 (One) Time As Needed (Phosphorus 1.25 - 2.5 mg/dL). - Oral    Linked Group 2:  \"Or\" Linked Group Details        potassium & sodium phosphates (PHOS-NAK) 280-160-250 MG packet - for Phosphorus less than 1.25 mg/dL 2 packet Every 6 Hours PRN 4/4/2019     Sig - Route: Take 2 packets by mouth Every 6 (Six) Hours As Needed (Phosphorus less than 1.25 mg/dL). - Oral    Linked Group 2:  \"Or\" Linked Group Details        potassium chloride (KLOR-CON) packet 40 mEq 40 mEq As Needed 4/4/2019     Sig - Route: Take 40 mEq by mouth As Needed (Potassium replacement per admin instructions). - Oral    Linked Group 3:  \"Or\" Linked Group Details        potassium chloride (MICRO-K) CR capsule 40 mEq 40 mEq As Needed 4/4/2019     Sig - Route: Take 4 capsules by mouth As Needed (Potassium replacement per admin instructions). - Oral    Linked Group 3:  \"Or\" Linked Group Details        potassium chloride 10 mEq in 100 mL IVPB 10 mEq As Needed 4/4/2019     Sig - Route: Infuse 100 mL into a venous catheter As Needed (Potassium replacement per admin instructions). - Intravenous    Linked Group 3:  \"Or\" Linked Group Details        sodium chloride 0.9 % bolus 1,000 mL 1,000 mL Once 4/3/2019 4/3/2019    Sig - Route: Infuse 1,000 mL into a venous catheter 1 (One) Time. - Intravenous    sodium chloride 0.9 % bolus 1,000 mL 1,000 mL Once " 4/3/2019 4/3/2019    Sig - Route: Infuse 1,000 mL into a venous catheter 1 (One) Time. - Intravenous    sodium chloride 0.9 % bolus 1,000 mL 1,000 mL Once 4/4/2019 4/4/2019    Sig - Route: Infuse 1,000 mL into a venous catheter 1 (One) Time. - Intravenous    sodium chloride 0.9 % flush 10 mL 10 mL As Needed 4/3/2019     Sig - Route: Infuse 10 mL into a venous catheter As Needed for Line Care. - Intravenous    Cosign for Ordering: Accepted by Daniel Gale MD on 4/3/2019  4:21 PM    sodium chloride 0.9 % flush 3 mL 3 mL Every 12 Hours Scheduled 4/4/2019     Sig - Route: Infuse 3 mL into a venous catheter Every 12 (Twelve) Hours. - Intravenous    sodium chloride 0.9 % flush 3-10 mL 3-10 mL As Needed 4/4/2019     Sig - Route: Infuse 3-10 mL into a venous catheter As Needed for Line Care. - Intravenous    ibuprofen (ADVIL,MOTRIN) tablet 400 mg (Discontinued) 400 mg Every 4 Hours PRN 4/4/2019 4/4/2019    Sig - Route: Take 1 tablet by mouth Every 4 (Four) Hours As Needed for Mild Pain . - Oral    lamoTRIgine (LaMICtal) tablet 150 mg (Discontinued) 150 mg Daily 4/4/2019 4/4/2019    Sig - Route: Take 150 mg by mouth Daily. - Oral    lisinopril (PRINIVIL,ZESTRIL) tablet 2.5 mg (Discontinued) 2.5 mg Daily 4/4/2019 4/4/2019    Sig - Route: Take 0.5 tablets by mouth Daily. - Oral    sodium chloride 0.9 % flush 10 mL (Discontinued) 10 mL As Needed 4/3/2019 4/3/2019    Sig - Route: Infuse 10 mL into a venous catheter As Needed for Line Care. - Intravenous    Cosign for Ordering: Accepted by Daniel Gale MD on 4/3/2019  4:21 PM    sodium chloride 0.9 % infusion (Discontinued) 250 mL/hr Continuous 4/3/2019 4/4/2019    Sig - Route: Infuse 250 mL/hr into a venous catheter Continuous. - Intravenous    venlafaxine (EFFEXOR) tablet 150 mg (Discontinued) 150 mg 2 Times Daily With Meals 4/4/2019 4/4/2019    Sig - Route: Take 4 tablets by mouth 2 (Two) Times a Day With Meals. - Oral            Lab Results (last 24 hours)      Procedure Component Value Units Date/Time    POC Glucose Once [[id number]]  (Normal) Collected:  04/04/19 1102    Specimen:  Blood Updated:  04/04/19 1103     Glucose 99 mg/dL     POC Glucose Once [[id number]]  (Normal) Collected:  04/04/19 1025    Specimen:  Blood Updated:  04/04/19 1027     Glucose 91 mg/dL     POC Glucose Once [[id number]]  (Normal) Collected:  04/04/19 0928    Specimen:  Blood Updated:  04/04/19 0929     Glucose 107 mg/dL     Calcium, Ionized [[id number]]  (Normal) Collected:  04/04/19 0757    Specimen:  Blood Updated:  04/04/19 0912     Ionized Calcium 1.23 mmol/L     Phosphorus [[id number]]  (Abnormal) Collected:  04/04/19 0757    Specimen:  Blood Updated:  04/04/19 0858     Phosphorus 1.9 mg/dL     Basic Metabolic Panel [[id number]]  (Abnormal) Collected:  04/04/19 0757    Specimen:  Blood Updated:  04/04/19 0858     Glucose 133 mg/dL      BUN 18 mg/dL      Creatinine 0.90 mg/dL      Sodium 138 mmol/L      Potassium 3.3 mmol/L      Chloride 108 mmol/L      CO2 22.0 mmol/L      Calcium 7.7 mg/dL      eGFR Non African Amer 71 mL/min/1.73      BUN/Creatinine Ratio 20.0     Anion Gap 8.0 mmol/L     Narrative:       National Kidney Foundation Guidelines    Stage     Description        GFR  1         Normal or High     90+  2         Mild decrease      60-89  3         Moderate decrease  30-59  4         Severe decrease    15-29  5         Kidney failure     <15    The MDRD GFR formula is only valid for adults with stable renal function between ages 18 and 70.    Magnesium [[id number]]  (Normal) Collected:  04/04/19 0757    Specimen:  Blood Updated:  04/04/19 0858     Magnesium 1.9 mg/dL     POC Glucose Once [[id number]]  (Normal) Collected:  04/04/19 0817    Specimen:  Blood Updated:  04/04/19 0822     Glucose 111 mg/dL     Hemoglobin A1c [[id number]]  (Abnormal) Collected:  04/04/19 0620    Specimen:  Blood Updated:  04/04/19 0816     Hemoglobin A1C 11.30 %     Narrative:       The American Diabetes  Association recommends maintenance of Hemoglobin A1C at 7.0% or lower. Goals for Hemoglobin A1C reduction may need to be modified if hypoglycemia is a problem.    Hepatitis Panel, Acute [[id number]]  (Normal) Collected:  04/04/19 0620    Specimen:  Blood Updated:  04/04/19 0758     Hepatitis B Surface Ag Non-Reactive     Hep A IgM Non-Reactive     Comment: Results may be falsely decreased if patient taking Biotin.        Hep B C IgM Non-Reactive     Comment: Results may be falsely decreased if patient taking Biotin.        Hepatitis C Ab Non-Reactive    Comprehensive Metabolic Panel [[id number]]  (Abnormal) Collected:  04/04/19 0620    Specimen:  Blood Updated:  04/04/19 0738     Glucose 155 mg/dL      BUN 19 mg/dL      Creatinine 0.91 mg/dL      Sodium 137 mmol/L      Potassium 3.2 mmol/L      Chloride 107 mmol/L      CO2 24.0 mmol/L      Calcium 7.6 mg/dL      Total Protein 5.2 g/dL      Albumin 3.23 g/dL      ALT (SGPT) 62 U/L      AST (SGOT) 44 U/L      Alkaline Phosphatase 76 U/L      Total Bilirubin 0.5 mg/dL      eGFR Non African Amer 70 mL/min/1.73      Globulin 2.0 gm/dL      A/G Ratio 1.6 g/dL      BUN/Creatinine Ratio 20.9     Anion Gap 6.0 mmol/L     Narrative:       National Kidney Foundation Guidelines    Stage     Description        GFR  1         Normal or High     90+  2         Mild decrease      60-89  3         Moderate decrease  30-59  4         Severe decrease    15-29  5         Kidney failure     <15    The MDRD GFR formula is only valid for adults with stable renal function between ages 18 and 70.    Phosphorus [[id number]]  (Abnormal) Collected:  04/04/19 0620    Specimen:  Blood Updated:  04/04/19 0730     Phosphorus 1.8 mg/dL     Magnesium [[id number]]  (Normal) Collected:  04/04/19 0620    Specimen:  Blood Updated:  04/04/19 0730     Magnesium 1.9 mg/dL     TSH [[id number]]  (Abnormal) Collected:  04/04/19 0620    Specimen:  Blood Updated:  04/04/19 0730     TSH 18.927 mIU/mL     Narrative:        Due to abnormal TSH results, suggest ordering Free T4.    CBC & Differential [[id number]] Collected:  04/04/19 0620    Specimen:  Blood Updated:  04/04/19 0719    Narrative:       The following orders were created for panel order CBC & Differential.  Procedure                               Abnormality         Status                     ---------                               -----------         ------                     CBC Auto Differential[[id number]]        Abnormal            Final result                 Please view results for these tests on the individual orders.    CBC Auto Differential [[id number]]  (Abnormal) Collected:  04/04/19 0620    Specimen:  Blood Updated:  04/04/19 0719     WBC 5.83 10*3/mm3      RBC 3.99 10*6/mm3      Hemoglobin 12.4 g/dL      Hematocrit 36.5 %      MCV 91.5 fL      MCH 31.1 pg      MCHC 34.0 g/dL      RDW 13.0 %      RDW-SD 44.1 fl      MPV 9.3 fL      Platelets 363 10*3/mm3      Neutrophil % 60.4 %      Lymphocyte % 30.4 %      Monocyte % 6.5 %      Eosinophil % 1.7 %      Basophil % 0.7 %      Immature Grans % 0.3 %      Neutrophils, Absolute 3.52 10*3/mm3      Lymphocytes, Absolute 1.77 10*3/mm3      Monocytes, Absolute 0.38 10*3/mm3      Eosinophils, Absolute 0.10 10*3/mm3      Basophils, Absolute 0.04 10*3/mm3      Immature Grans, Absolute 0.02 10*3/mm3     Influenza A & B, RT PCR - Swab, Nasopharynx [[id number]]  (Normal) Collected:  04/04/19 0108    Specimen:  Swab from Nasopharynx Updated:  04/04/19 0719     Influenza A PCR Not Detected     Influenza B PCR Not Detected    Calcium, Ionized [[id number]]  (Normal) Collected:  04/04/19 0620    Specimen:  Blood Updated:  04/04/19 0707     Ionized Calcium 1.21 mmol/L     Lactic Acid, Plasma [[id number]]  (Normal) Collected:  04/04/19 0620    Specimen:  Blood Updated:  04/04/19 0701     Lactate 0.6 mmol/L      Comment: Falsely depressed results may occur on samples drawn from patients receiving N-Acetylcysteine (NAC) or  Metamizole.       POC Glucose Once [[id number]]  (Abnormal) Collected:  04/04/19 0558    Specimen:  Blood Updated:  04/04/19 0610     Glucose 142 mg/dL     POC Glucose Once [[id number]]  (Abnormal) Collected:  04/04/19 0500    Specimen:  Blood Updated:  04/04/19 0502     Glucose 157 mg/dL     POC Glucose Once [[id number]]  (Abnormal) Collected:  04/04/19 0402    Specimen:  Blood Updated:  04/04/19 0414     Glucose 165 mg/dL     POC Glucose Once [[id number]]  (Abnormal) Collected:  04/04/19 0307    Specimen:  Blood Updated:  04/04/19 0318     Glucose 178 mg/dL     POC Glucose Once [[id number]]  (Abnormal) Collected:  04/04/19 0157    Specimen:  Blood Updated:  04/04/19 0208     Glucose 217 mg/dL     Urine Drug Screen - Urine, Clean Catch [[id number]]  (Abnormal) Collected:  04/03/19 1535    Specimen:  Urine, Clean Catch Updated:  04/04/19 0148     THC, Screen, Urine Positive     Phencyclidine (PCP), Urine Negative     Cocaine Screen, Urine Positive     Methamphetamine Negative     Opiate Screen Negative     Amphetamine Screen, Urine Negative     Benzodiazepine Screen, Urine Negative     Tricyclic Antidepressants Screen Negative     Methadone Screen, Urine Negative     Barbiturates Screen, Urine Negative     Oxycodone Screen, Urine Negative     Propoxyphene Screen Negative     Buprenorphine, Screen, Urine Negative    Narrative:       Cutoff For Drugs Screened:    Amphetamines               500 ng/ml  Barbiturates               200 ng/ml  Benzodiazepines            150 ng/ml  Cocaine                    150 ng/ml  Methadone                  200 ng/ml  Opiates                    100 ng/ml  Phencyclidine               25 ng/ml  THC                            50 ng/ml  Methamphetamine            500 ng/ml  Tricyclic Antidepressants  300 ng/ml  Oxycodone                  100 ng/ml  Propoxyphene               300 ng/ml  Buprenorphine               10 ng/ml    The normal value for all drugs tested is negative. This report includes  unconfirmed screening results, with the cutoff values listed, to be used for medical treatment purposes only.  Unconfirmed results must not be used for non-medical purposes such as employment or legal testing.  Clinical consideration should be applied to any drug of abuse test, particularly when unconfirmed results are used.      POC Glucose Once [[id number]]  (Abnormal) Collected:  04/04/19 0048    Specimen:  Blood Updated:  04/04/19 0059     Glucose 207 mg/dL     Basic Metabolic Panel [[id number]]  (Abnormal) Collected:  04/04/19 0017    Specimen:  Blood Updated:  04/04/19 0045     Glucose 239 mg/dL      BUN 21 mg/dL      Creatinine 1.01 mg/dL      Sodium 135 mmol/L      Potassium 3.9 mmol/L      Chloride 102 mmol/L      CO2 19.0 mmol/L      Calcium 7.6 mg/dL      eGFR Non African Amer 62 mL/min/1.73      BUN/Creatinine Ratio 20.8     Anion Gap 14.0 mmol/L     Narrative:       National Kidney Foundation Guidelines    Stage     Description        GFR  1         Normal or High     90+  2         Mild decrease      60-89  3         Moderate decrease  30-59  4         Severe decrease    15-29  5         Kidney failure     <15    The MDRD GFR formula is only valid for adults with stable renal function between ages 18 and 70.    POC Glucose Once [[id number]]  (Abnormal) Collected:  04/03/19 2353    Specimen:  Blood Updated:  04/03/19 2355     Glucose 199 mg/dL     POC Glucose Once [[id number]]  (Abnormal) Collected:  04/03/19 2159    Specimen:  Blood Updated:  04/03/19 2200     Glucose 183 mg/dL     Blood Gas, Arterial With Co-Ox [[id number]]  (Abnormal) Collected:  04/03/19 2143    Specimen:  Arterial Blood Updated:  04/03/19 2147     Site Right Radial     Gael's Test N/A     pH, Arterial 7.316 pH units      Comment: 84 Value below reference range        pCO2, Arterial 36.5 mm Hg      pO2, Arterial 94.5 mm Hg      HCO3, Arterial 18.6 mmol/L      Base Excess, Arterial -6.9 mmol/L      Hemoglobin, Blood Gas 13.6 g/dL       Hematocrit, Blood Gas 41.7 %      Oxyhemoglobin 95.1 %      Methemoglobin 1.30 %      Carboxyhemoglobin 1.3 %      CO2 Content 19.7 mmol/L      Temperature 37.0 C      Barometric Pressure for Blood Gas -- mmHg      Comment: N/A        Modality Room Air     FIO2 21 %      Ventilator Mode       Comment: Meter: [id number]     :  [id number]        Note --     pH, Temp Corrected 7.316 pH Units      pCO2, Temperature Corrected 36.5 mm Hg      pO2, Temperature Corrected 94.5 mm Hg     POC CHEM 8 [[id number]]  (Abnormal) Collected:  04/03/19 2050    Specimen:  Blood Updated:  04/03/19 2054     Glucose 206 mg/dL      BUN 27 mg/dL      Creatinine 0.70 mg/dL      Sodium 136 mmol/L      Potassium 4.1 mmol/L      Chloride 100 mmol/L      Total CO2 19 mmol/L      Hemoglobin 13.6 g/dL      Comment: Serial Number: [id number]:  [id number]        Hematocrit 40 %      Ionized Calcium 1.11 mmol/L     POC Glucose Once [[id number]]  (Abnormal) Collected:  04/03/19 1912    Specimen:  Blood Updated:  04/03/19 1914     Glucose 203 mg/dL     POC Glucose Once [[id number]]  (Abnormal) Collected:  04/03/19 1839    Specimen:  Blood Updated:  04/03/19 1859     Glucose 214 mg/dL     POC Glucose Once [[id number]]  (Abnormal) Collected:  04/03/19 1657    Specimen:  Blood Updated:  04/03/19 1658     Glucose 267 mg/dL     Ketone Bodies, Serum (Not performed at [place]) [[id number]] Collected:  04/03/19 1532    Specimen:  Blood Updated:  04/03/19 1624    Narrative:       The following orders were created for panel order Ketone Bodies, Serum (Not performed at [place]).  Procedure                               Abnormality         Status                     ---------                               -----------         ------                     Beta Hydroxybutyrate Tom...[[id number]]  Abnormal            Final result                 Please view results for these tests on the individual orders.    Beta Hydroxybutyrate Quantitative [[id number]]   (Abnormal) Collected:  04/03/19 1532    Specimen:  Blood Updated:  04/03/19 1624     Beta-Hydroxybutyrate Quant 6.000 mmol/L     Narrative:       In the assessment of possible diabetic ketoacidosis, the test should be interpreted along with other clinical and laboratory findings.  A level greater than 1 mmol/L should require further evaluation and levels of more than 3 mmol/L require immediate medical review.    Urinalysis With Microscopic If Indicated (No Culture) - Urine, Clean Catch [[id number]]  (Abnormal) Collected:  04/03/19 1535    Specimen:  Urine, Clean Catch Updated:  04/03/19 1623     Color, UA Yellow     Appearance, UA Clear     pH, UA <=5.0     Specific Gravity, UA 1.023     Glucose, UA >=1000 mg/dL (3+)     Ketones, UA >=160 mg/dL (4+)     Bilirubin, UA Negative     Blood, UA Negative     Protein, UA 30 mg/dL (1+)     Leuk Esterase, UA Negative     Nitrite, UA Negative     Urobilinogen, UA 0.2 E.U./dL    Urinalysis, Microscopic Only - Urine, Clean Catch [[id number]] Collected:  04/03/19 1535    Specimen:  Urine, Clean Catch Updated:  04/03/19 1623     RBC, UA 0-2 /HPF      WBC, UA 0-2 /HPF      Bacteria, UA None Seen /HPF      Squamous Epithelial Cells, UA 0-2 /HPF      Hyaline Casts, UA 0-6 /LPF      Methodology Automated Microscopy    Comprehensive Metabolic Panel [[id number]]  (Abnormal) Collected:  04/03/19 1532    Specimen:  Blood Updated:  04/03/19 1610     Glucose 334 mg/dL      BUN 29 mg/dL      Creatinine 1.41 mg/dL      Sodium 129 mmol/L      Potassium 4.6 mmol/L      Chloride 93 mmol/L      CO2 17.0 mmol/L      Calcium 9.0 mg/dL      Total Protein 6.7 g/dL      Albumin 4.29 g/dL      ALT (SGPT) 89 U/L      AST (SGOT) 65 U/L      Alkaline Phosphatase 111 U/L      Total Bilirubin 0.6 mg/dL      eGFR Non African Amer 42 mL/min/1.73      Globulin 2.4 gm/dL      A/G Ratio 1.8 g/dL      BUN/Creatinine Ratio 20.6     Anion Gap 19.0 mmol/L     Narrative:       [employer]  Guidelines    Stage     Description        GFR  1         Normal or High     90+  2         Mild decrease      60-89  3         Moderate decrease  30-59  4         Severe decrease    15-29  5         Kidney failure     <15    The MDRD GFR formula is only valid for adults with stable renal function between ages 18 and 70.    POCT pregnancy, urine [[id number]]  (Normal) Collected:  04/03/19 1601    Specimen:  Urine Updated:  04/03/19 1602     HCG, Urine, QL Negative     Lot Number [id number]     Internal Positive Control Presumptive Positive     Internal Negative Control Presumptive Negative    CBC & Differential [[id number]] Collected:  04/03/19 1532    Specimen:  Blood Updated:  04/03/19 1546    Narrative:       The following orders were created for panel order CBC & Differential.  Procedure                               Abnormality         Status                     ---------                               -----------         ------                     CBC Auto Differential[[id number]]        Abnormal            Final result                 Please view results for these tests on the individual orders.    CBC Auto Differential [[id number]]  (Abnormal) Collected:  04/03/19 1532    Specimen:  Blood Updated:  04/03/19 1546     WBC 9.38 10*3/mm3      RBC 4.73 10*6/mm3      Hemoglobin 15.1 g/dL      Hematocrit 44.3 %      MCV 93.7 fL      MCH 31.9 pg      MCHC 34.1 g/dL      RDW 13.0 %      RDW-SD 44.5 fl      MPV 9.8 fL      Platelets 495 10*3/mm3      Neutrophil % 77.2 %      Lymphocyte % 18.3 %      Monocyte % 3.5 %      Eosinophil % 0.5 %      Basophil % 0.5 %      Immature Grans % 0.2 %      Neutrophils, Absolute 7.23 10*3/mm3      Lymphocytes, Absolute 1.72 10*3/mm3      Monocytes, Absolute 0.33 10*3/mm3      Eosinophils, Absolute 0.05 10*3/mm3      Basophils, Absolute 0.05 10*3/mm3      Immature Grans, Absolute 0.02 10*3/mm3     POC Glucose Once [[id number]]  (Abnormal) Collected:  04/03/19 1530    Specimen:  Blood  Updated:  04/03/19 1531     Glucose 324 mg/dL         Imaging Results (last 24 hours)     Procedure Component Value Units Date/Time    XR Chest 1 View [[id number]] Collected:  04/04/19 0335     Updated:  04/04/19 0338    Narrative:       CR Chest 1 Vw    INDICATION:   Shortness of breath earlier in the evening     COMPARISON:    6-17    FINDINGS:  Single portable AP view of the chest.    Support lines and tubes are unchanged.    The heart and mediastinal contours are normal. The lungs are clear. No pneumothorax or pleural effusion.       Impression:       No acute findings.    Signer Name: Cosme Diallo MD   Signed: 4/4/2019 3:35 AM   Workstation Name: [id number]       CT Abdomen Pelvis Without Contrast [[id number]] Collected:  04/03/19 1942     Updated:  04/03/19 1945    Narrative:       CT Abdomen Pelvis WO    INDICATION:   Nausea and fatigue, chills and abdominal cramping    TECHNIQUE:   CT of the abdomen and pelvis without contrast. Coronal and sagittal reconstructions were obtained.  Radiation dose reduction techniques included automated exposure control or exposure modulation based on body size. Radiation audit for number of CT and  nuclear cardiology exams performed in the last year: 0.      COMPARISON:   None available.    FINDINGS:    Visualized lung bases are unremarkable.    Abdomen: The liver and gallbladder are normal. The spleen and pancreas and kidneys and adrenal glands are normal. The aorta is normal in caliber. There is no bowel obstruction, or abdominal or retroperitoneal mass or adenopathy.    Pelvis:  The appendix is normal. There is no pelvic mass, adenopathy, inflammatory change or abnormal fluid collection. There is no hernia or bowel obstruction.    There are some spinal degenerative change but no acute bony abnormality.      Impression:       No acute abnormality. No renal or bowel or biliary obstruction. Normal appendix.      Signer Name: Ronald Benson MD   Signed: 4/3/2019 7:42 PM    Workstation Name: [id number]              Physician Progress Notes (last 24 hours) (Notes from 4/3/2019 11:24 AM through 2019 11:24 AM)      Marcos Burrows MD at 2019  9:27 AM              [facility] Medicine Services  PROGRESS NOTE    Patient Name: Marisol Cuellar  : 1985  MRN: [id number]    Date of Admission: 4/3/2019  Length of Stay: 0  Primary Care Physician: Provider, No Known    Subjective   Subjective     CC:  DKA    HPI:  Denies nausea, says she is hungry. Legs are cramping a bit.  Didn't sleep very well last night    Review of Systems  Gen- No fevers, chills  CV- No chest pain, palpitations  Resp- No cough, dyspnea  GI- No N/V/D, abd pain      Otherwise ROS is negative except as mentioned in the HPI.    Objective   Objective     Vital Signs:   Temp:  [98.1 °F (36.7 °C)-98.5 °F (36.9 °C)] 98.5 °F (36.9 °C)  Heart Rate:  [] 74  Resp:  [14-18] 16  BP: ()/(49-69) 102/65        Physical Exam:  Constitutional -very somnolent, takes several minutes to arouse enough to hold a conversation. Non toxic  HEENT-NCAT, mucous membranes moist  CV-RRR, S1 S2 normal, no m/r/g  Resp-CTAB, no wheezes, rhonchi or rales  Abd-soft, non-tender, non-distended, normo active bowel sounds  Ext-No lower extremity cyanosis, clubbing or edema bilaterally  Neuro-somnolent, then more laert, speech mostly clear, content appropriate, moves all extremities   Psych-flat affect   Skin- No rash on exposed UE or LE bilaterally      Results Reviewed:  I have personally reviewed current lab, radiology, and data and agree.    Results from last 7 days   Lab Units 19  0619  1532   WBC 10*3/mm3 5.83  --  9.38   HEMOGLOBIN g/dL 12.4  --  15.1   HEMOGLOBIN, POC g/dL  --  13.6  --    HEMATOCRIT % 36.5  --  44.3*   HEMATOCRIT POC %  --  40  --    PLATELETS 10*3/mm3 363  --  495*     Results from last 7 days   Lab Units 19  0757 19  0620 19  0017   04/03/19  1532   SODIUM mmol/L 138 137 135  --  129*   POTASSIUM mmol/L 3.3* 3.2* 3.9  --  4.6   CHLORIDE mmol/L 108 107 102  --  93*   CO2 mmol/L 22.0 24.0 19.0*  --  17.0*   BUN mg/dL 18 19 21  --  29*   CREATININE mg/dL 0.90 0.91 1.01   < > 1.41*   GLUCOSE mg/dL 133* 155* 239*  --  334*   CALCIUM mg/dL 7.7* 7.6* 7.6*  --  9.0   ALT (SGPT) U/L  --  62*  --   --  89*   AST (SGOT) U/L  --  44*  --   --  65*    < > = values in this interval not displayed.     Estimated Creatinine Clearance: 103 mL/min (by C-G formula based on SCr of 0.9 mg/dL).    No results found for: BNP    Microbiology Results Abnormal     Procedure Component Value - Date/Time    Influenza A & B, RT PCR - Swab, Nasopharynx [[id number]]  (Normal) Collected:  04/04/19 0108    Lab Status:  Final result Specimen:  Swab from Nasopharynx Updated:  04/04/19 0719     Influenza A PCR Not Detected     Influenza B PCR Not Detected          Imaging Results (last 24 hours)     Procedure Component Value Units Date/Time    XR Chest 1 View [[id number]] Collected:  04/04/19 0335     Updated:  04/04/19 0338    Narrative:       CR Chest 1 Vw    INDICATION:   Shortness of breath earlier in the evening     COMPARISON:    6-17    FINDINGS:  Single portable AP view of the chest.    Support lines and tubes are unchanged.    The heart and mediastinal contours are normal. The lungs are clear. No pneumothorax or pleural effusion.       Impression:       No acute findings.    Signer Name: Cosme Diallo MD   Signed: 4/4/2019 3:35 AM   Workstation Name: [id number]       CT Abdomen Pelvis Without Contrast [[id number]] Collected:  04/03/19 1942     Updated:  04/03/19 1945    Narrative:       CT Abdomen Pelvis WO    INDICATION:   Nausea and fatigue, chills and abdominal cramping    TECHNIQUE:   CT of the abdomen and pelvis without contrast. Coronal and sagittal reconstructions were obtained.  Radiation dose reduction techniques included automated exposure control or exposure  modulation based on body size. Radiation audit for number of CT and  nuclear cardiology exams performed in the last year: 0.      COMPARISON:   None available.    FINDINGS:    Visualized lung bases are unremarkable.    Abdomen: The liver and gallbladder are normal. The spleen and pancreas and kidneys and adrenal glands are normal. The aorta is normal in caliber. There is no bowel obstruction, or abdominal or retroperitoneal mass or adenopathy.    Pelvis:  The appendix is normal. There is no pelvic mass, adenopathy, inflammatory change or abnormal fluid collection. There is no hernia or bowel obstruction.    There are some spinal degenerative change but no acute bony abnormality.      Impression:       No acute abnormality. No renal or bowel or biliary obstruction. Normal appendix.      Signer Name: Ronald Benson MD   Signed: 4/3/2019 7:42 PM   Workstation Name: [email or web address]-                  I have reviewed the medications:    Current Facility-Administered Medications:   •  acetaminophen (TYLENOL) tablet 325 mg, 325 mg, Oral, Q6H PRN, Marcos Burrows MD  •  dextrose (D50W) 25 g/ 50mL Intravenous Solution 25-50 mL, 25-50 mL, Intravenous, Q30 Min PRN, Claudia Lucas PA-C  •  dextrose 5 % and sodium chloride 0.45 % with KCl 20 mEq/L infusion, 150 mL/hr, Intravenous, Continuous, Marcos Burrows MD, Last Rate: 100 mL/hr at 04/04/19 0824, 100 mL/hr at 04/04/19 0824  •  enoxaparin (LOVENOX) syringe 40 mg, 40 mg, Subcutaneous, Q24H, Claudia Lucas PA-C, 40 mg at 04/04/19 0819  •  insulin regular (HumuLIN R,NovoLIN R) 100 Units in sodium chloride 0.9 % 100 mL (1 Units/mL) infusion, 1-20 Units/hr, Intravenous, Titrated, Claudia Lucas PA-C, Last Rate: 4 mL/hr at 04/04/19 0134, 4 Units/hr at 04/04/19 0134  •  levothyroxine (SYNTHROID, LEVOTHROID) tablet 300 mcg, 300 mcg, Oral, Q AM, Claudia Lucas PA-C, 300 mcg at 04/04/19 0504  •  potassium chloride (MICRO-K) CR capsule 40 mEq, 40 mEq, Oral, PRN, 40 mEq at  "04/04/19 0819 **OR** potassium chloride (KLOR-CON) packet 40 mEq, 40 mEq, Oral, PRN **OR** potassium chloride 10 mEq in 100 mL IVPB, 10 mEq, Intravenous, PRN, Claudia Lucas PA-C  •  sodium chloride 0.9 % flush 10 mL, 10 mL, Intravenous, PRN, Daniel Gale MD  •  sodium chloride 0.9 % flush 3 mL, 3 mL, Intravenous, Q12H, Claudia Lucas PA-C, 3 mL at 04/04/19 0820  •  sodium chloride 0.9 % flush 3-10 mL, 3-10 mL, Intravenous, PRN, Claudia Lucas PA-C      Assessment/Plan   Assessment / Plan     Active Hospital Problems    Diagnosis POA   • **DKA (diabetic ketoacidoses) (CMS/HCC) [E13.10] Unknown   • Thrombocytosis (CMS/HCC) [D47.3] Unknown   • Elevated transaminase level [R74.0] Unknown   • Acute kidney injury (CMS/HCC) [N17.9] Yes   • Anxiety and depression [F41.9, F32.9] Yes   • hx of Graves disease, now hypothyroid [E05.00] Yes          Brief Hospital Course to date:  Marisol Cuellar is a 33 y.o. female with h/o DMI presents with DKA.    DKA  - gap has closed, now feels hungry, denies nausea  - will resume diet and start basal bolus once liver US complete (must remain NPO for this test).    DMII  - poor control, a1c > 11  - diabetes education requested  - patient tells me her sugars have been high for the past two days because she forgot to reorder her insulin supplies for her pump, or test strips, but states they now have all arrived via mail order at her home.   - Ms Cuellar did not bring her insulin pump to the Emergency Room. When I asked if her roommate can bring her pump to the hospital so that we can review the settings, Ms Cuellar states that her roommate \"would not understand it\" and declines to ask him to bring the pump here.  - We did discuss her prior insulin use prior to getting the pump (she states she was on 75/25, as is reflected in our prior admission records, as well as lantus/humalog in the past).   - I have placed a call to the patient's PCP (Abraham Brown PA-C) to coordinate " transition to outpatient care, as I think she will need close followup    Substance abuse  - UDS positive for cocaine. Patient denied this morning regarding any IVDA, but + prior history  - social work consult for rehab options    Elevated LFTs  - improved, injury pattern, mild  - hep panel negative  - US pending    JOSE ANTONIO  - improved, continue fluids    Hypothyroid  - elevated TSH  - suspect non-compliance    Anxiety/Depression    Thrombocytosis  - resolved        DVT Prophylaxis:  lovenox    Disposition: I expect the patient to be discharged 0-1 days    CODE STATUS:   Code Status and Medical Interventions:   Ordered at: 04/03/19 5483     Level Of Support Discussed With:    Patient     Code Status:    CPR     Medical Interventions (Level of Support Prior to Arrest):    Full         Electronically signed by Marcos Burrows MD, 04/04/19, 9:27 AM.        Electronically signed by Marcos Burrows MD at 4/4/2019  9:56 AM

## 2019-04-04 NOTE — PLAN OF CARE
Problem: Patient Care Overview  Goal: Plan of Care Review  Outcome: Ongoing (interventions implemented as appropriate)   04/04/19 0222   Coping/Psychosocial   Plan of Care Reviewed With patient   Plan of Care Review   Progress no change   OTHER   Outcome Summary VSS, pt resting comfortably at this time, monitoring blood glucose Q1H.

## 2019-04-04 NOTE — PROGRESS NOTES
Continued Stay Note  [facility]     Patient Name: Marisol Cuellar  MRN: [id number]  Today's Date: 4/4/2019    Admit Date: 4/3/2019    Discharge Plan     Row Name 04/04/19 1237       Plan    Plan  discharge plan    Plan Comments  SW to see regarding hx of drug abuse.  CM/SW will cont to follow,    Row Name 04/04/19 1236       Plan    Plan  discharge plan    Patient/Family in Agreement with Plan  yes    Plan Comments  Plan is home with roomate at discharge. Pt denies discharge needs. CM will cont to follow,        Discharge Codes    No documentation.       Expected Discharge Date and Time     Expected Discharge Date Expected Discharge Time    Apr 6, 2019             Conchita Lopez RN

## 2019-04-04 NOTE — PROGRESS NOTES
[facility] Medicine Services  PROGRESS NOTE    Patient Name: Marisol Cuellar  : 1985  MRN: [id number]    Date of Admission: 4/3/2019  Length of Stay: 0  Primary Care Physician: Provider, No Known    Subjective   Subjective     CC:  DKA    HPI:  Denies nausea, says she is hungry. Legs are cramping a bit.  Didn't sleep very well last night    Review of Systems  Gen- No fevers, chills  CV- No chest pain, palpitations  Resp- No cough, dyspnea  GI- No N/V/D, abd pain      Otherwise ROS is negative except as mentioned in the HPI.    Objective   Objective     Vital Signs:   Temp:  [98.1 °F (36.7 °C)-98.5 °F (36.9 °C)] 98.5 °F (36.9 °C)  Heart Rate:  [] 74  Resp:  [14-18] 16  BP: ()/(49-69) 102/65        Physical Exam:  Constitutional -very somnolent, takes several minutes to arouse enough to hold a conversation. Non toxic  HEENT-NCAT, mucous membranes moist  CV-RRR, S1 S2 normal, no m/r/g  Resp-CTAB, no wheezes, rhonchi or rales  Abd-soft, non-tender, non-distended, normo active bowel sounds  Ext-No lower extremity cyanosis, clubbing or edema bilaterally  Neuro-somnolent, then more laert, speech mostly clear, content appropriate, moves all extremities   Psych-flat affect   Skin- No rash on exposed UE or LE bilaterally      Results Reviewed:  I have personally reviewed current lab, radiology, and data and agree.    Results from last 7 days   Lab Units 19  0619  1532   WBC 10*3/mm3 5.83  --  9.38   HEMOGLOBIN g/dL 12.4  --  15.1   HEMOGLOBIN, POC g/dL  --  13.6  --    HEMATOCRIT % 36.5  --  44.3*   HEMATOCRIT POC %  --  40  --    PLATELETS 10*3/mm3 363  --  495*     Results from last 7 days   Lab Units 19  0757 19  0620 19  0017  19  1532   SODIUM mmol/L 138 137 135  --  129*   POTASSIUM mmol/L 3.3* 3.2* 3.9  --  4.6   CHLORIDE mmol/L 108 107 102  --  93*   CO2 mmol/L 22.0 24.0 19.0*  --  17.0*   BUN mg/dL 18 19 21  --  29*    CREATININE mg/dL 0.90 0.91 1.01   < > 1.41*   GLUCOSE mg/dL 133* 155* 239*  --  334*   CALCIUM mg/dL 7.7* 7.6* 7.6*  --  9.0   ALT (SGPT) U/L  --  62*  --   --  89*   AST (SGOT) U/L  --  44*  --   --  65*    < > = values in this interval not displayed.     Estimated Creatinine Clearance: 103 mL/min (by C-G formula based on SCr of 0.9 mg/dL).    No results found for: BNP    Microbiology Results Abnormal     Procedure Component Value - Date/Time    Influenza A & B, RT PCR - Swab, Nasopharynx [[id number]]  (Normal) Collected:  04/04/19 0108    Lab Status:  Final result Specimen:  Swab from Nasopharynx Updated:  04/04/19 0719     Influenza A PCR Not Detected     Influenza B PCR Not Detected          Imaging Results (last 24 hours)     Procedure Component Value Units Date/Time    XR Chest 1 View [[id number]] Collected:  04/04/19 0335     Updated:  04/04/19 0338    Narrative:       CR Chest 1 Vw    INDICATION:   Shortness of breath earlier in the evening     COMPARISON:    6-17    FINDINGS:  Single portable AP view of the chest.    Support lines and tubes are unchanged.    The heart and mediastinal contours are normal. The lungs are clear. No pneumothorax or pleural effusion.       Impression:       No acute findings.    Signer Name: Cosme Diallo MD   Signed: 4/4/2019 3:35 AM   Workstation Name: [id number]       CT Abdomen Pelvis Without Contrast [[id number]] Collected:  04/03/19 1942     Updated:  04/03/19 1945    Narrative:       CT Abdomen Pelvis WO    INDICATION:   Nausea and fatigue, chills and abdominal cramping    TECHNIQUE:   CT of the abdomen and pelvis without contrast. Coronal and sagittal reconstructions were obtained.  Radiation dose reduction techniques included automated exposure control or exposure modulation based on body size. Radiation audit for number of CT and  nuclear cardiology exams performed in the last year: 0.      COMPARISON:   None available.    FINDINGS:    Visualized lung bases are  unremarkable.    Abdomen: The liver and gallbladder are normal. The spleen and pancreas and kidneys and adrenal glands are normal. The aorta is normal in caliber. There is no bowel obstruction, or abdominal or retroperitoneal mass or adenopathy.    Pelvis:  The appendix is normal. There is no pelvic mass, adenopathy, inflammatory change or abnormal fluid collection. There is no hernia or bowel obstruction.    There are some spinal degenerative change but no acute bony abnormality.      Impression:       No acute abnormality. No renal or bowel or biliary obstruction. Normal appendix.      Signer Name: Ronald Benson MD   Signed: 4/3/2019 7:42 PM   Workstation Name: [email or web address]-                  I have reviewed the medications:    Current Facility-Administered Medications:   •  acetaminophen (TYLENOL) tablet 325 mg, 325 mg, Oral, Q6H PRN, Marcos Burrows MD  •  dextrose (D50W) 25 g/ 50mL Intravenous Solution 25-50 mL, 25-50 mL, Intravenous, Q30 Min PRN, Claudia Lucas PA-C  •  dextrose 5 % and sodium chloride 0.45 % with KCl 20 mEq/L infusion, 150 mL/hr, Intravenous, Continuous, Marcos Burrows MD, Last Rate: 100 mL/hr at 04/04/19 0824, 100 mL/hr at 04/04/19 0824  •  enoxaparin (LOVENOX) syringe 40 mg, 40 mg, Subcutaneous, Q24H, Claudia Lucas PA-C, 40 mg at 04/04/19 0819  •  insulin regular (HumuLIN R,NovoLIN R) 100 Units in sodium chloride 0.9 % 100 mL (1 Units/mL) infusion, 1-20 Units/hr, Intravenous, Titrated, Claudia Lucas PA-C, Last Rate: 4 mL/hr at 04/04/19 0134, 4 Units/hr at 04/04/19 0134  •  levothyroxine (SYNTHROID, LEVOTHROID) tablet 300 mcg, 300 mcg, Oral, Q AM, Claudia Lucas PA-C, 300 mcg at 04/04/19 0504  •  potassium chloride (MICRO-K) CR capsule 40 mEq, 40 mEq, Oral, PRN, 40 mEq at 04/04/19 0819 **OR** potassium chloride (KLOR-CON) packet 40 mEq, 40 mEq, Oral, PRN **OR** potassium chloride 10 mEq in 100 mL IVPB, 10 mEq, Intravenous, PRN, Claudia Lucas PA-C  •  sodium chloride 0.9 %  "flush 10 mL, 10 mL, Intravenous, PRN, Daniel Gale MD  •  sodium chloride 0.9 % flush 3 mL, 3 mL, Intravenous, Q12H, Claudia Lucas PA-C, 3 mL at 04/04/19 0820  •  sodium chloride 0.9 % flush 3-10 mL, 3-10 mL, Intravenous, PRN, Claudia Lucas PA-C      Assessment/Plan   Assessment / Plan     Active Hospital Problems    Diagnosis POA   • **DKA (diabetic ketoacidoses) (CMS/HCC) [E13.10] Unknown   • Thrombocytosis (CMS/HCC) [D47.3] Unknown   • Elevated transaminase level [R74.0] Unknown   • Acute kidney injury (CMS/HCC) [N17.9] Yes   • Anxiety and depression [F41.9, F32.9] Yes   • hx of Graves disease, now hypothyroid [E05.00] Yes          Brief Hospital Course to date:  Marisol Cuellar is a 33 y.o. female with h/o DMI presents with DKA.    DKA  - gap has closed, now feels hungry, denies nausea  - will resume diet and start basal bolus once liver US complete (must remain NPO for this test).    DMII  - poor control, a1c > 11  - diabetes education requested  - patient tells me her sugars have been high for the past two days because she forgot to reorder her insulin supplies for her pump, or test strips, but states they now have all arrived via mail order at her home.   - Ms Cuellar did not bring her insulin pump to the Emergency Room. When I asked if her roommate can bring her pump to the hospital so that we can review the settings, Ms Cuellar states that her roommate \"would not understand it\" and declines to ask him to bring the pump here.  - We did discuss her prior insulin use prior to getting the pump (she states she was on 75/25, as is reflected in our prior admission records, as well as lantus/humalog in the past).   - I have placed a call to the patient's PCP (Abraham Brown PA-C) to coordinate transition to outpatient care, as I think she will need close followup    Substance abuse  - UDS positive for cocaine. Patient denied this morning regarding any IVDA, but + prior history  - social work consult for " rehab options    Elevated LFTs  - improved, injury pattern, mild  - hep panel negative  - US pending    JOSE ANTONIO  - improved, continue fluids    Hypothyroid  - elevated TSH  - suspect non-compliance    Anxiety/Depression    Thrombocytosis  - resolved        DVT Prophylaxis:  lovenox    Disposition: I expect the patient to be discharged 0-1 days    CODE STATUS:   Code Status and Medical Interventions:   Ordered at: 04/03/19 6887     Level Of Support Discussed With:    Patient     Code Status:    CPR     Medical Interventions (Level of Support Prior to Arrest):    Full         Electronically signed by Marcos Burrows MD, 04/04/19, 9:27 AM.

## 2019-04-04 NOTE — H&P
[facility] Medicine Services  HISTORY AND PHYSICAL    Patient Name: Marisol Cuellar  : 1985  MRN: [id number]  Primary Care Physician: Provider, No Known  Date of admission: 4/3/2019      Subjective   Subjective     Chief Complaint: Stomach cramps x 2 days    HPI:  Marisol Cuellar is a 33 y.o. female with medical history significant for Type 1 DM with insulin pump and Hypothyroidism presented to [facility] ED after running out of insulin pump supplies and test strips on Tuesday morning 4/2/19.  She reports onset of dizziness, stomach cramps, and leg pain Tuesday afternoon.  She states she was unable to get to her mother's house for supplies because she felt so sick.  Today she took 10 units of NovoLog insulin from a family member, and took another 12 units of NovoLog at her mother's house by 38 units of Lantus.  She also endorses some SOB but denies fever, chills, cough, congestion. She also endorses decrease urine output with no other urinary symptoms.  No chest pain, palpitations, nausea, vomiting, diarrhea or headache. She denies history of DKA in the past.  Patient denies use of tobacco, alcohol or drugs.  Per record, patient has history of IV drug abuse.  Patient lives with a roommate.    In the emergency department, hypotensive, current labs include glucose 183, potassium 4.1, anion gap 19, creatinine 1.41, alk phos 111, ALT 89, AST 65, PHQ 6.  UA revealed large amounts of glucose, ketones, protein. Negative HCG. Patient given 1.5mg Dilaudid, Zofran and 2L of NS in the ED. The patient will be admitted to the hospitalist service for further medical management.    Review of Systems   Constitutional: Negative for chills and fever.   HENT: Negative for congestion and rhinorrhea.    Eyes: Negative for pain and visual disturbance.   Respiratory: Positive for shortness of breath. Negative for cough and wheezing.    Cardiovascular: Negative for chest pain, palpitations and leg swelling.    Gastrointestinal: Positive for abdominal pain (cramping). Negative for diarrhea, nausea and vomiting.   Endocrine: Positive for polydipsia. Negative for cold intolerance, heat intolerance and polyuria.   Genitourinary: Positive for decreased urine volume. Negative for difficulty urinating and dysuria.   Musculoskeletal: Negative for back pain and joint swelling.   Skin: Negative for rash and wound.   Neurological: Positive for dizziness. Negative for syncope, weakness and headaches.   Hematological: Negative for adenopathy. Does not bruise/bleed easily.   Psychiatric/Behavioral: Negative for agitation and confusion.      Otherwise complete ROS reviewed and is negative except as mentioned in the HPI.    Personal History     Past Medical History:   Diagnosis Date   • Anxiety    • Chronic low back pain    • Depression    • Diabetes mellitus (CMS/HCC)     TYPE 1   • Graves disease    • Herniated lumbar intervertebral disc    • History of Papanicolaou smear of cervix ?    old PCP. Dr. Lee   • IV drug abuse (CMS/HCC)        Past Surgical History:   Procedure Laterality Date   •  SECTION       and    • ENDOMETRIAL ABLATION     • FOOT SURGERY      METAL DINA PLACED IN FOOT   • SINUS SURGERY      ,    • TONSILLECTOMY         Family History: family history includes Congenital heart disease in her son; Diabetes type I in her father; Diabetes type II in her brother; Fibromyalgia in her mother; Heart attack (age of onset: 57) in her father; Macular degeneration in her mother. Otherwise pertinent FHx was reviewed and unremarkable.     Social History:  reports that she has quit smoking. She has never used smokeless tobacco. She reports that she does not drink alcohol or use drugs.  Social History     Social History Narrative    Live with boyfriend and son        Medications:    Available home medication information reviewed.  Medications Prior to Admission   Medication Sig Dispense Refill  Last Dose   • B-D ULTRAFINE III SHORT PEN 31G X 8 MM misc USE UTD WITH TRESIBA  3 Taking   • buPROPion SR (WELLBUTRIN SR) 200 MG 12 hr tablet 1 po qd   Taking   • cetirizine (zyrTEC) 10 MG tablet Take 10 mg by mouth Every Night.   Taking   • clonazePAM (KlonoPIN) 1 MG tablet Take 1 mg by mouth 2 (Two) Times a Day As Needed for Seizures.   Taking   • fluticasone (FLONASE) 50 MCG/ACT nasal spray 2 sprays into each nostril Daily. 1 bottle 3 Not Taking   • glucose blood (ARUN CONTOUR TEST) test strip Use  each 11    • Insulin Degludec (TRESIBA FLEXTOUCH) 200 UNIT/ML solution pen-injector Inject 35 Units under the skin into the appropriate area as directed every night at bedtime. 2 pen 3    • insulin lispro (HUMALOG) 100 UNIT/ML injection Take up to total daily dose 50U daily 20 mL 11    • lamoTRIgine (LAMICTAL) 150 MG tablet Take 150 mg by mouth Daily.   Taking   • levothyroxine (SYNTHROID, LEVOTHROID) 300 MCG tablet Take 1 tablet by mouth Daily. 30 tablet 6    • lisinopril (ZESTRIL) 2.5 MG tablet Take 1 tablet by mouth Daily. 30 tablet 6    • venlafaxine (EFFEXOR) 75 MG tablet Take 150 mg by mouth 2 (Two) Times a Day.   Taking       Allergies   Allergen Reactions   • Sulfa Antibiotics Anaphylaxis   • Peanut-Containing Drug Products Other (See Comments)     Coughing and tightness in chest   • Shellfish-Derived Products Other (See Comments)     Itchy throat and tightness   • Dairy Aid [Lactase] Rash     migraines   • Eggs Or Egg-Derived Products Rash     migraines   • Soybean-Containing Drug Products Rash     migraines   • Wheat Extract Rash     migraines       Objective   Objective     Vital Signs:   Temp:  [98.1 °F (36.7 °C)] 98.1 °F (36.7 °C)  Heart Rate:  [] 78  Resp:  [16-18] 16  BP: ()/(49-68) 94/64        Physical Exam   Constitutional: Awake, alert, lying curled up in bed holding stomach  Eyes: PERRLA, sclerae anicteric, no conjunctival injection  HENT: NCAT, mucous membranes dry  Neck:  Supple, no thyromegaly, no lymphadenopathy, trachea midline  Respiratory: Clear to auscultation bilaterally, nonlabored respirations   Cardiovascular: RRR, no murmurs appreciated, palpable pedal pulses bilaterally  Gastrointestinal: Positive bowel sounds, soft, mild diffuse tenderness, no distention, no peritoneal signs  Musculoskeletal: No bilateral ankle edema, no clubbing or cyanosis to extremities  Psychiatric: Appropriate affect, cooperative  Neurologic: Oriented x 3, strength symmetric in all extremities, Cranial Nerves grossly intact to confrontation, speech clear  Skin: No rashes    Results Reviewed:  I have personally reviewed current lab, radiology, and data and agree.    Results from last 7 days   Lab Units 04/03/19 2050 04/03/19  1532   WBC 10*3/mm3  --  9.38   HEMOGLOBIN g/dL  --  15.1   HEMOGLOBIN, POC g/dL 13.6  --    HEMATOCRIT %  --  44.3*   HEMATOCRIT POC % 40  --    PLATELETS 10*3/mm3  --  495*     Results from last 7 days   Lab Units 04/03/19 2050 04/03/19  1532   SODIUM mmol/L  --  129*   POTASSIUM mmol/L  --  4.6   CHLORIDE mmol/L  --  93*   CO2 mmol/L  --  17.0*   BUN mg/dL  --  29*   CREATININE mg/dL 0.70 1.41*   GLUCOSE mg/dL  --  334*   CALCIUM mg/dL  --  9.0   ALT (SGPT) U/L  --  89*   AST (SGOT) U/L  --  65*     Estimated Creatinine Clearance: 132.5 mL/min (by C-G formula based on SCr of 0.7 mg/dL).  Brief Urine Lab Results  (Last result in the past 365 days)      Color   Clarity   Blood   Leuk Est   Nitrite   Protein   CREAT   Urine HCG        04/03/19 1601               Negative         No results found for: BNP  Imaging Results (last 24 hours)     Procedure Component Value Units Date/Time    CT Abdomen Pelvis Without Contrast [[id number]] Collected:  04/03/19 1942     Updated:  04/03/19 1945    Narrative:       CT Abdomen Pelvis WO    INDICATION:   Nausea and fatigue, chills and abdominal cramping    TECHNIQUE:   CT of the abdomen and pelvis without contrast. Coronal and sagittal  reconstructions were obtained.  Radiation dose reduction techniques included automated exposure control or exposure modulation based on body size. Radiation audit for number of CT and  nuclear cardiology exams performed in the last year: 0.      COMPARISON:   None available.    FINDINGS:    Visualized lung bases are unremarkable.    Abdomen: The liver and gallbladder are normal. The spleen and pancreas and kidneys and adrenal glands are normal. The aorta is normal in caliber. There is no bowel obstruction, or abdominal or retroperitoneal mass or adenopathy.    Pelvis:  The appendix is normal. There is no pelvic mass, adenopathy, inflammatory change or abnormal fluid collection. There is no hernia or bowel obstruction.    There are some spinal degenerative change but no acute bony abnormality.      Impression:       No acute abnormality. No renal or bowel or biliary obstruction. Normal appendix.      Signer Name: Ronald Benson MD   Signed: 4/3/2019 7:42 PM   Workstation Name: [id number]                Assessment/Plan   Assessment / Plan     Active Hospital Problems    Diagnosis POA   • **DKA (diabetic ketoacidoses) (CMS/HCC) [E13.10] Unknown   • Elevated transaminase level [R74.0] Unknown   • Acute kidney injury (CMS/HCC) [N17.9] Yes   • Anxiety and depression [F41.9, F32.9] Yes   • hx of Graves disease, now hypothyroid [E05.00] Yes     Diabetic Ketoacidosis, Anion Gap 19  Type 1 Diabetes Mellitus, Insulin Pump   -Glucose trending down, currently 183  -Given 2L of NS in ED  -Plan to give 1L of NS followed by 0.45 NS + D5W 100 mL/hr + Potassium 30 mEq until gap is closed   -Current potassium 4.1, continue to monitor  -Insulin drip  -BMP every 4 hours  -Lactic acid  -Influenza PCR and Ag  -CXR   -Hemoglobin A1c  -Pain control  -Diabetes educator  -CM consult    Elevated Liver Enzymes  -Liver ultrasound   -Recheck CMP in AM    Acute Kidney Injury  -Creatinine 1.41  -Recheck BMP   -Likely due to  dehydration  -Continue fluids  -Avoid nephrotoxins, holding home lisinopril    Hypothyroidism   -Continue Synthroid  -Check TSH in a.m.    Anxiety/Depression  -Continue home meds    Thrombocytosis  -Check a.m. labs  -Likely inflammatory marker from current illness  -Consider outpatient follow-up with hematology    DVT prophylaxis: Lovenox     CODE STATUS:    Code Status and Medical Interventions:   Ordered at: 04/03/19 7083     Level Of Support Discussed With:    Patient     Code Status:    CPR     Medical Interventions (Level of Support Prior to Arrest):    Full     Admission Status:  I believe this patient meets INPATIENT status due to the need for care which can only be reasonably provided in an hospital setting such as possible need for aggressive/expedited ancillary services and/or consultation services, IV medications, close physician monitoring, and/or procedures.  In such, I feel patient’s risk for adverse outcomes and need for care warrant INPATIENT evaluation and predict the patient’s care encounter to likely last beyond 2 midnights.    Electronically signed by Claudia Lucas PA-C, 04/03/19, 9:32 PM.      Brief Attending Admission Attestation     I have seen and examined the patient, performing an independent face-to-face diagnostic evaluation with plan of care reviewed and developed with the advanced practice clinician (APC).      Brief Summary Statement:   Marisol Cuellar is a 33 y.o. female the past medical history significant for Graves' disease, type 1 diabetes mellitus on an insulin pump who presents to the ED due to abdominal pain.  Patient states that she ran out of insulin yesterday morning.  Shortly thereafter she reports onset of abdominal pain, cramps and nausea.  Her symptoms persisted today and she was able to go to a relatives house and took 12 units Humalog followed by 10 units Humalog followed by 30 units of Lantus.  Patient denies dysuria, vomiting, fever, cough.  She continues to have  severe leg cramps.  Remainder of detailed HPI is as noted above and has been reviewed and/or edited by me for completeness.      Attending Physical Exam:  Constitutional: Awake, alert, ill-appearing  Eyes: PERRLA, sclerae anicteric, no conjunctival injection  HENT: NCAT, mucous membranes dry  Neck: Supple, no thyromegaly, no lymphadenopathy, trachea midline  Respiratory: Clear to auscultation bilaterally, nonlabored respirations   Cardiovascular: RRR, no murmurs, rubs, or gallops, palpable pedal pulses bilaterally  Gastrointestinal: Positive bowel sounds, soft, nontender, nondistended  Musculoskeletal: No bilateral ankle edema, no clubbing or cyanosis to extremities  Psychiatric: Appropriate affect, cooperative  Neurologic: Oriented x 3, strength symmetric in all extremities, Cranial Nerves grossly intact to confrontation, speech clear  Skin: No rashes        Brief Assessment/Plan :  See above for further detailed assessment and plan developed with APC which I have reviewed and/or edited for completeness.      Electronically signed by Bety Dominguez DO, 04/04/19, 12:32 AM.

## 2019-04-04 NOTE — CONSULTS
Clinical Nutrition   Reason For Visit: Physician consult - nutrition assessment    Patient Name: Marisol Cuellar  YOB: 1985  MRN: [id number]  Date of Encounter: 19 12:19 PM  Admission date: 4/3/2019    EMR weight hx illustrates a weight loss of 30 lbs within the past 8 months, but patient reports she is not aware of any weight loss and does not know why she would have been losing weight. RD unable to verify the weight loss.    RD will order patient Boost Glucose Control daily with lunch when appropriate PO diet ordered.    Patient states she eats froot loops at home and tolerates them even though have contain soy. RD will communicate with kitchen to inform them that patient may have these.      Nutrition Assessment     Admission Problem List:  DKA  JOSE ANTONIO  Nausea  Abdominal pain  +UDS - THC, cocaine      Applicable Nutrition-Related Information:  T1DM with insulin pump      PMH: She  has a past medical history of Anxiety, Chronic low back pain, Depression, Diabetes mellitus (CMS/[facility]), Graves disease, Herniated lumbar intervertebral disc, History of Papanicolaou smear of cervix (?), and IV drug abuse (CMS/[facility]).   PSxH: She  has a past surgical history that includes Foot surgery ();  section; Sinus surgery; Endometrial ablation (); and Tonsillectomy ().        Reported/Observed/Food/Nutrition Related History   Patient reports good appetite/PO intake prior to admission and denies recent unintentional weight loss. States she had standing weight obtained yesterday and it was 163 lbs. I informed patient that per her EMR MD office visit weight hx, it appears she used to weigh 196 lbs 1 year ago. Patient states she is not aware of that and that if she has lost weight she does not know why. Pt began crying and stating she is hungry. Reports food allergies including peanuts, shellfish, soy, and wheat, as well as lactose intolerance. These food allergies are already documented in EMR.  Patient would like a Boost supplement daily as well.      Anthropometrics   Height: 68 in  Weight: 163 lbs (per pt - standing wt 4/3)  BMI: 24.7  BMI classification: Normal: 18.5-24.9kg/m2   UBW: 193 lbs (8/7/18) per EMR MD office visit weight hx  IBW: 140 lbs      Weight change: Possible unintentional weight loss of 30 lbs within the past 8 months (per EMR weight hx). Patient did not verify this.      Labs reviewed   Labs reviewed: Yes    Medications reviewed   Medications reviewed: Yes    Current Nutrition Prescription   PO: NPO Diet    Evaluation of Received Nutrient/Fluid Intake: NPO since admitted yesterday (4/3)      Nutrition Diagnosis     Problem Inadequate oral intake   Etiology Clinical condition, current diet order   Signs/Symptoms NPO       Intervention   Intervention: Follow treatment progress, Care plan reviewed, Await begin PO     RD will order patient Boost Glucose Control daily with lunch when appropriate PO diet ordered      Goal:   General: Nutrition support treatment  PO: Initiate diet as medically appropriate      Monitoring/Evaluation:       Monitoring/Evaluation: Per protocol, Weight  Will Continue to follow per protocol  Miriam Duran RD  Time Spent: 30 min

## 2019-04-04 NOTE — CONSULTS
"Diabetes Education  Assessment/Teaching    Patient Name:  Marisol Cuellar  YOB: 1985  MRN: [id number]  Admit Date:  4/3/2019      Assessment Date:  4/4/2019    Most Recent Value   General Information    Referral From:  A1c, Blood glucose, Other (comment) [use of pump]   Height  172.7 cm (68\")   Height Method  Stated   Weight  74.8 kg (165 lb)   Weight Method  Stated   Pregnancy Assessment   Diabetes History   What type of diabetes do you have?  Type 1   Length of Diabetes Diagnosis  10 + years   Current DM knowledge  good   Have you had diabetes education/teaching in the past?  yes   Education Preferences   Nutrition Information   Assessment Topics   Healthy Eating - Assessment  Needs education   Being Active - Assessment  Needs education   Taking Medication - Assessment  Needs education   Problem Solving - Assessment  Needs education   Reducing Risk - Assessment  Needs education   Healthy Coping - Assessment  Needs education   Monitoring - Assessment  Needs education   DM Goals            Most Recent Value   DM Education Needs   Meter  Has own   Medication  Insulin, Other (comment) [pump]   Problem Solving  Other (comment), Hyperglycemia [DKA]   Discharge Plan  Home   Motivation  Other (comment)   Teaching Method  Explanation   Patient Response  Verbalized understanding            Other Comments:  Marisol said she has had a new Medtronic pump, unknown to her which one, and CGM, unknown to her the name, for about 6 months.  She said she forgot to order her equipment in time so had been using Tresiba 30 units daily and Humalog 1 unit/10 gm CHO, and correction of 1 unit for every 25 mg/dl over 120 mg/dl ac she stated.  We discussed problem solving and how to remember on a regular basis to order. She became in patient with me began crying and complained of hunger and that she was starving. She say she has no one to bring her pump and supplies to her here.        Electronically signed by:  Luciana Baptiste " BRODIE Katz  04/04/19 1:32 PM

## 2019-04-05 VITALS
HEIGHT: 68 IN | WEIGHT: 165 LBS | BODY MASS INDEX: 25.01 KG/M2 | SYSTOLIC BLOOD PRESSURE: 118 MMHG | DIASTOLIC BLOOD PRESSURE: 74 MMHG | RESPIRATION RATE: 18 BRPM | HEART RATE: 68 BPM | OXYGEN SATURATION: 95 % | TEMPERATURE: 97.7 F

## 2019-04-05 PROBLEM — D75.839 THROMBOCYTOSIS: Status: RESOLVED | Noted: 2019-04-04 | Resolved: 2019-04-05

## 2019-04-05 PROBLEM — E11.10 DKA (DIABETIC KETOACIDOSES): Status: RESOLVED | Noted: 2019-04-03 | Resolved: 2019-04-05

## 2019-04-05 PROBLEM — N17.9 ACUTE KIDNEY INJURY: Status: RESOLVED | Noted: 2019-04-03 | Resolved: 2019-04-05

## 2019-04-05 LAB
ALBUMIN SERPL-MCNC: 3 G/DL (ref 3.2–4.8)
ALBUMIN/GLOB SERPL: 1.5 G/DL (ref 1.5–2.5)
ALP SERPL-CCNC: 72 U/L (ref 25–100)
ALT SERPL W P-5'-P-CCNC: 83 U/L (ref 7–40)
ANION GAP SERPL CALCULATED.3IONS-SCNC: 5 MMOL/L (ref 3–11)
AST SERPL-CCNC: 109 U/L (ref 0–33)
BILIRUB SERPL-MCNC: 0.4 MG/DL (ref 0.3–1.2)
BUN BLD-MCNC: 10 MG/DL (ref 9–23)
BUN/CREAT SERPL: 12.8 (ref 7–25)
CALCIUM SPEC-SCNC: 7.7 MG/DL (ref 8.7–10.4)
CHLORIDE SERPL-SCNC: 105 MMOL/L (ref 99–109)
CO2 SERPL-SCNC: 23 MMOL/L (ref 20–31)
CREAT BLD-MCNC: 0.78 MG/DL (ref 0.6–1.3)
GFR SERPL CREATININE-BSD FRML MDRD: 84 ML/MIN/1.73
GLOBULIN UR ELPH-MCNC: 2 GM/DL
GLUCOSE BLD-MCNC: 246 MG/DL (ref 70–100)
GLUCOSE BLDC GLUCOMTR-MCNC: 208 MG/DL (ref 70–130)
GLUCOSE BLDC GLUCOMTR-MCNC: 267 MG/DL (ref 70–130)
GLUCOSE BLDC GLUCOMTR-MCNC: 271 MG/DL (ref 70–130)
GLUCOSE BLDC GLUCOMTR-MCNC: 398 MG/DL (ref 70–130)
MAGNESIUM SERPL-MCNC: 2.3 MG/DL (ref 1.3–2.7)
POTASSIUM BLD-SCNC: 4.2 MMOL/L (ref 3.5–5.5)
PROT SERPL-MCNC: 5 G/DL (ref 5.7–8.2)
SODIUM BLD-SCNC: 133 MMOL/L (ref 132–146)

## 2019-04-05 PROCEDURE — 80053 COMPREHEN METABOLIC PANEL: CPT | Performed by: INTERNAL MEDICINE

## 2019-04-05 PROCEDURE — 83735 ASSAY OF MAGNESIUM: CPT | Performed by: INTERNAL MEDICINE

## 2019-04-05 PROCEDURE — 82962 GLUCOSE BLOOD TEST: CPT

## 2019-04-05 PROCEDURE — 63710000001 INSULIN DETEMIR PER 5 UNITS: Performed by: INTERNAL MEDICINE

## 2019-04-05 PROCEDURE — 25010000002 ENOXAPARIN PER 10 MG: Performed by: PHYSICIAN ASSISTANT

## 2019-04-05 PROCEDURE — 99239 HOSP IP/OBS DSCHRG MGMT >30: CPT | Performed by: INTERNAL MEDICINE

## 2019-04-05 PROCEDURE — 25810000003 SODIUM CHLORIDE 0.9 % WITH KCL 20 MEQ 20-0.9 MEQ/L-% SOLUTION: Performed by: INTERNAL MEDICINE

## 2019-04-05 RX ADMIN — FAMOTIDINE 20 MG: 20 TABLET ORAL at 08:59

## 2019-04-05 RX ADMIN — INSULIN DETEMIR 20 UNITS: 100 INJECTION, SOLUTION SUBCUTANEOUS at 09:00

## 2019-04-05 RX ADMIN — ACETAMINOPHEN 325 MG: 325 TABLET, FILM COATED ORAL at 09:05

## 2019-04-05 RX ADMIN — ENOXAPARIN SODIUM 40 MG: 100 INJECTION SUBCUTANEOUS at 09:00

## 2019-04-05 RX ADMIN — INSULIN LISPRO 6 UNITS: 100 INJECTION, SOLUTION INTRAVENOUS; SUBCUTANEOUS at 09:00

## 2019-04-05 RX ADMIN — LEVOTHYROXINE SODIUM 300 MCG: 150 TABLET ORAL at 05:05

## 2019-04-05 RX ADMIN — INSULIN LISPRO 8 UNITS: 100 INJECTION, SOLUTION INTRAVENOUS; SUBCUTANEOUS at 12:02

## 2019-04-05 RX ADMIN — POTASSIUM CHLORIDE AND SODIUM CHLORIDE 125 ML/HR: 900; 150 INJECTION, SOLUTION INTRAVENOUS at 09:01

## 2019-04-05 NOTE — PROGRESS NOTES
Continued Stay Note   Claudia     Patient Name: Marisol Cuellar  MRN: [id number]  Today's Date: 4/5/2019    Admit Date: 4/3/2019    Discharge Plan     Row Name 04/05/19 1140       Plan    Plan  [facility] follow up.    Patient/Family in Agreement with Plan  yes    Plan Comments  [facility] met with Pt. at bed side. SW discussed Pt. substance abuse and offered related resources. Pt. declined substance abuse resources offered by [facility]. Pt. stated she will have transportation at [facility] today.      Final Discharge Disposition Code  01 - home or self-care        Discharge Codes    No documentation.       Expected Discharge Date and Time     Expected Discharge Date Expected Discharge Time    Apr 5, 2019             SELINA Webster

## 2019-04-05 NOTE — DISCHARGE SUMMARY
[facility] Medicine Services  DISCHARGE SUMMARY    Patient Name: Marisol Cuellar  : 1985  MRN: [id number]    Date of Admission: 4/3/2019  Date of Discharge:  19  Primary Care Physician: Edgar, No Known    Consults     No orders found from 3/5/2019 to 2019.          Hospital Course     Presenting Problem:   Diabetic ketoacidosis without coma associated with other specified diabetes mellitus (CMS/HCC) [E13.10]  Diabetic ketoacidosis without coma associated with other specified diabetes mellitus (CMS/HCC) [E13.10]    Active Hospital Problems    Diagnosis  POA   • Elevated transaminase level [R74.0]  Yes   • Anxiety and depression [F41.9, F32.9]  Yes   • hx of Graves disease, now hypothyroid [E05.00]  Yes      Resolved Hospital Problems    Diagnosis Date Resolved POA   • **DKA (diabetic ketoacidoses) (CMS/HCC) [E13.10] 2019 Unknown   • Thrombocytosis (CMS/HCC) [D47.3] 2019 Unknown   • Acute kidney injury (CMS/HCC) [N17.9] 2019 Yes          Hospital Course:  Marisol Cuellar is a 33 y.o. female with medical history significant for Type 1 DM with insulin pump and Hypothyroidism presented to [facility] ED after running out of insulin pump supplies and test strips on Tuesday morning 19.  She reports onset of dizziness, stomach cramps, and leg pain Tuesday afternoon.  She states she was unable to get to her mother's house for supplies because she felt so sick, therefore she took 10 units of NovoLog insulin from a family member, and took another 12 units of NovoLog at her mother's house, as well as 38 units of Lantus.  She also endorses some SOB but denies fever, chills, cough, congestion.     DKA  - started on insulin drip and IV fluids per protocol   - anion gap closed, patient transitioned to basal bolus therapy with moderate but stable glucose control.  - on the day of discharge, patient tells me she is eating well, no nausea, feels ready to go home     DM Type  "1  - poor control, a1c > 11  - diabetes education provided  - patient tells me her sugars have been high for the past two days because she forgot to reorder her insulin supplies for her pump, or test strips, but states all supplies now have arrived via mail order at her home.   - Ms Cuellar did not bring her insulin pump to the Emergency Room. When I asked if her roommate can bring her pump to the hospital so that we can review the settings, Ms Cuellar states that her roommate \"would not understand it\" and declined to ask him to bring the pump here.  - We did discuss her prior insulin use prior to getting the pump (she states she was on 75/25, as is reflected in our prior admission records, as well as lantus/humalog in the past).   - I placed a call to the patient's PCP (Abraham Brown PA-C) to coordinate transition to outpatient care, as I think she will need close followup, but unfortunately I was not able to reach this provider. We will arrange for followup in clinic next week.      Substance abuse  - UDS positive for cocaine. Patient denied substance abuse when I asked, but does have a prior history of IVDA  - social work consult for rehab options     Elevated LFTs  - hepatic injury pattern, mild  - acute hepatitis panel negative  - Liver ultrasound unremarkable  - discussed with patient, advised to avoid hepatotoxins (including alcohol)  - follow up with PCP next week, suggest repeating CMP at that time. If LFTs still elevated, could consider further testing for hepatitis, such as Hepatits B PCR (as patient could theoretically be in the \"window period\" for this infection).     JOSE ANTONIO  - improved     Hypothyroid  - elevated TSH  - suspect non-compliance  - will need repeat TSH testing in 4-6 weeks with PCP     Anxiety/Depression     Thrombocytosis  - resolved                  Discharge Follow Up Recommendations for labs/diagnostics:   1) repeat CMP at PCP followup -- if LFTs remain elevated consider further " hepatitis testing, such as Hepatitis B PCR.   2) repeat TSH in 4-6 weeks    Day of Discharge     HPI:   Eating well. Denies abdominal pain. Tells me again that she has all required diabetic supplies at home, including insulin and test strips.     Review of Systems  Gen- No fevers, chills  CV- No chest pain, palpitations  Resp- No cough, dyspnea  GI- No N/V/D, abd pain      Otherwise ROS is negative except as mentioned in the HPI.    Vital Signs:   Temp:  [97.7 °F (36.5 °C)-98.5 °F (36.9 °C)] 97.7 °F (36.5 °C)  Heart Rate:  [59-68] 68  Resp:  [18-20] 18  BP: (100-118)/(71-74) 118/74     Physical Exam:  Constitutional -no acute distress, non toxic, somnolent, in bed, awakens easily  HEENT-NCAT, mucous membranes moist  CV-RRR, S1 S2 normal, no m/r/g  Resp-CTAB, no wheezes, rhonchi or rales  Abd-soft, non-tender, non-distended, normo active bowel sounds  Ext-No lower extremity cyanosis, clubbing or edema bilaterally  Neuro-alert and oriented, speech clear, moves all extremities   Psych-normal affect   Skin- No rash on exposed UE or LE bilaterally      Pertinent  and/or Most Recent Results     Results from last 7 days   Lab Units 04/05/19  0504 04/04/19  0757 04/04/19  0620 04/04/19  0017 04/03/19  2050 04/03/19  1532   WBC 10*3/mm3  --   --  5.83  --   --  9.38   HEMOGLOBIN g/dL  --   --  12.4  --   --  15.1   HEMOGLOBIN, POC g/dL  --   --   --   --  13.6  --    HEMATOCRIT %  --   --  36.5  --   --  44.3*   HEMATOCRIT POC %  --   --   --   --  40  --    PLATELETS 10*3/mm3  --   --  363  --   --  495*   SODIUM mmol/L 133 138 137 135  --  129*   POTASSIUM mmol/L 4.2 3.3* 3.2* 3.9  --  4.6   CHLORIDE mmol/L 105 108 107 102  --  93*   CO2 mmol/L 23.0 22.0 24.0 19.0*  --  17.0*   BUN mg/dL 10 18 19 21  --  29*   CREATININE mg/dL 0.78 0.90 0.91 1.01 0.70 1.41*   GLUCOSE mg/dL 246* 133* 155* 239*  --  334*   CALCIUM mg/dL 7.7* 7.7* 7.6* 7.6*  --  9.0     Results from last 7 days   Lab Units 04/05/19  0504 04/04/19  0620  04/03/19  1532   BILIRUBIN mg/dL 0.4 0.5 0.6   ALK PHOS U/L 72 76 111*   ALT (SGPT) U/L 83* 62* 89*   AST (SGOT) U/L 109* 44* 65*           Invalid input(s): TG, LDLCALC, LDLREALC  Results from last 7 days   Lab Units 04/04/19  0620   TSH mIU/mL 18.927*   HEMOGLOBIN A1C % 11.30*       Brief Urine Lab Results  (Last result in the past 365 days)      Color   Clarity   Blood   Leuk Est   Nitrite   Protein   CREAT   Urine HCG        04/03/19 1601               Negative           Microbiology Results Abnormal     Procedure Component Value - Date/Time    Influenza A & B, RT PCR - Swab, Nasopharynx [[id number]]  (Normal) Collected:  04/04/19 0108    Lab Status:  Final result Specimen:  Swab from Nasopharynx Updated:  04/04/19 0719     Influenza A PCR Not Detected     Influenza B PCR Not Detected          Imaging Results (all)     Procedure Component Value Units Date/Time    US Liver [[id number]] Collected:  04/04/19 1317     Updated:  04/04/19 1846    Narrative:       EXAMINATION: US LIVER- 04/04/2019      INDICATION: elevated LFTs; E13.10-Other specified diabetes mellitus with  ketoacidosis without coma; R10.9-Unspecified abdominal pain      TECHNIQUE: Ultrasound right upper quadrant abdomen and liver     COMPARISON: NONE     FINDINGS:      Visualized portions of the pancreas within normal limits.  Liver demonstrates normal echogenicity and echotexture without focal  lesion.  Gallbladder is present without evidence of cholelithiasis, gallbladder  wall thickening or pericholecystic fluid.  Common bile duct measures 6 mm in diameter at the level of the flex  hepatis within normal limits.     Right kidney measures 12.1 cm in length without evidence of  hydronephrosis, contour deforming mass or obvious calculi.       Impression:       No acute sonographic abnormality within the visualized right  upper quadrant. Liver is homogeneous without parenchymal lesion. No  ascites.      D:  04/04/2019  E:  04/04/2019     This report was  finalized on 4/4/2019 6:43 PM by Dr. Joel Rios.       XR Chest 1 View [[id number]] Collected:  04/04/19 0335     Updated:  04/04/19 0338    Narrative:       CR Chest 1 Vw    INDICATION:   Shortness of breath earlier in the evening     COMPARISON:    6-17    FINDINGS:  Single portable AP view of the chest.    Support lines and tubes are unchanged.    The heart and mediastinal contours are normal. The lungs are clear. No pneumothorax or pleural effusion.       Impression:       No acute findings.    Signer Name: Cosme Diallo MD   Signed: 4/4/2019 3:35 AM   Workstation Name: [id number]       CT Abdomen Pelvis Without Contrast [[id number]] Collected:  04/03/19 1942     Updated:  04/03/19 1945    Narrative:       CT Abdomen Pelvis WO    INDICATION:   Nausea and fatigue, chills and abdominal cramping    TECHNIQUE:   CT of the abdomen and pelvis without contrast. Coronal and sagittal reconstructions were obtained.  Radiation dose reduction techniques included automated exposure control or exposure modulation based on body size. Radiation audit for number of CT and  nuclear cardiology exams performed in the last year: 0.      COMPARISON:   None available.    FINDINGS:    Visualized lung bases are unremarkable.    Abdomen: The liver and gallbladder are normal. The spleen and pancreas and kidneys and adrenal glands are normal. The aorta is normal in caliber. There is no bowel obstruction, or abdominal or retroperitoneal mass or adenopathy.    Pelvis:  The appendix is normal. There is no pelvic mass, adenopathy, inflammatory change or abnormal fluid collection. There is no hernia or bowel obstruction.    There are some spinal degenerative change but no acute bony abnormality.      Impression:       No acute abnormality. No renal or bowel or biliary obstruction. Normal appendix.      Signer Name: Ronald Benson MD   Signed: 4/3/2019 7:42 PM   Workstation Name: [id number]                              Discharge Details         Discharge Medications      Continue These Medications      Instructions Start Date   B-D ULTRAFINE III SHORT PEN 31G X 8 MM misc  Generic drug:  Insulin Pen Needle   USE UTD WITH TRESIBA      buPROPion  MG 12 hr tablet  Commonly known as:  WELLBUTRIN SR   1 po qd      cetirizine 10 MG tablet  Commonly known as:  zyrTEC   10 mg, Oral, Nightly      glucose blood test strip  Commonly known as:  ARUN CONTOUR TEST   Use QID      insulin lispro 100 UNIT/ML injection  Commonly known as:  HUMALOG   Take up to total daily dose 50U daily      levothyroxine 300 MCG tablet  Commonly known as:  SYNTHROID, LEVOTHROID   300 mcg, Oral, Daily         Stop These Medications    Insulin Degludec 200 UNIT/ML solution pen-injector  Commonly known as:  TRESIBA FLEXTOUCH            Allergies   Allergen Reactions   • Sulfa Antibiotics Anaphylaxis   • Peanut-Containing Drug Products Other (See Comments)     Coughing and tightness in chest   • Shellfish-Derived Products Other (See Comments)     Itchy throat and tightness   • Dairy Aid [Lactase] Rash     migraines   • Eggs Or Egg-Derived Products Rash     migraines   • Soybean-Containing Drug Products Rash     migraines   • Wheat Extract Rash     migraines         Discharge Disposition:  Home or Self Care    Discharge Diet:  Diet Order   Procedures   • Diet Regular; Consistent Carbohydrate         Discharge Activity:         CODE STATUS:    Code Status and Medical Interventions:   Ordered at: 04/03/19 3520     Level Of Support Discussed With:    Patient     Code Status:    CPR     Medical Interventions (Level of Support Prior to Arrest):    Full         No future appointments.    Additional Instructions for the Follow-ups that You Need to Schedule     Discharge Follow-up with PCP   As directed       Currently Documented PCP:    Provider, No Known    PCP Phone Number:    None     Follow Up Details:  follow up PCP next week, with labs (CMP)               Time Spent on Discharge:   45 minutes    Electronically signed by Marcos Burrows MD, 04/05/19, 11:27 AM.

## 2019-04-06 ENCOUNTER — READMISSION MANAGEMENT (OUTPATIENT)
Dept: CALL CENTER | Facility: HOSPITAL | Age: 34
End: 2019-04-06

## 2019-04-08 NOTE — PAYOR COMM NOTE
"Marisol Adorno (33 y.o. Female)     Date of Birth Social Security Number Address [place] Phone MRN    1985  [id number] [facility] [phone] [id number]    Syn[employer] Marital Status          Quaker Single       Admission Date Admission Type Admitting Provider Attending Provider Department, Room/Bed    4/3/19 Emergency Marcos Burrows MD  [facility] 5G, S560/1    Discharge Date Discharge Disposition Discharge Destination        2019 Home or Self Care              Attending Provider:  (none)   Allergies:  Sulfa Antibiotics, Peanut-containing Drug Products, Shellfish-derived Products, Dairy Aid [Lactase], Eggs Or Egg-derived Products, Soybean-containing Drug Products, Wheat Extract    Isolation:  None   Infection:  None   Code Status:  Prior    Ht:  172.7 cm (68\")   Wt:  74.8 kg (165 lb)    Admission Cmt:  None   Principal Problem:  DKA (diabetic ketoacidoses) (CMS/[facility]) [E13.10]                 Active Insurance as of 4/3/2019     Primary Coverage     Payor Plan Insurance Group Employer/Plan Group    PASSPORT PASSPORT MEDICAID     Payor Plan Address Payor Plan Phone Number Payor Plan Fax Number Effective Dates    [address] [phone]  1997 - None Entered    [facility] [id number]       Subscriber Name Subscriber Birth Date Member ID       MARISOL ADORNO 1985 [id number]                 Emergency Contacts      (Rel.) Home Phone Work Phone Mobile Phone    Ayah Stiles (Mother) -- -- [phone]               Discharge Summary      Marcos Burrows MD at 2019 11:27 AM              [facility] Medicine Services  DISCHARGE SUMMARY    Patient Name: Marisol Adorno  : 1985  MRN: [id number]    Date of Admission: 4/3/2019  Date of Discharge:  19  Primary Care Physician: Provider, No Known    Consults     No orders found from 3/5/2019 to 2019.          Hospital Course     Presenting Problem:   Diabetic " ketoacidosis without coma associated with other specified diabetes mellitus (CMS/HCC) [E13.10]  Diabetic ketoacidosis without coma associated with other specified diabetes mellitus (CMS/HCC) [E13.10]    Active Hospital Problems    Diagnosis  POA   • Elevated transaminase level [R74.0]  Yes   • Anxiety and depression [F41.9, F32.9]  Yes   • hx of Graves disease, now hypothyroid [E05.00]  Yes      Resolved Hospital Problems    Diagnosis Date Resolved POA   • **DKA (diabetic ketoacidoses) (CMS/[facility]) [E13.10] 04/05/2019 Unknown   • Thrombocytosis (CMS/[facility]) [D47.3] 04/05/2019 Unknown   • Acute kidney injury (CMS/[facility]) [N17.9] 04/05/2019 Yes          Hospital Course:  Marisol Cuellar is a 33 y.o. female with medical history significant for Type 1 DM with insulin pump and Hypothyroidism presented to [facility] ED after running out of insulin pump supplies and test strips on Tuesday morning 4/2/19.  She reports onset of dizziness, stomach cramps, and leg pain Tuesday afternoon.  She states she was unable to get to her mother's house for supplies because she felt so sick, therefore she took 10 units of NovoLog insulin from a family member, and took another 12 units of NovoLog at her mother's house, as well as 38 units of Lantus.  She also endorses some SOB but denies fever, chills, cough, congestion.     DKA  - started on insulin drip and IV fluids per protocol   - anion gap closed, patient transitioned to basal bolus therapy with moderate but stable glucose control.  - on the day of discharge, patient tells me she is eating well, no nausea, feels ready to go home     DM Type 1  - poor control, a1c > 11  - diabetes education provided  - patient tells me her sugars have been high for the past two days because she forgot to reorder her insulin supplies for her pump, or test strips, but states all supplies now have arrived via mail order at her home.   - Ms Cuellar did not bring her insulin pump to the Emergency Room. When I asked if  "her roommate can bring her pump to the hospital so that we can review the settings, Ms Cuellar states that her roommate \"would not understand it\" and declined to ask him to bring the pump here.  - We did discuss her prior insulin use prior to getting the pump (she states she was on 75/25, as is reflected in our prior admission records, as well as lantus/humalog in the past).   - I placed a call to the patient's PCP (Abraham Brown PA-C) to coordinate transition to outpatient care, as I think she will need close followup, but unfortunately I was not able to reach this provider. We will arrange for followup in clinic next week.      Substance abuse  - UDS positive for cocaine. Patient denied substance abuse when I asked, but does have a prior history of IVDA  - social work consult for rehab options     Elevated LFTs  - hepatic injury pattern, mild  - acute hepatitis panel negative  - Liver ultrasound unremarkable  - discussed with patient, advised to avoid hepatotoxins (including alcohol)  - follow up with PCP next week, suggest repeating CMP at that time. If LFTs still elevated, could consider further testing for hepatitis, such as Hepatits B PCR (as patient could theoretically be in the \"window period\" for this infection).     JOSE ANTONIO  - improved     Hypothyroid  - elevated TSH  - suspect non-compliance  - will need repeat TSH testing in 4-6 weeks with PCP     Anxiety/Depression     Thrombocytosis  - resolved                  Discharge Follow Up Recommendations for labs/diagnostics:   1) repeat CMP at PCP followup -- if LFTs remain elevated consider further hepatitis testing, such as Hepatitis B PCR.   2) repeat TSH in 4-6 weeks    Day of Discharge     HPI:   Eating well. Denies abdominal pain. Tells me again that she has all required diabetic supplies at home, including insulin and test strips.     Review of Systems  Gen- No fevers, chills  CV- No chest pain, palpitations  Resp- No cough, dyspnea  GI- No N/V/D, abd " pain      Otherwise ROS is negative except as mentioned in the HPI.    Vital Signs:   Temp:  [97.7 °F (36.5 °C)-98.5 °F (36.9 °C)] 97.7 °F (36.5 °C)  Heart Rate:  [59-68] 68  Resp:  [18-20] 18  BP: (100-118)/(71-74) 118/74     Physical Exam:  Constitutional -no acute distress, non toxic, somnolent, in bed, awakens easily  HEENT-NCAT, mucous membranes moist  CV-RRR, S1 S2 normal, no m/r/g  Resp-CTAB, no wheezes, rhonchi or rales  Abd-soft, non-tender, non-distended, normo active bowel sounds  Ext-No lower extremity cyanosis, clubbing or edema bilaterally  Neuro-alert and oriented, speech clear, moves all extremities   Psych-normal affect   Skin- No rash on exposed UE or LE bilaterally      Pertinent  and/or Most Recent Results     Results from last 7 days   Lab Units 04/05/19  0504 04/04/19  0757 04/04/19  0620 04/04/19  0017 04/03/19  2050 04/03/19  1532   WBC 10*3/mm3  --   --  5.83  --   --  9.38   HEMOGLOBIN g/dL  --   --  12.4  --   --  15.1   HEMOGLOBIN, POC g/dL  --   --   --   --  13.6  --    HEMATOCRIT %  --   --  36.5  --   --  44.3*   HEMATOCRIT POC %  --   --   --   --  40  --    PLATELETS 10*3/mm3  --   --  363  --   --  495*   SODIUM mmol/L 133 138 137 135  --  129*   POTASSIUM mmol/L 4.2 3.3* 3.2* 3.9  --  4.6   CHLORIDE mmol/L 105 108 107 102  --  93*   CO2 mmol/L 23.0 22.0 24.0 19.0*  --  17.0*   BUN mg/dL 10 18 19 21  --  29*   CREATININE mg/dL 0.78 0.90 0.91 1.01 0.70 1.41*   GLUCOSE mg/dL 246* 133* 155* 239*  --  334*   CALCIUM mg/dL 7.7* 7.7* 7.6* 7.6*  --  9.0     Results from last 7 days   Lab Units 04/05/19  0504 04/04/19  0620 04/03/19  1532   BILIRUBIN mg/dL 0.4 0.5 0.6   ALK PHOS U/L 72 76 111*   ALT (SGPT) U/L 83* 62* 89*   AST (SGOT) U/L 109* 44* 65*           Invalid input(s): TG, LDLCALC, LDLREALC  Results from last 7 days   Lab Units 04/04/19  0620   TSH mIU/mL 18.927*   HEMOGLOBIN A1C % 11.30*       Brief Urine Lab Results  (Last result in the past 365 days)      Color   Clarity    Blood   Leuk Est   Nitrite   Protein   CREAT   Urine HCG        04/03/19 1601               Negative           Microbiology Results Abnormal     Procedure Component Value - Date/Time    Influenza A & B, RT PCR - Swab, Nasopharynx [[id number]]  (Normal) Collected:  04/04/19 0108    Lab Status:  Final result Specimen:  Swab from Nasopharynx Updated:  04/04/19 0719     Influenza A PCR Not Detected     Influenza B PCR Not Detected          Imaging Results (all)     Procedure Component Value Units Date/Time    US Liver [[id number]] Collected:  04/04/19 1317     Updated:  04/04/19 1846    Narrative:       EXAMINATION: US LIVER- 04/04/2019      INDICATION: elevated LFTs; E13.10-Other specified diabetes mellitus with  ketoacidosis without coma; R10.9-Unspecified abdominal pain      TECHNIQUE: Ultrasound right upper quadrant abdomen and liver     COMPARISON: NONE     FINDINGS:      Visualized portions of the pancreas within normal limits.  Liver demonstrates normal echogenicity and echotexture without focal  lesion.  Gallbladder is present without evidence of cholelithiasis, gallbladder  wall thickening or pericholecystic fluid.  Common bile duct measures 6 mm in diameter at the level of the flex  hepatis within normal limits.     Right kidney measures 12.1 cm in length without evidence of  hydronephrosis, contour deforming mass or obvious calculi.       Impression:       No acute sonographic abnormality within the visualized right  upper quadrant. Liver is homogeneous without parenchymal lesion. No  ascites.      D:  04/04/2019  E:  04/04/2019     This report was finalized on 4/4/2019 6:43 PM by Dr. Joel Rios.       XR Chest 1 View [[id number]] Collected:  04/04/19 0335     Updated:  04/04/19 0338    Narrative:       CR Chest 1 Vw    INDICATION:   Shortness of breath earlier in the evening     COMPARISON:    6-17    FINDINGS:  Single portable AP view of the chest.    Support lines and tubes are unchanged.    The heart and  mediastinal contours are normal. The lungs are clear. No pneumothorax or pleural effusion.       Impression:       No acute findings.    Signer Name: Cosme Diallo MD   Signed: 4/4/2019 3:35 AM   Workstation Name: [id number]       CT Abdomen Pelvis Without Contrast [[id number]] Collected:  04/03/19 1942     Updated:  04/03/19 1945    Narrative:       CT Abdomen Pelvis WO    INDICATION:   Nausea and fatigue, chills and abdominal cramping    TECHNIQUE:   CT of the abdomen and pelvis without contrast. Coronal and sagittal reconstructions were obtained.  Radiation dose reduction techniques included automated exposure control or exposure modulation based on body size. Radiation audit for number of CT and  nuclear cardiology exams performed in the last year: 0.      COMPARISON:   None available.    FINDINGS:    Visualized lung bases are unremarkable.    Abdomen: The liver and gallbladder are normal. The spleen and pancreas and kidneys and adrenal glands are normal. The aorta is normal in caliber. There is no bowel obstruction, or abdominal or retroperitoneal mass or adenopathy.    Pelvis:  The appendix is normal. There is no pelvic mass, adenopathy, inflammatory change or abnormal fluid collection. There is no hernia or bowel obstruction.    There are some spinal degenerative change but no acute bony abnormality.      Impression:       No acute abnormality. No renal or bowel or biliary obstruction. Normal appendix.      Signer Name: Ronald Benson MD   Signed: 4/3/2019 7:42 PM   Workstation Name: [id number]                              Discharge Details        Discharge Medications      Continue These Medications      Instructions Start Date   B-D ULTRAFINE III SHORT PEN 31G X 8 MM misc  Generic drug:  Insulin Pen Needle   USE UTD WITH TRESIBA      buPROPion  MG 12 hr tablet  Commonly known as:  WELLBUTRIN SR   1 po qd      cetirizine 10 MG tablet  Commonly known as:  zyrTEC   10 mg, Oral, Nightly      glucose  blood test strip  Commonly known as:  ARUN CONTOUR TEST   Use QID      insulin lispro 100 UNIT/ML injection  Commonly known as:  HUMALOG   Take up to total daily dose 50U daily      levothyroxine 300 MCG tablet  Commonly known as:  SYNTHROID, LEVOTHROID   300 mcg, Oral, Daily         Stop These Medications    Insulin Degludec 200 UNIT/ML solution pen-injector  Commonly known as:  TRESIBA FLEXTOUCH            Allergies   Allergen Reactions   • Sulfa Antibiotics Anaphylaxis   • Peanut-Containing Drug Products Other (See Comments)     Coughing and tightness in chest   • Shellfish-Derived Products Other (See Comments)     Itchy throat and tightness   • Dairy Aid [Lactase] Rash     migraines   • Eggs Or Egg-Derived Products Rash     migraines   • Soybean-Containing Drug Products Rash     migraines   • Wheat Extract Rash     migraines         Discharge Disposition:  Home or Self Care    Discharge Diet:  Diet Order   Procedures   • Diet Regular; Consistent Carbohydrate         Discharge Activity:         CODE STATUS:    Code Status and Medical Interventions:   Ordered at: 04/03/19 9070     Level Of Support Discussed With:    Patient     Code Status:    CPR     Medical Interventions (Level of Support Prior to Arrest):    Full         No future appointments.    Additional Instructions for the Follow-ups that You Need to Schedule     Discharge Follow-up with PCP   As directed       Currently Documented PCP:    Provider, No Known    PCP Phone Number:    None     Follow Up Details:  follow up PCP next week, with labs (CMP)               Time Spent on Discharge:  45 minutes    Electronically signed by Marcos Burrows MD, 04/05/19, 11:27 AM.        Electronically signed by Marocs Burrows MD at 4/5/2019 11:48 AM

## 2019-04-09 ENCOUNTER — READMISSION MANAGEMENT (OUTPATIENT)
Dept: CALL CENTER | Facility: HOSPITAL | Age: 34
End: 2019-04-09

## 2019-04-09 NOTE — OUTREACH NOTE
Medical Week 1 Survey      Responses   Facility patient discharged from?  [place]   Does the patient have one of the following disease processes/diagnoses(primary or secondary)?  Other   Is there a successful TCM telephone encounter documented?  No   Week 1 attempt successful?  Yes   Call start time  1119   Call end time  1123   General alerts for this patient  Substance abuse issues.    Discharge diagnosis  DKA (diabetic ketoacidoses)   Does the patient have a primary care provider?   Yes   Does the patient have an appointment with their PCP within 7 days of discharge?  Yes   Has the patient kept scheduled appointments due by today?  No   What is preventing the patient from keeping their appointments?  Doesn't understand importance   Nursing Interventions  Advised to reschedule appointment   Has home health visited the patient within 72 hours of discharge?  N/A   Psychosocial issues?  Yes   Psychosocial comments  Mother reports substance abuse issues as per EHR also. Pt's phone line is out and mother doesnt have the current phone number. Advised mother pt missed appt yesterday and encouraged to have pt reschedule. There was not much else I could offer today except for PRN callback number.   Did the patient receive a copy of their discharge instructions?  Yes   Nursing interventions  Reviewed instructions with patient   Week 1 call completed?  Yes   Revoked  No further contact(revokes)-requires comment   Graduated/Revoked comments  Noncompliance. Substance abuse. Pt unreachable           Umesh Colin RN

## 2019-05-20 ENCOUNTER — HOSPITAL ENCOUNTER (EMERGENCY)
Facility: HOSPITAL | Age: 34
Discharge: HOME | End: 2019-05-21
Attending: EMERGENCY MEDICINE | Admitting: EMERGENCY MEDICINE

## 2019-05-20 ENCOUNTER — APPOINTMENT (OUTPATIENT)
Dept: GENERAL RADIOLOGY | Facility: HOSPITAL | Age: 34
End: 2019-05-20

## 2019-05-20 VITALS
WEIGHT: 160 LBS | TEMPERATURE: 98.6 F | RESPIRATION RATE: 18 BRPM | BODY MASS INDEX: 25.11 KG/M2 | HEART RATE: 84 BPM | OXYGEN SATURATION: 97 % | HEIGHT: 67 IN | SYSTOLIC BLOOD PRESSURE: 117 MMHG | DIASTOLIC BLOOD PRESSURE: 78 MMHG

## 2019-05-20 DIAGNOSIS — E86.0 DEHYDRATION: ICD-10-CM

## 2019-05-20 DIAGNOSIS — F14.10 COCAINE ABUSE (HCC): ICD-10-CM

## 2019-05-20 DIAGNOSIS — M79.10 MYALGIA: ICD-10-CM

## 2019-05-20 DIAGNOSIS — E10.65 HYPERGLYCEMIA DUE TO TYPE 1 DIABETES MELLITUS (HCC): Primary | ICD-10-CM

## 2019-05-20 LAB
ALBUMIN SERPL-MCNC: 4.9 G/DL (ref 3.5–5.2)
ALBUMIN/GLOB SERPL: 1.4 G/DL
ALP SERPL-CCNC: 129 U/L (ref 39–117)
ALT SERPL W P-5'-P-CCNC: 54 U/L (ref 1–33)
AMPHET+METHAMPHET UR QL: NEGATIVE
AMPHETAMINES UR QL: NEGATIVE
ANION GAP SERPL CALCULATED.3IONS-SCNC: 17 MMOL/L
AST SERPL-CCNC: 38 U/L (ref 1–32)
ATMOSPHERIC PRESS: ABNORMAL MMHG
B-HCG UR QL: NEGATIVE
B-OH-BUTYR SERPL-SCNC: 1.35 MMOL/L (ref 0.02–0.27)
BACTERIA UR QL AUTO: ABNORMAL /HPF
BARBITURATES UR QL SCN: NEGATIVE
BASE EXCESS BLDV CALC-SCNC: 0.7 MMOL/L (ref -2–2)
BASOPHILS # BLD AUTO: 0.04 10*3/MM3 (ref 0–0.2)
BASOPHILS NFR BLD AUTO: 0.5 % (ref 0–1.5)
BDY SITE: ABNORMAL
BENZODIAZ UR QL SCN: NEGATIVE
BILIRUB SERPL-MCNC: 1.4 MG/DL (ref 0.2–1.2)
BILIRUB UR QL STRIP: NEGATIVE
BODY TEMPERATURE: 37 C
BUN BLD-MCNC: 24 MG/DL (ref 6–20)
BUN/CREAT SERPL: 27.6 (ref 7–25)
BUPRENORPHINE SERPL-MCNC: NEGATIVE NG/ML
CALCIUM SPEC-SCNC: 10.5 MG/DL (ref 8.6–10.5)
CANNABINOIDS SERPL QL: POSITIVE
CHLORIDE SERPL-SCNC: 93 MMOL/L (ref 98–107)
CK SERPL-CCNC: 32 U/L (ref 20–180)
CLARITY UR: CLEAR
CO2 BLDA-SCNC: 23.2 MMOL/L (ref 23–27)
CO2 SERPL-SCNC: 24 MMOL/L (ref 22–29)
COCAINE UR QL: POSITIVE
COHGB MFR BLD: 1.6 %
COLOR UR: YELLOW
CREAT BLD-MCNC: 0.87 MG/DL (ref 0.57–1)
D-LACTATE SERPL-SCNC: 1.4 MMOL/L (ref 0.5–2)
DEPRECATED RDW RBC AUTO: 50.8 FL (ref 37–54)
EOSINOPHIL # BLD AUTO: 0.06 10*3/MM3 (ref 0–0.4)
EOSINOPHIL NFR BLD AUTO: 0.8 % (ref 0.3–6.2)
ERYTHROCYTE [DISTWIDTH] IN BLOOD BY AUTOMATED COUNT: 14.3 % (ref 12.3–15.4)
ETHANOL BLD-MCNC: <10 MG/DL (ref 0–10)
GFR SERPL CREATININE-BSD FRML MDRD: 74 ML/MIN/1.73
GLOBULIN UR ELPH-MCNC: 3.5 GM/DL
GLUCOSE BLD-MCNC: 342 MG/DL (ref 65–99)
GLUCOSE BLDC GLUCOMTR-MCNC: 261 MG/DL (ref 70–130)
GLUCOSE BLDC GLUCOMTR-MCNC: 333 MG/DL (ref 70–130)
GLUCOSE UR STRIP-MCNC: ABNORMAL MG/DL
HCO3 BLDV-SCNC: 22.4 MMOL/L (ref 22–28)
HCT VFR BLD AUTO: 49 % (ref 34–46.6)
HGB BLD-MCNC: 16.4 G/DL (ref 12–15.9)
HGB BLDA-MCNC: 14.8 G/DL (ref 14–18)
HGB UR QL STRIP.AUTO: NEGATIVE
HOLD SPECIMEN: NORMAL
HOLD SPECIMEN: NORMAL
HOROWITZ INDEX BLD+IHG-RTO: 21 %
HYALINE CASTS UR QL AUTO: ABNORMAL /LPF
IMM GRANULOCYTES # BLD AUTO: 0.01 10*3/MM3 (ref 0–0.05)
IMM GRANULOCYTES NFR BLD AUTO: 0.1 % (ref 0–0.5)
KETONES UR QL STRIP: ABNORMAL
LEUKOCYTE ESTERASE UR QL STRIP.AUTO: NEGATIVE
LYMPHOCYTES # BLD AUTO: 1.68 10*3/MM3 (ref 0.7–3.1)
LYMPHOCYTES NFR BLD AUTO: 21.1 % (ref 19.6–45.3)
Lab: ABNORMAL
MAGNESIUM SERPL-MCNC: 1.9 MG/DL (ref 1.6–2.6)
MCH RBC QN AUTO: 32.3 PG (ref 26.6–33)
MCHC RBC AUTO-ENTMCNC: 33.5 G/DL (ref 31.5–35.7)
MCV RBC AUTO: 96.6 FL (ref 79–97)
METHADONE UR QL SCN: NEGATIVE
METHGB BLD QL: 0.9 %
MODALITY: ABNORMAL
MONOCYTES # BLD AUTO: 0.56 10*3/MM3 (ref 0.1–0.9)
MONOCYTES NFR BLD AUTO: 7 % (ref 5–12)
NEUTROPHILS # BLD AUTO: 5.61 10*3/MM3 (ref 1.7–7)
NEUTROPHILS NFR BLD AUTO: 70.6 % (ref 42.7–76)
NITRITE UR QL STRIP: NEGATIVE
NOTE: ABNORMAL
NOTIFIED BY: ABNORMAL
NOTIFIED WHO: ABNORMAL
OPIATES UR QL: NEGATIVE
OXYCODONE UR QL SCN: NEGATIVE
OXYHGB MFR BLDV: 78.4 %
PCO2 BLDV: 27.7 MM HG (ref 41–51)
PCP UR QL SCN: NEGATIVE
PH BLDV: 7.52 PH UNITS
PH UR STRIP.AUTO: 7.5 [PH] (ref 5–8)
PLATELET # BLD AUTO: 466 10*3/MM3 (ref 140–450)
PMV BLD AUTO: 10 FL (ref 6–12)
PO2 BLDV: 39.7 MM HG (ref 27–53)
POTASSIUM BLD-SCNC: 4.6 MMOL/L (ref 3.5–5.2)
PROPOXYPH UR QL: NEGATIVE
PROT SERPL-MCNC: 8.4 G/DL (ref 6–8.5)
PROT UR QL STRIP: ABNORMAL
RBC # BLD AUTO: 5.07 10*6/MM3 (ref 3.77–5.28)
RBC # UR: ABNORMAL /HPF
REF LAB TEST METHOD: ABNORMAL
SODIUM BLD-SCNC: 134 MMOL/L (ref 136–145)
SP GR UR STRIP: 1.03 (ref 1–1.03)
SQUAMOUS #/AREA URNS HPF: ABNORMAL /HPF
TRICYCLICS UR QL SCN: NEGATIVE
TSH SERPL DL<=0.05 MIU/L-ACNC: 7.82 MIU/ML (ref 0.27–4.2)
UROBILINOGEN UR QL STRIP: ABNORMAL
VENTILATOR MODE: ABNORMAL
WBC NRBC COR # BLD: 7.95 10*3/MM3 (ref 3.4–10.8)
WBC UR QL AUTO: ABNORMAL /HPF
WHOLE BLOOD HOLD SPECIMEN: NORMAL
WHOLE BLOOD HOLD SPECIMEN: NORMAL

## 2019-05-20 PROCEDURE — 85025 COMPLETE CBC W/AUTO DIFF WBC: CPT

## 2019-05-20 PROCEDURE — 82820 HEMOGLOBIN-OXYGEN AFFINITY: CPT

## 2019-05-20 PROCEDURE — 25010000002 ONDANSETRON PER 1 MG: Performed by: EMERGENCY MEDICINE

## 2019-05-20 PROCEDURE — 83735 ASSAY OF MAGNESIUM: CPT | Performed by: EMERGENCY MEDICINE

## 2019-05-20 PROCEDURE — 82962 GLUCOSE BLOOD TEST: CPT

## 2019-05-20 PROCEDURE — 80306 DRUG TEST PRSMV INSTRMNT: CPT | Performed by: EMERGENCY MEDICINE

## 2019-05-20 PROCEDURE — 99284 EMERGENCY DEPT VISIT MOD MDM: CPT

## 2019-05-20 PROCEDURE — 82010 KETONE BODYS QUAN: CPT

## 2019-05-20 PROCEDURE — 80053 COMPREHEN METABOLIC PANEL: CPT

## 2019-05-20 PROCEDURE — 80307 DRUG TEST PRSMV CHEM ANLYZR: CPT | Performed by: EMERGENCY MEDICINE

## 2019-05-20 PROCEDURE — 82805 BLOOD GASES W/O2 SATURATION: CPT

## 2019-05-20 PROCEDURE — 84443 ASSAY THYROID STIM HORMONE: CPT | Performed by: EMERGENCY MEDICINE

## 2019-05-20 PROCEDURE — 83605 ASSAY OF LACTIC ACID: CPT | Performed by: EMERGENCY MEDICINE

## 2019-05-20 PROCEDURE — 25010000002 KETOROLAC TROMETHAMINE PER 15 MG: Performed by: EMERGENCY MEDICINE

## 2019-05-20 PROCEDURE — 63710000001 INSULIN REGULAR HUMAN PER 5 UNITS: Performed by: EMERGENCY MEDICINE

## 2019-05-20 PROCEDURE — 81001 URINALYSIS AUTO W/SCOPE: CPT | Performed by: EMERGENCY MEDICINE

## 2019-05-20 PROCEDURE — 25010000002 PROMETHAZINE PER 50 MG: Performed by: EMERGENCY MEDICINE

## 2019-05-20 PROCEDURE — 96375 TX/PRO/DX INJ NEW DRUG ADDON: CPT

## 2019-05-20 PROCEDURE — 82550 ASSAY OF CK (CPK): CPT | Performed by: EMERGENCY MEDICINE

## 2019-05-20 PROCEDURE — 81025 URINE PREGNANCY TEST: CPT | Performed by: EMERGENCY MEDICINE

## 2019-05-20 PROCEDURE — 71045 X-RAY EXAM CHEST 1 VIEW: CPT

## 2019-05-20 PROCEDURE — 96374 THER/PROPH/DIAG INJ IV PUSH: CPT

## 2019-05-20 RX ORDER — ONDANSETRON 2 MG/ML
4 INJECTION INTRAMUSCULAR; INTRAVENOUS ONCE
Status: COMPLETED | OUTPATIENT
Start: 2019-05-20 | End: 2019-05-20

## 2019-05-20 RX ORDER — SODIUM CHLORIDE 0.9 % (FLUSH) 0.9 %
10 SYRINGE (ML) INJECTION AS NEEDED
Status: DISCONTINUED | OUTPATIENT
Start: 2019-05-20 | End: 2019-05-21 | Stop reason: HOSPADM

## 2019-05-20 RX ORDER — PROMETHAZINE HYDROCHLORIDE 25 MG/ML
12.5 INJECTION, SOLUTION INTRAMUSCULAR; INTRAVENOUS ONCE
Status: COMPLETED | OUTPATIENT
Start: 2019-05-20 | End: 2019-05-20

## 2019-05-20 RX ORDER — KETOROLAC TROMETHAMINE 15 MG/ML
15 INJECTION, SOLUTION INTRAMUSCULAR; INTRAVENOUS ONCE
Status: COMPLETED | OUTPATIENT
Start: 2019-05-20 | End: 2019-05-20

## 2019-05-20 RX ORDER — PROMETHAZINE HYDROCHLORIDE 25 MG/1
25 TABLET ORAL EVERY 6 HOURS PRN
Qty: 12 TABLET | Refills: 0 | OUTPATIENT
Start: 2019-05-20 | End: 2021-01-10

## 2019-05-20 RX ORDER — PROMETHAZINE HYDROCHLORIDE 25 MG/1
25 SUPPOSITORY RECTAL EVERY 6 HOURS PRN
Qty: 12 SUPPOSITORY | Refills: 0 | OUTPATIENT
Start: 2019-05-20 | End: 2021-01-10

## 2019-05-20 RX ADMIN — ONDANSETRON 4 MG: 2 INJECTION INTRAMUSCULAR; INTRAVENOUS at 19:18

## 2019-05-20 RX ADMIN — SODIUM CHLORIDE 1000 ML: 9 INJECTION, SOLUTION INTRAVENOUS at 19:17

## 2019-05-20 RX ADMIN — SODIUM CHLORIDE 1000 ML: 9 INJECTION, SOLUTION INTRAVENOUS at 20:21

## 2019-05-20 RX ADMIN — KETOROLAC TROMETHAMINE 15 MG: 15 INJECTION, SOLUTION INTRAMUSCULAR; INTRAVENOUS at 20:34

## 2019-05-20 RX ADMIN — INSULIN HUMAN 5 UNITS: 100 INJECTION, SOLUTION PARENTERAL at 23:34

## 2019-05-20 RX ADMIN — PROMETHAZINE HYDROCHLORIDE 12.5 MG: 25 INJECTION INTRAMUSCULAR; INTRAVENOUS at 20:15

## 2019-08-07 DIAGNOSIS — E10.65 TYPE 1 DIABETES MELLITUS WITH HYPERGLYCEMIA (HCC): ICD-10-CM

## 2019-10-22 NOTE — OUTREACH NOTE
Prep Survey      Responses   Facility patient discharged from?  [place]   Is patient eligible?  Yes   Discharge diagnosis  DKA (diabetic ketoacidoses)   Does the patient have one of the following disease processes/diagnoses(primary or secondary)?  Other   Does the patient have Home health ordered?  No   Is there a DME ordered?  No   General alerts for this patient  UDS positive for cocaine   Prep survey completed?  Yes          Paige Dee RN         No

## 2019-11-18 NOTE — PLAN OF CARE
Problem: Patient Care Overview  Goal: Plan of Care Review  Outcome: Ongoing (interventions implemented as appropriate)   04/04/19 0222 04/04/19 2000 04/05/19 0258   Coping/Psychosocial   Plan of Care Reviewed With --  patient --    Plan of Care Review   Progress no change --  --    OTHER   Outcome Summary --  --  VSS; no c/c of pain; resting; will continue to monitor       Problem: Diabetes, Type 1 (Adult)  Goal: Signs and Symptoms of Listed Potential Problems Will be Absent, Minimized or Managed (Diabetes, Type 1)  Outcome: Ongoing (interventions implemented as appropriate)   04/05/19 0258   Goal/Outcome Evaluation   Problems Assessed (Type 1 Diabetes) DKA (diabetic ketoacidosis)   Problems Present (Type 1 Diabetes) hyperglycemia          Pt discharged via wheelchair, accompanied by RN. Pt discharged awake and alert, respirations equal and unlabored, skin warm, dry, and intact. Pt and family members' questions and concerns addressed prior to discharge.

## 2019-12-19 DIAGNOSIS — E10.65 TYPE 1 DIABETES MELLITUS WITH HYPERGLYCEMIA (HCC): ICD-10-CM

## 2019-12-19 NOTE — TELEPHONE ENCOUNTER
RADHIKA,     PATIENT IS NEEDING TO MAKE A FOLLOW UP APPT WITH [facility]. SHE CAN BE REACHED [phone]

## 2019-12-19 NOTE — TELEPHONE ENCOUNTER
PATIENT IS NEEDING A REFILL ON HUMALOG. PATIENTS PHARMACY IS [facility]. PATIENT CAN BE REACHED [phone]. SHE IS ALMOST OUT OF THIS MEDICATION

## 2019-12-19 NOTE — TELEPHONE ENCOUNTER
Spoke with pt and scheduled a follow up with Abraham in February. Pt is needing a supply of medication until she is seen. Please send to pharmacy. Thanks!

## 2020-01-14 ENCOUNTER — TELEPHONE (OUTPATIENT)
Dept: ENDOCRINOLOGY | Facility: CLINIC | Age: 35
End: 2020-01-14

## 2020-01-14 NOTE — TELEPHONE ENCOUNTER
Please advise, pt has not been seen since 8/7/2018 and has had several NO Shows. She does have an appointment scheduled for February but I don't even see Lantus on her med list.

## 2020-01-15 NOTE — TELEPHONE ENCOUNTER
Left pt a message asking her to call me to clarify exactly what medication she is taking. I also informed her that this next appointment must be kept for future refills and that she would be dismissed if she ws a NO Show.

## 2020-04-23 ENCOUNTER — TELEPHONE (OUTPATIENT)
Dept: ENDOCRINOLOGY | Facility: CLINIC | Age: 35
End: 2020-04-23

## 2020-04-23 DIAGNOSIS — E10.65 TYPE 1 DIABETES MELLITUS WITH HYPERGLYCEMIA (HCC): ICD-10-CM

## 2020-04-23 NOTE — TELEPHONE ENCOUNTER
Refill for strips, were sent to [facility] pharmacy.    Attempted to contact patient, got a busy tone.

## 2021-01-10 ENCOUNTER — HOSPITAL ENCOUNTER (EMERGENCY)
Facility: HOSPITAL | Age: 36
Discharge: HOME | End: 2021-01-10
Attending: EMERGENCY MEDICINE | Admitting: EMERGENCY MEDICINE

## 2021-01-10 VITALS
OXYGEN SATURATION: 99 % | HEART RATE: 73 BPM | SYSTOLIC BLOOD PRESSURE: 123 MMHG | BODY MASS INDEX: 25.9 KG/M2 | HEIGHT: 67 IN | RESPIRATION RATE: 18 BRPM | DIASTOLIC BLOOD PRESSURE: 79 MMHG | TEMPERATURE: 97.9 F | WEIGHT: 165 LBS

## 2021-01-10 DIAGNOSIS — R73.9 HYPERGLYCEMIA: Primary | ICD-10-CM

## 2021-01-10 DIAGNOSIS — F32.A DEPRESSION, UNSPECIFIED DEPRESSION TYPE: ICD-10-CM

## 2021-01-10 LAB
ALBUMIN SERPL-MCNC: 3.9 G/DL (ref 3.5–5.2)
ALBUMIN/GLOB SERPL: 1.5 G/DL
ALP SERPL-CCNC: 79 U/L (ref 39–117)
ALT SERPL W P-5'-P-CCNC: 14 U/L (ref 1–33)
AMPHET+METHAMPHET UR QL: NEGATIVE
ANION GAP SERPL CALCULATED.3IONS-SCNC: 8.1 MMOL/L (ref 5–15)
AST SERPL-CCNC: 21 U/L (ref 1–32)
BACTERIA UR QL AUTO: ABNORMAL /HPF
BARBITURATES UR QL SCN: NEGATIVE
BASOPHILS # BLD AUTO: 0.04 10*3/MM3 (ref 0–0.2)
BASOPHILS NFR BLD AUTO: 0.5 % (ref 0–1.5)
BENZODIAZ UR QL SCN: NEGATIVE
BILIRUB SERPL-MCNC: 0.5 MG/DL (ref 0–1.2)
BILIRUB UR QL STRIP: NEGATIVE
BUN SERPL-MCNC: 14 MG/DL (ref 6–20)
BUN/CREAT SERPL: 18.4 (ref 7–25)
CALCIUM SPEC-SCNC: 8.6 MG/DL (ref 8.6–10.5)
CANNABINOIDS SERPL QL: NEGATIVE
CHLORIDE SERPL-SCNC: 98 MMOL/L (ref 98–107)
CLARITY UR: CLEAR
CO2 SERPL-SCNC: 25.9 MMOL/L (ref 22–29)
COCAINE UR QL: NEGATIVE
COLOR UR: YELLOW
CREAT SERPL-MCNC: 0.76 MG/DL (ref 0.57–1)
DEPRECATED RDW RBC AUTO: 43 FL (ref 37–54)
EOSINOPHIL # BLD AUTO: 0.06 10*3/MM3 (ref 0–0.4)
EOSINOPHIL NFR BLD AUTO: 0.8 % (ref 0.3–6.2)
ERYTHROCYTE [DISTWIDTH] IN BLOOD BY AUTOMATED COUNT: 12.5 % (ref 12.3–15.4)
ETHANOL BLD-MCNC: <10 MG/DL (ref 0–10)
ETHANOL UR QL: <0.01 %
GFR SERPL CREATININE-BSD FRML MDRD: 86 ML/MIN/1.73
GLOBULIN UR ELPH-MCNC: 2.6 GM/DL
GLUCOSE BLDC GLUCOMTR-MCNC: 297 MG/DL (ref 70–130)
GLUCOSE SERPL-MCNC: 298 MG/DL (ref 65–99)
GLUCOSE UR STRIP-MCNC: ABNORMAL MG/DL
HCG SERPL QL: NEGATIVE
HCT VFR BLD AUTO: 44.8 % (ref 34–46.6)
HGB BLD-MCNC: 14.8 G/DL (ref 12–15.9)
HGB UR QL STRIP.AUTO: ABNORMAL
HOLD SPECIMEN: NORMAL
HYALINE CASTS UR QL AUTO: ABNORMAL /LPF
IMM GRANULOCYTES # BLD AUTO: 0.02 10*3/MM3 (ref 0–0.05)
IMM GRANULOCYTES NFR BLD AUTO: 0.3 % (ref 0–0.5)
KETONES UR QL STRIP: ABNORMAL
LEUKOCYTE ESTERASE UR QL STRIP.AUTO: NEGATIVE
LYMPHOCYTES # BLD AUTO: 1.42 10*3/MM3 (ref 0.7–3.1)
LYMPHOCYTES NFR BLD AUTO: 18.8 % (ref 19.6–45.3)
MCH RBC QN AUTO: 31.2 PG (ref 26.6–33)
MCHC RBC AUTO-ENTMCNC: 33 G/DL (ref 31.5–35.7)
MCV RBC AUTO: 94.5 FL (ref 79–97)
METHADONE UR QL SCN: NEGATIVE
MONOCYTES # BLD AUTO: 0.4 10*3/MM3 (ref 0.1–0.9)
MONOCYTES NFR BLD AUTO: 5.3 % (ref 5–12)
NEUTROPHILS NFR BLD AUTO: 5.62 10*3/MM3 (ref 1.7–7)
NEUTROPHILS NFR BLD AUTO: 74.3 % (ref 42.7–76)
NITRITE UR QL STRIP: NEGATIVE
NRBC BLD AUTO-RTO: 0 /100 WBC (ref 0–0.2)
OPIATES UR QL: NEGATIVE
OXYCODONE UR QL SCN: NEGATIVE
PH UR STRIP.AUTO: 7 [PH] (ref 5–8)
PLATELET # BLD AUTO: 337 10*3/MM3 (ref 140–450)
PMV BLD AUTO: 10 FL (ref 6–12)
POTASSIUM SERPL-SCNC: 4.2 MMOL/L (ref 3.5–5.2)
PROT SERPL-MCNC: 6.5 G/DL (ref 6–8.5)
PROT UR QL STRIP: ABNORMAL
RBC # BLD AUTO: 4.74 10*6/MM3 (ref 3.77–5.28)
RBC # UR: ABNORMAL /HPF
REF LAB TEST METHOD: ABNORMAL
SODIUM SERPL-SCNC: 132 MMOL/L (ref 136–145)
SP GR UR STRIP: >=1.03 (ref 1–1.03)
SQUAMOUS #/AREA URNS HPF: ABNORMAL /HPF
UROBILINOGEN UR QL STRIP: ABNORMAL
WBC # BLD AUTO: 7.56 10*3/MM3 (ref 3.4–10.8)
WBC UR QL AUTO: ABNORMAL /HPF
WHOLE BLOOD HOLD SPECIMEN: NORMAL
WHOLE BLOOD HOLD SPECIMEN: NORMAL

## 2021-01-10 PROCEDURE — 82077 ASSAY SPEC XCP UR&BREATH IA: CPT | Performed by: PHYSICIAN ASSISTANT

## 2021-01-10 PROCEDURE — 80307 DRUG TEST PRSMV CHEM ANLYZR: CPT | Performed by: PHYSICIAN ASSISTANT

## 2021-01-10 PROCEDURE — 90791 PSYCH DIAGNOSTIC EVALUATION: CPT | Performed by: SOCIAL WORKER

## 2021-01-10 PROCEDURE — 82962 GLUCOSE BLOOD TEST: CPT

## 2021-01-10 PROCEDURE — 99283 EMERGENCY DEPT VISIT LOW MDM: CPT

## 2021-01-10 PROCEDURE — 80053 COMPREHEN METABOLIC PANEL: CPT | Performed by: PHYSICIAN ASSISTANT

## 2021-01-10 PROCEDURE — 84703 CHORIONIC GONADOTROPIN ASSAY: CPT | Performed by: PHYSICIAN ASSISTANT

## 2021-01-10 PROCEDURE — 96374 THER/PROPH/DIAG INJ IV PUSH: CPT

## 2021-01-10 PROCEDURE — 85025 COMPLETE CBC W/AUTO DIFF WBC: CPT | Performed by: PHYSICIAN ASSISTANT

## 2021-01-10 PROCEDURE — 81001 URINALYSIS AUTO W/SCOPE: CPT | Performed by: PHYSICIAN ASSISTANT

## 2021-01-10 PROCEDURE — 25010000002 ONDANSETRON PER 1 MG

## 2021-01-10 RX ORDER — NICOTINE 21 MG/24HR
1 PATCH, TRANSDERMAL 24 HOURS TRANSDERMAL ONCE
Status: DISCONTINUED | OUTPATIENT
Start: 2021-01-10 | End: 2021-01-10 | Stop reason: HOSPADM

## 2021-01-10 RX ORDER — SODIUM CHLORIDE 0.9 % (FLUSH) 0.9 %
10 SYRINGE (ML) INJECTION AS NEEDED
Status: DISCONTINUED | OUTPATIENT
Start: 2021-01-10 | End: 2021-01-10 | Stop reason: HOSPADM

## 2021-01-10 RX ORDER — ESCITALOPRAM OXALATE 20 MG/1
20 TABLET ORAL DAILY
COMMUNITY
End: 2022-03-03 | Stop reason: SDUPTHER

## 2021-01-10 RX ORDER — BUSPIRONE HYDROCHLORIDE 15 MG/1
15 TABLET ORAL 2 TIMES DAILY
COMMUNITY

## 2021-01-10 RX ORDER — ONDANSETRON 2 MG/ML
INJECTION INTRAMUSCULAR; INTRAVENOUS
Status: COMPLETED
Start: 2021-01-10 | End: 2021-01-10

## 2021-01-10 RX ORDER — ARIPIPRAZOLE 5 MG/1
5 TABLET ORAL NIGHTLY
COMMUNITY
End: 2021-04-23

## 2021-01-10 RX ORDER — ARSENIC TRIOXIDE 1 MG/ML
INJECTION INTRAVENOUS
COMMUNITY
End: 2021-04-23

## 2021-01-10 RX ORDER — ONDANSETRON 2 MG/ML
4 INJECTION INTRAMUSCULAR; INTRAVENOUS ONCE
Status: COMPLETED | OUTPATIENT
Start: 2021-01-10 | End: 2021-01-10

## 2021-01-10 RX ADMIN — ONDANSETRON 4 MG: 2 INJECTION INTRAMUSCULAR; INTRAVENOUS at 12:06

## 2021-01-10 RX ADMIN — SODIUM CHLORIDE, PRESERVATIVE FREE 10 ML: 5 INJECTION INTRAVENOUS at 11:04

## 2021-01-10 RX ADMIN — SODIUM CHLORIDE 1000 ML: 9 INJECTION, SOLUTION INTRAVENOUS at 11:04

## 2021-01-10 NOTE — CONSULTS
"[facility] evaluated 35-year-old female in the ED for depression.  Patient states she was diagnosed with bipolar depression 3 years ago.  Patient lives in a sober living house and has for the last 7 months.  Patient's been sober from IV drug use for 8 months.  Patient states she completed a in-house IOP program at her sober living house fairly recently.  Patient talks to Dr. Isidro who is an LCSW and owns the sober living house.  Patient states she talks to her every other week.  Patient states she gets her medications from [facility] and states she has an appointment on the 20th to have her medicines looked at.  Patient states she has not had her meds rechecked in the last 2 months.  Patient states her increased depression started in the last couple of weeks and she believes it is due to poor sleep.  Patient states she has had difficult sleep in the last month.  Patient states she sometimes wishes she were not alive but has no plan or intent to harm herself.  Patient also denies any history of attempts to harm herself.  Patient was a cutter years ago and states she has not cut on herself in 10 years but recently has had some thoughts to do that, though not in a suicidal way.  Patient also states her appetite has decreased in the past 2 weeks.  Patient was diagnosed with diabetes at age 9 and due to her depression has been neglecting her diabetic needs.  Patient came in with very high sugars to the ED.  Patient rates her current anxiety as a \"7\" and her current depression as a \"10\".  Patient has support from the sober living therapist and states her mom is also supportive.  Patient states she is active in AA and attends 5 times a week.  Patient states she does have a sponsor.    Patient states she takes 20 mg of Lexapro, 15 mg of BuSpar 2 times a day, 5 mg of Abilify at night and 200 mg of trazodone at night.  Patient also states she takes melatonin.    Patient lives in a sober living house.  " Patient is  and has 2 children ages, 15 and 16 who live in [place] with their father.  Patient is not working.    Access consulted with psychiatrist who stated patient does not meet criteria for psychiatric admission but suggested she might want to look into the psychiatric IOP programs available.  Access talk to patient about the possibility of IOP programs and patient replied that she had already been thru an IOP program in sober living.  Patient was given some resources for IOP in case patient decides to pursue that.  Patient agreed to talk to Dr. Huerta to see if she can talk to her in therapy weekly instead of every other week.  Access also suggested patient get her appointment at [facility] moved up from the 20th to a sooner appointment.  Patient agreed to explore both those possibilities.  Access also talked to patient about the suicide hotline as a tool if she ever felt like her thoughts were moving towards a plan or intent.  Patient agreed.

## 2021-01-10 NOTE — ED PROVIDER NOTES
" EMERGENCY DEPARTMENT ENCOUNTER    Room Number:  24/24  Date of encounter:  1/10/2021  PCP: Mindy Andrews APRN  Historian: Patient      I used full protective equipment while examining this patient.  This includes face mask, gloves and protective eyewear.  I washed my hands before entering the room and immediately upon leaving the room      HPI:  Chief Complaint: Hypoglycemia, depression  A complete HPI/ROS/PMH/PSH/SH/FH are unobtainable due to: Nothing    Context: Marisol Cuellar is a 35 y.o. female who presents to the ED c/o acute on chronic depression, as well as poorly controlled diabetes.  Patient states she has felt depressed over the past several weeks.  She is not suicidal or homicidal.  She states there has not been any changes in her life to warrant this however she is \"just tired of dealing with things\".  Patient does have a therapist that she sees.  Patient is on antidepressants.  She has not attempted to hurt herself today.  She has not ever attempted suicide.  Patient has been sober for 8 months.    In addition patient states her blood sugars have been high as of late.  Patient is a type I diabetic.  She states she is noncompliant secondary to depression.  She has had episodes of DKA in the past.  She denies any recent nausea, vomiting, palpitations.    Review of Medical Records  I reviewed patient's last ER visit from 5/20/2019.  Patient seen for hyperglycemia.    PAST MEDICAL HISTORY  Active Ambulatory Problems     Diagnosis Date Noted   • Hyperglycemia 06/02/2017   • Nausea and vomiting due to hyperglycemia 06/02/2017   • Generalized pain 06/02/2017   • Chest pain 06/02/2017   • Hyponatremia 06/02/2017   • Type 1 diabetes mellitus without complication (CMS/HCC) 06/02/2017   • hx of Graves disease, now hypothyroid 02/20/2018   • IV drug abuse (CMS/[facility])    • Multiple allergies 02/20/2018   • Postablative hypothyroidism 03/05/2018   • Microalbuminuria due to type 1 diabetes mellitus (CMS/[facility]) " 2018   • Elevated transaminase level 2019   • Anxiety and depression 2019     Resolved Ambulatory Problems     Diagnosis Date Noted   • DKA (diabetic ketoacidoses) (CMS/HCC) 2019   • Acute kidney injury (CMS/[facility]) 2019   • Thrombocytosis (CMS/[facility]) 2019     Past Medical History:   Diagnosis Date   • Anxiety    • Chronic low back pain    • Depression    • Diabetes mellitus (CMS/[facility])    • Herniated lumbar intervertebral disc    • History of Papanicolaou smear of cervix ?         PAST SURGICAL HISTORY  Past Surgical History:   Procedure Laterality Date   •  SECTION       and    • ENDOMETRIAL ABLATION     • FOOT SURGERY      METAL DINA PLACED IN FOOT   • SINUS SURGERY      ,    • TONSILLECTOMY           FAMILY HISTORY  Family History   Problem Relation Age of Onset   • Heart attack Father 57   • Diabetes type I Father    • Macular degeneration Mother    • Fibromyalgia Mother    • Diabetes type II Brother    • Congenital heart disease Son         hypertrophic cardiomyopathy         SOCIAL HISTORY  Social History     Socioeconomic History   • Marital status: Single     Spouse name: Not on file   • Number of children: Not on file   • Years of education: Not on file   • Highest education level: Not on file   Tobacco Use   • Smoking status: Former Smoker   • Smokeless tobacco: Never Used   • Tobacco comment: QUIT 2017   Substance and Sexual Activity   • Alcohol use: No   • Drug use: No     Comment: In recovery. 90 days now. IV drug use and Marijuana   • Sexual activity: Defer     Comment:    Social History Narrative    Live with boyfriend and son          ALLERGIES  Sulfa antibiotics, Peanut-containing drug products, Shellfish-derived products, Dairy aid [lactase], Eggs or egg-derived products, Soybean-containing drug products, and Wheat extract        REVIEW OF SYSTEMS  All systems reviewed and negative except for those discussed in HPI.        PHYSICAL EXAM    I have reviewed the triage vital signs and nursing notes.    ED Triage Vitals   Temp Heart Rate Resp BP SpO2   01/10/21 1047 01/10/21 1047 01/10/21 1047 01/10/21 1109 01/10/21 1047   97.9 °F (36.6 °C) 118 18 122/89 98 %      Temp src Heart Rate Source Patient Position BP Location FiO2 (%)   01/10/21 1047 01/10/21 1047 -- -- --   Tympanic Monitor          Physical Exam  GENERAL: Alert, oriented, tearful, not distressed  HENT: head atraumatic, no nuchal rigidity  EYES: no scleral icterus, EOMI  CV: regular rhythm, regular rate, no murmur  RESPIRATORY: normal effort, CTA  ABDOMEN: soft, nontender  MUSCULOSKELETAL: no deformity, FROM, no calf swelling or tenderness  NEURO: alert, moves all extremities, follows commands  PSYCH: Flat affect, depressed  SKIN: warm, dry        LAB RESULTS  Recent Results (from the past 24 hour(s))   POC Glucose Once    Collection Time: 01/10/21 10:45 AM    Specimen: Blood   Result Value Ref Range    Glucose 297 (H) 70 - 130 mg/dL   Comprehensive Metabolic Panel    Collection Time: 01/10/21 11:04 AM    Specimen: Blood   Result Value Ref Range    Glucose 298 (H) 65 - 99 mg/dL    BUN 14 6 - 20 mg/dL    Creatinine 0.76 0.57 - 1.00 mg/dL    Sodium 132 (L) 136 - 145 mmol/L    Potassium 4.2 3.5 - 5.2 mmol/L    Chloride 98 98 - 107 mmol/L    CO2 25.9 22.0 - 29.0 mmol/L    Calcium 8.6 8.6 - 10.5 mg/dL    Total Protein 6.5 6.0 - 8.5 g/dL    Albumin 3.90 3.50 - 5.20 g/dL    ALT (SGPT) 14 1 - 33 U/L    AST (SGOT) 21 1 - 32 U/L    Alkaline Phosphatase 79 39 - 117 U/L    Total Bilirubin 0.5 0.0 - 1.2 mg/dL    eGFR Non African Amer 86 >60 mL/min/1.73    Globulin 2.6 gm/dL    A/G Ratio 1.5 g/dL    BUN/Creatinine Ratio 18.4 7.0 - 25.0    Anion Gap 8.1 5.0 - 15.0 mmol/L   hCG, Serum, Qualitative    Collection Time: 01/10/21 11:04 AM    Specimen: Blood   Result Value Ref Range    HCG Qualitative Negative Negative   Ethanol    Collection Time: 01/10/21 11:04 AM    Specimen: Blood    Result Value Ref Range    Ethanol <10 0 - 10 mg/dL    Ethanol % <0.010 %   CBC Auto Differential    Collection Time: 01/10/21 11:04 AM    Specimen: Blood   Result Value Ref Range    WBC 7.56 3.40 - 10.80 10*3/mm3    RBC 4.74 3.77 - 5.28 10*6/mm3    Hemoglobin 14.8 12.0 - 15.9 g/dL    Hematocrit 44.8 34.0 - 46.6 %    MCV 94.5 79.0 - 97.0 fL    MCH 31.2 26.6 - 33.0 pg    MCHC 33.0 31.5 - 35.7 g/dL    RDW 12.5 12.3 - 15.4 %    RDW-SD 43.0 37.0 - 54.0 fl    MPV 10.0 6.0 - 12.0 fL    Platelets 337 140 - 450 10*3/mm3    Neutrophil % 74.3 42.7 - 76.0 %    Lymphocyte % 18.8 (L) 19.6 - 45.3 %    Monocyte % 5.3 5.0 - 12.0 %    Eosinophil % 0.8 0.3 - 6.2 %    Basophil % 0.5 0.0 - 1.5 %    Immature Grans % 0.3 0.0 - 0.5 %    Neutrophils, Absolute 5.62 1.70 - 7.00 10*3/mm3    Lymphocytes, Absolute 1.42 0.70 - 3.10 10*3/mm3    Monocytes, Absolute 0.40 0.10 - 0.90 10*3/mm3    Eosinophils, Absolute 0.06 0.00 - 0.40 10*3/mm3    Basophils, Absolute 0.04 0.00 - 0.20 10*3/mm3    Immature Grans, Absolute 0.02 0.00 - 0.05 10*3/mm3    nRBC 0.0 0.0 - 0.2 /100 WBC   Light Blue Top    Collection Time: 01/10/21 11:04 AM   Result Value Ref Range    Extra Tube hold for add-on    Green Top (Gel)    Collection Time: 01/10/21 11:04 AM   Result Value Ref Range    Extra Tube Hold for add-ons.    Lavender Top    Collection Time: 01/10/21 11:04 AM   Result Value Ref Range    Extra Tube hold for add-on    Urinalysis With Microscopic If Indicated (No Culture) - Urine, Clean Catch    Collection Time: 01/10/21 12:01 PM    Specimen: Urine, Clean Catch   Result Value Ref Range    Color, UA Yellow Yellow, Straw    Appearance, UA Clear Clear    pH, UA 7.0 5.0 - 8.0    Specific Gravity, UA >=1.030 1.005 - 1.030    Glucose, UA >=1000 mg/dL (3+) (A) Negative    Ketones, UA 15 mg/dL (1+) (A) Negative    Bilirubin, UA Negative Negative    Blood, UA Trace (A) Negative    Protein,  mg/dL (2+) (A) Negative    Leuk Esterase, UA Negative Negative    Nitrite, UA  Negative Negative    Urobilinogen, UA 1.0 E.U./dL 0.2 - 1.0 E.U./dL   Urine Drug Screen - Urine, Clean Catch    Collection Time: 01/10/21 12:01 PM    Specimen: Urine, Clean Catch   Result Value Ref Range    Amphet/Methamphet, Screen Negative Negative    Barbiturates Screen, Urine Negative Negative    Benzodiazepine Screen, Urine Negative Negative    Cocaine Screen, Urine Negative Negative    Opiate Screen Negative Negative    THC, Screen, Urine Negative Negative    Methadone Screen, Urine Negative Negative    Oxycodone Screen, Urine Negative Negative   Urinalysis, Microscopic Only - Urine, Clean Catch    Collection Time: 01/10/21 12:01 PM    Specimen: Urine, Clean Catch   Result Value Ref Range    RBC, UA 13-20 (A) None Seen, 0-2 /HPF    WBC, UA 6-12 (A) None Seen, 0-2 /HPF    Bacteria, UA None Seen None Seen /HPF    Squamous Epithelial Cells, UA 7-12 (A) None Seen, 0-2 /HPF    Hyaline Casts, UA 3-6 None Seen /LPF    Methodology Automated Microscopy        Ordered the above labs and independently reviewed the results.      MEDICATIONS GIVEN IN ER    Medications   sodium chloride 0.9 % bolus 1,000 mL (0 mL Intravenous Stopped 1/10/21 1348)   ondansetron (ZOFRAN) injection 4 mg (4 mg Intravenous Given 1/10/21 1206)         PROGRESS, DATA ANALYSIS, CONSULTS, AND MEDICAL DECISION MAKING    All labs have been independently reviewed by me.  All radiology studies have been reviewed by me and discussed with radiologist dictating the report.   EKG's independently viewed and interpreted by me.  Discussion below represents my analysis of pertinent findings related to patient's condition, differential diagnosis, treatment plan and final disposition.    I have discussed case with Dr. Ho, emergency room physician.  She has performed her own bedside examination and agrees with treatment plan.    ED Course as of Kevin 10 2021   Sun Kevin 10, 2021   1115 Patient presents with depression without suicidal ideation, as well as poorly  controlled diabetes secondary to noncompliance.  Plan to check basic labs and ensure no DKA as well as have access evaluate patient.    [EE]   1140 WBC: 7.56 [EE]   1140 Hemoglobin: 14.8 [EE]   1140 Ethanol %: <0.010 [EE]   1214 Anion Gap: 8.1 [EE]   1214 CO2: 25.9 [EE]   1215 No evidence of DKA.    [EE]   1342 Discussed with [facility] access staff, reports she is staffed the patient history and interview/exam with a psychiatrist, Dr. Benjy Allen, and the patient does not meet criteria for admission, close outpatient follow-up is recommended at this time.    [TO]      ED Course User Index  [EE] Reginald Saldivar PA  [TO] Katie Ho MD       AS OF 20:21 EST VITALS:    BP - 123/79  HR - 73  TEMP - 97.9 °F (36.6 °C) (Tympanic)  O2 SATS - 99%        DIAGNOSIS  Final diagnoses:   Hyperglycemia   Depression, unspecified depression type         DISPOSITION  Discharged           Reginald Saldivar PA  01/10/21 2022

## 2021-01-10 NOTE — ED NOTES
Pt reports FSBG has been elevated for the past couple weeks- 200's-300's. Hx of diabetes. Takes insulin. +lack of appetite and generalized body aches. +vomiting x1. Denies abd pain or diarrhea. Also would like to be seen for depression that has been going on for a couple weeks. Denies SI/HI. Takes lexapro and buspar without missing doses. Seens therapist regularly.      Anca Jaramillo, RN  01/10/21 0718

## 2021-01-10 NOTE — ED NOTES
"Pt to ED from sober house for high BS x 1 week,  has not been compliant with taking her DM meds d/t occasional suicidal ideation stating she has thought about cutting herself and \"just doesn;t care about anything anymore because of her depression.\" + n/v in last few days. Pt given box lunch and water per Reginald KOTHARI     Pt denies suicidal actions in the past or actual SI at this time during initial eval. Then states \"I don't know\" pt appears to be low risk,  has been sober from iv drug use x 8 months.     Patient was placed in face mask in first look. Patient was wearing facemask when I entered the room and throughout our encounter. I wore full protective equipment throughout this patient encounter including a face mask, eye shield, and gloves. Hand hygiene was performed before donning protective equipment and after removal when leaving the room.       Sparkle Rivas RN  01/10/21 1113       Sparkle Rivas RN  01/10/21 1144    "

## 2021-01-10 NOTE — ED PROVIDER NOTES
The CARMITA and I have discussed this patient's history, physical exam, and treatment plan. I have reviewed the documentation and personally had a face to face interaction with the patient  I affirm the documentation and agree with the treatment and plan.  The following describes my personal findings.    The patient presents complaining of feeling more depressed than usual for her, and elevated blood sugars.  Patient reports she has been taking her medications as directed, seeing her therapist, Dr. Cronin with [facility].  patient reports her depression has been worse for the past several weeks, with thoughts of wanting to cut herself, but denies wish to kill herself or anyone else.  Patient reports she has cut herself in the past but denies true suicide attempt.  Patient reports she has been sleeping approximately 5 hours daily over the past month.    Limited physical exam:  Patient is nontoxic appearing awake, alert, oriented  Lungs/cardiovascular: No tachypnea, breath sounds normal, nontachycardic, regular rate without obvious murmur  Abdomen: Soft, nontender  Back/extremities: Posterior tibial pulses intact bilateral lower extremities without edema    1312  Discussed with ian Mckeon, who will evaluate the patient in the emergency department today    LAB RESULTS  Recent Results (from the past 24 hour(s))   POC Glucose Once    Collection Time: 01/10/21 10:45 AM    Specimen: Blood   Result Value Ref Range    Glucose 297 (H) 70 - 130 mg/dL   Comprehensive Metabolic Panel    Collection Time: 01/10/21 11:04 AM    Specimen: Blood   Result Value Ref Range    Glucose 298 (H) 65 - 99 mg/dL    BUN 14 6 - 20 mg/dL    Creatinine 0.76 0.57 - 1.00 mg/dL    Sodium 132 (L) 136 - 145 mmol/L    Potassium 4.2 3.5 - 5.2 mmol/L    Chloride 98 98 - 107 mmol/L    CO2 25.9 22.0 - 29.0 mmol/L    Calcium 8.6 8.6 - 10.5 mg/dL    Total Protein 6.5 6.0 - 8.5 g/dL    Albumin 3.90 3.50 - 5.20 g/dL    ALT (SGPT) 14 1 - 33  U/L    AST (SGOT) 21 1 - 32 U/L    Alkaline Phosphatase 79 39 - 117 U/L    Total Bilirubin 0.5 0.0 - 1.2 mg/dL    eGFR Non African Amer 86 >60 mL/min/1.73    Globulin 2.6 gm/dL    A/G Ratio 1.5 g/dL    BUN/Creatinine Ratio 18.4 7.0 - 25.0    Anion Gap 8.1 5.0 - 15.0 mmol/L   hCG, Serum, Qualitative    Collection Time: 01/10/21 11:04 AM    Specimen: Blood   Result Value Ref Range    HCG Qualitative Negative Negative   Ethanol    Collection Time: 01/10/21 11:04 AM    Specimen: Blood   Result Value Ref Range    Ethanol <10 0 - 10 mg/dL    Ethanol % <0.010 %   CBC Auto Differential    Collection Time: 01/10/21 11:04 AM    Specimen: Blood   Result Value Ref Range    WBC 7.56 3.40 - 10.80 10*3/mm3    RBC 4.74 3.77 - 5.28 10*6/mm3    Hemoglobin 14.8 12.0 - 15.9 g/dL    Hematocrit 44.8 34.0 - 46.6 %    MCV 94.5 79.0 - 97.0 fL    MCH 31.2 26.6 - 33.0 pg    MCHC 33.0 31.5 - 35.7 g/dL    RDW 12.5 12.3 - 15.4 %    RDW-SD 43.0 37.0 - 54.0 fl    MPV 10.0 6.0 - 12.0 fL    Platelets 337 140 - 450 10*3/mm3    Neutrophil % 74.3 42.7 - 76.0 %    Lymphocyte % 18.8 (L) 19.6 - 45.3 %    Monocyte % 5.3 5.0 - 12.0 %    Eosinophil % 0.8 0.3 - 6.2 %    Basophil % 0.5 0.0 - 1.5 %    Immature Grans % 0.3 0.0 - 0.5 %    Neutrophils, Absolute 5.62 1.70 - 7.00 10*3/mm3    Lymphocytes, Absolute 1.42 0.70 - 3.10 10*3/mm3    Monocytes, Absolute 0.40 0.10 - 0.90 10*3/mm3    Eosinophils, Absolute 0.06 0.00 - 0.40 10*3/mm3    Basophils, Absolute 0.04 0.00 - 0.20 10*3/mm3    Immature Grans, Absolute 0.02 0.00 - 0.05 10*3/mm3    nRBC 0.0 0.0 - 0.2 /100 WBC   Light Blue Top    Collection Time: 01/10/21 11:04 AM   Result Value Ref Range    Extra Tube hold for add-on    Green Top (Gel)    Collection Time: 01/10/21 11:04 AM   Result Value Ref Range    Extra Tube Hold for add-ons.    Lavender Top    Collection Time: 01/10/21 11:04 AM   Result Value Ref Range    Extra Tube hold for add-on    Urinalysis With Microscopic If Indicated (No Culture) - Urine, Clean  Catch    Collection Time: 01/10/21 12:01 PM    Specimen: Urine, Clean Catch   Result Value Ref Range    Color, UA Yellow Yellow, Straw    Appearance, UA Clear Clear    pH, UA 7.0 5.0 - 8.0    Specific Gravity, UA >=1.030 1.005 - 1.030    Glucose, UA >=1000 mg/dL (3+) (A) Negative    Ketones, UA 15 mg/dL (1+) (A) Negative    Bilirubin, UA Negative Negative    Blood, UA Trace (A) Negative    Protein,  mg/dL (2+) (A) Negative    Leuk Esterase, UA Negative Negative    Nitrite, UA Negative Negative    Urobilinogen, UA 1.0 E.U./dL 0.2 - 1.0 E.U./dL   Urine Drug Screen - Urine, Clean Catch    Collection Time: 01/10/21 12:01 PM    Specimen: Urine, Clean Catch   Result Value Ref Range    Amphet/Methamphet, Screen Negative Negative    Barbiturates Screen, Urine Negative Negative    Benzodiazepine Screen, Urine Negative Negative    Cocaine Screen, Urine Negative Negative    Opiate Screen Negative Negative    THC, Screen, Urine Negative Negative    Methadone Screen, Urine Negative Negative    Oxycodone Screen, Urine Negative Negative   Urinalysis, Microscopic Only - Urine, Clean Catch    Collection Time: 01/10/21 12:01 PM    Specimen: Urine, Clean Catch   Result Value Ref Range    RBC, UA 13-20 (A) None Seen, 0-2 /HPF    WBC, UA 6-12 (A) None Seen, 0-2 /HPF    Bacteria, UA None Seen None Seen /HPF    Squamous Epithelial Cells, UA 7-12 (A) None Seen, 0-2 /HPF    Hyaline Casts, UA 3-6 None Seen /LPF    Methodology Automated Microscopy        I ordered the above labs and reviewed the results.    RADIOLOGY  No Radiology Exams Resulted Within Past 24 Hours    I ordered the above noted radiological studies. I reviewed the images and results. I agree with the radiologist interpretation.    PROCEDURES  Procedures      PROGRESS, DATA ANALYSIS, CONSULTS, AND MEDICAL DECISION MAKING  A complete history and physical exam have been performed.  All available laboratory and imaging results have been reviewed by myself prior to  disposition.        [facility]    ED Course as of Kevin 10 2126   Sun Kevin 10, 2021   1115 Patient presents with depression without suicidal ideation, as well as poorly controlled diabetes secondary to noncompliance.  Plan to check basic labs and ensure no DKA as well as have access evaluate patient.    [EE]   1140 WBC: 7.56 [EE]   1140 Hemoglobin: 14.8 [EE]   1140 Ethanol %: <0.010 [EE]   1214 Anion Gap: 8.1 [EE]   1214 CO2: 25.9 [EE]   1215 No evidence of DKA.    [EE]   1342 Discussed with [facility] access staff, reports she is staffed the patient history and interview/exam with a psychiatrist, Dr. Benjy Allen, and the patient does not meet criteria for admission, close outpatient follow-up is recommended at this time.    [TO]      ED Course User Index  [EE] Reginald Saldivar PA  [TO] Katie Ho MD       AS OF 21:26 EST VITALS:    BP - 123/79  HR - 73  TEMP - 97.9 °F (36.6 °C) (Tympanic)  O2 SATS - 99%    DIAGNOSIS  Final diagnoses:   Hyperglycemia   Depression, unspecified depression type         DISPOSITION  DISCHARGE    Patient discharged in stable condition.    Reviewed implications of results, diagnosis, meds, responsibility to follow up, warning signs and symptoms of possible worsening, potential complications and reasons to return to ER, including suicidal/homicidal thoughts, hallucinations, vomiting, worsening chest/abdominal pain, shortness of air or other concerns.    Patient/Family voiced understanding of above instructions.    Discussed plan for discharge, as there is no emergent indication for admission. Patient referred to primary care provider for BP management due to today's BP. Pt/family is agreeable and understands need for follow up and repeat testing.  Pt is aware that discharge does not mean that nothing is wrong but it indicates no emergency is present that requires admission and they must continue care with follow-up as given below or physician of their choice.     FOLLOW-UP  Mindy Andrews,  APRN  [address]    [facility] [address]  [phone]    Schedule an appointment as soon as possible for a visit in 3 days  EVEN IF WELL    Intensive outpatient therapy at [facility]    Schedule an appointment as soon as possible for a visit in 2 days  EVEN IF WELL    your therapist Dr Cronin at [facility]    Schedule an appointment as soon as possible for a visit in 3 days  EVEN IF WELL, and increase frequency of visits to weekly as needed         Medication List      Stop    buPROPion  MG 12 hr tablet  Commonly known as: WELLBUTRIN SR     cetirizine 10 MG tablet  Commonly known as: zyrTEC     insulin lispro 100 UNIT/ML injection  Commonly known as: HumaLOG     promethazine 25 MG suppository  Commonly known as: PHENERGAN     promethazine 25 MG tablet  Commonly known as: PHENERGAN                Face mask, protective goggles and gloves were worn throughout the patient encounter, unless additional PPE was worn as indicated. Hand hygiene was performed before entering and after leaving the patient room.      Patient was wearing facemask when I entered the room and throughout our encounter. Full protective equipment was worn throughout this patient encounter including a face mask, eye protection and gloves. Hand hygiene was performed before donning protective equipment and after removal when leaving the room.           Katie Ho MD  01/10/21 2512

## 2021-01-10 NOTE — ED NOTES
Pt states can take zofran but had given her a HA in the past, unsure if it was oral or iv version. Agrees to try it d/t provider does not want pt to have anything potentially sedating prior to potential access eval. Pt informed and aware.      Sparkle Rivas, RN  01/10/21 1072

## 2021-01-10 NOTE — DISCHARGE INSTRUCTIONS
You are advised to follow closely with Mindy Andrews and your therapist, Dr. Cronin, or intensive outpatient therapy at the [facility] in 2-3 days as discussed with access staff in the emergency department today for recheck, blood sugar recheck, final results of lab work and imaging testing, and further testing/treatment as needed.    Drink at least 8 to 10 glasses of fluids daily without caffeine.  No caffeine after noon.  Regular sleep patterns with bedtime at the same time every evening.  No electronic devices for at least 1 hour prior to bedtime.  Consider taking melatonin 1 hour prior to bedtime.  Low concentrated sweet, high-protein diet as directed.  Medications as previously directed on a daily basis.        Please return to the emergency department immediately with chest pain different than usual for you, shortness of air, abdominal pain, persistent vomiting/fever, blood in emesis or stool, lightheadedness/fainting, problems with speech, one sided weakness/numbness, new incontinence, problems with vision, altered mental status, suicidal/homicidal thoughts, lightheadedness, vomiting or for worsening of symptoms or other concerns.

## 2021-04-23 ENCOUNTER — LAB (OUTPATIENT)
Dept: LAB | Facility: HOSPITAL | Age: 36
End: 2021-04-23

## 2021-04-23 ENCOUNTER — HOSPITAL ENCOUNTER (OUTPATIENT)
Dept: GENERAL RADIOLOGY | Facility: HOSPITAL | Age: 36
Discharge: HOME | End: 2021-04-23
Admitting: NURSE PRACTITIONER

## 2021-04-23 ENCOUNTER — OFFICE VISIT (OUTPATIENT)
Dept: INTERNAL MEDICINE | Facility: CLINIC | Age: 36
End: 2021-04-23

## 2021-04-23 VITALS
HEART RATE: 77 BPM | TEMPERATURE: 97.1 F | BODY MASS INDEX: 25.74 KG/M2 | SYSTOLIC BLOOD PRESSURE: 104 MMHG | OXYGEN SATURATION: 99 % | HEIGHT: 67 IN | WEIGHT: 164 LBS | RESPIRATION RATE: 16 BRPM | DIASTOLIC BLOOD PRESSURE: 60 MMHG

## 2021-04-23 DIAGNOSIS — N89.8 VAGINAL ITCHING: ICD-10-CM

## 2021-04-23 DIAGNOSIS — M25.512 BILATERAL SHOULDER PAIN, UNSPECIFIED CHRONICITY: ICD-10-CM

## 2021-04-23 DIAGNOSIS — M25.619 DECREASED RANGE OF MOTION OF SHOULDER, UNSPECIFIED LATERALITY: ICD-10-CM

## 2021-04-23 DIAGNOSIS — E10.9 TYPE 1 DIABETES MELLITUS WITHOUT COMPLICATION (HCC): Primary | ICD-10-CM

## 2021-04-23 DIAGNOSIS — M25.511 BILATERAL SHOULDER PAIN, UNSPECIFIED CHRONICITY: ICD-10-CM

## 2021-04-23 DIAGNOSIS — R35.0 URINARY FREQUENCY: ICD-10-CM

## 2021-04-23 LAB
BILIRUB BLD-MCNC: NEGATIVE MG/DL
CLARITY, POC: CLEAR
COLOR UR: ABNORMAL
GLUCOSE UR STRIP-MCNC: ABNORMAL MG/DL
HBA1C MFR BLD: 9.2 %
KETONES UR QL: NEGATIVE
LEUKOCYTE EST, POC: NEGATIVE
NITRITE UR-MCNC: NEGATIVE MG/ML
PH UR: 6 [PH] (ref 5–8)
PROT UR STRIP-MCNC: ABNORMAL MG/DL
RBC # UR STRIP: NEGATIVE /UL
SP GR UR: 1.02 (ref 1–1.03)
UROBILINOGEN UR QL: NORMAL

## 2021-04-23 PROCEDURE — 73030 X-RAY EXAM OF SHOULDER: CPT

## 2021-04-23 PROCEDURE — 83036 HEMOGLOBIN GLYCOSYLATED A1C: CPT | Performed by: NURSE PRACTITIONER

## 2021-04-23 PROCEDURE — 87798 DETECT AGENT NOS DNA AMP: CPT | Performed by: NURSE PRACTITIONER

## 2021-04-23 PROCEDURE — 87086 URINE CULTURE/COLONY COUNT: CPT | Performed by: NURSE PRACTITIONER

## 2021-04-23 PROCEDURE — 87591 N.GONORRHOEAE DNA AMP PROB: CPT | Performed by: NURSE PRACTITIONER

## 2021-04-23 PROCEDURE — 99214 OFFICE O/P EST MOD 30 MIN: CPT | Performed by: NURSE PRACTITIONER

## 2021-04-23 PROCEDURE — 87661 TRICHOMONAS VAGINALIS AMPLIF: CPT | Performed by: NURSE PRACTITIONER

## 2021-04-23 PROCEDURE — 87491 CHLMYD TRACH DNA AMP PROBE: CPT | Performed by: NURSE PRACTITIONER

## 2021-04-23 PROCEDURE — 87801 DETECT AGNT MULT DNA AMPLI: CPT | Performed by: NURSE PRACTITIONER

## 2021-04-23 RX ORDER — TRAZODONE HYDROCHLORIDE 100 MG/1
400 TABLET ORAL NIGHTLY
COMMUNITY
End: 2021-06-17

## 2021-04-23 RX ORDER — FLUCONAZOLE 150 MG/1
150 TABLET ORAL ONCE
Qty: 1 TABLET | Refills: 0 | Status: SHIPPED | OUTPATIENT
Start: 2021-04-23 | End: 2021-04-23

## 2021-04-23 NOTE — PROGRESS NOTES
Subjective   Marisol Cuellar is a 35 y.o. female    Chief Complaint   Patient presents with   • Establish Care   • Urinary Tract Infection     burning with urination, frequency, x1 week no OTC medications, has increased water intake   • Shoulder Pain     bilateral shoulder pain, left worse than right, hx of frozen shouler     History of Present Illness     New pt here to establish care.      DM II - dx'd at age 10.  Current regimen is Tresiba 25 mg daily along with Admelog sliding scale insulin.  She is currently doing 1 unit per 10 carbs 3 times a day with meals.  She has previously been living in a sober living facility in [place].  She was established with endocrinology there.  States that her last A1c a few months ago was 9.9%.  A1c today is 9.2%.      Pt c/o burning with urination and vaginal itching/burning x1 week.  Positive urinary frequency.  No low back pain or abdominal pain.  No fever chills.  She states that she is not sexually active.  She denies any vaginal discharge.    Shoulder pain - c/o bilateral shoulder pain x 4 months.  She has a h/o frozen shoulder.  L > R.  States that pain worsened after she fell on the ice in February.  States that range of motion is very limited especially on the left.  No OTC treatment.  No physical therapy.  No recent x-rays.    Patient with history of substance abuse.  She has been sober now for 11 months.  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  After her lumbar laminectomy, she was transferred to a facility in [place] for detox and treatment of the endocarditis.  States that since then she was transitioned to a sober living facility in which she has been until approximately 1 week ago.  States that she is doing well.  She is working an [email or web address] program here in [place].    Past Medical History:   Diagnosis Date   • Anxiety    • Chronic low back pain    • Depression    • Diabetes  "mellitus (CMS/HCC)     TYPE 1   • Graves disease    • Hep C     was treated    • Herniated lumbar intervertebral disc    • History of Papanicolaou smear of cervix ?    old PCP. Dr. Lee   • IV drug abuse (CMS/HCC)      Past Surgical History:   Procedure Laterality Date   •  SECTION       and    • ENDOMETRIAL ABLATION  2006   • FOOT SURGERY Right 2014    METAL DINA PLACED IN FOOT   • LAMINECTOMY  2020    lower lubmar laminectomy   • SINUS SURGERY      ,    • TONSILLECTOMY  2006     Family History   Problem Relation Age of Onset   • Heart attack Father 57   • Diabetes type I Father    • Macular degeneration Mother    • Fibromyalgia Mother    • Arthritis Mother    • Diabetes type II Brother    • Diabetes type I Maternal Grandmother    • Cancer Maternal Grandfather         bladder   • No Known Problems Paternal Grandmother    • No Known Problems Paternal Grandfather    • No Known Problems Son    • Congenital heart disease Son         hypertrophic cardiomyopathy   • Other Son         \"nunan's\"     Social History     Socioeconomic History   • Marital status: Single     Spouse name: Not on file   • Number of children: Not on file   • Years of education: Not on file   • Highest education level: Not on file   Tobacco Use   • Smoking status: Former Smoker     Packs/day: 1.00     Years: 5.00     Pack years: 5.00     Types: Cigarettes     Start date:      Quit date: 2021     Years since quittin.0   • Smokeless tobacco: Never Used   Vaping Use   • Vaping Use: Every day   • Start date: 2021   • Substances: Nicotine, Flavoring   • Devices: Pre-filled or refillable cartridge   Substance and Sexual Activity   • Alcohol use: No   • Drug use: Yes     Types: IV     Comment: history of IV drug abuse (heroine, meth) and smoked crack; sober since 5/15/2020   • Sexual activity: Defer     Comment: single     Allergies   Allergen Reactions   • Peanut-Containing Drug Products Other (See Comments) "     Coughing and tightness in chest   • Shellfish-Derived Products Other (See Comments)     Itchy throat and tightness   • Sulfa Antibiotics Anaphylaxis   • Dairy Aid [Lactase] Rash     migraines   • Eggs Or Egg-Derived Products Rash     migraines   • Soybean-Containing Drug Products Rash     migraines   • Wheat Extract Rash     migraines         The following portions of the patient's history were reviewed and updated as appropriate: allergies, current medications, past family history, past medical history, past social history, past surgical history and problem list.    Current Outpatient Medications:   •  B-D ULTRAFINE III SHORT PEN 31G X 8 MM misc, USE UTD WITH TRESIBA, Disp: , Rfl: 3  •  busPIRone (BUSPAR) 15 MG tablet, Take 15 mg by mouth 2 (two) times a day., Disp: , Rfl:   •  escitalopram (LEXAPRO) 20 MG tablet, Take 20 mg by mouth Daily., Disp: , Rfl:   •  glucose blood (Kavitha Contour Test) test strip, Check your blood sugars 4 (four) times daily., Disp: 200 each, Rfl: 11  •  Insulin Degludec (TRESIBA SC), Inject 25 Units under the skin into the appropriate area as directed., Disp: , Rfl:   •  insulin lispro (Admelog) 100 UNIT/ML injection, Inject  under the skin into the appropriate area as directed 3 (Three) Times a Day Before Meals. Sliding scale, Disp: , Rfl:   •  levothyroxine (SYNTHROID, LEVOTHROID) 300 MCG tablet, Take 1 tablet by mouth Daily., Disp: 30 tablet, Rfl: 6  •  traZODone (DESYREL) 100 MG tablet, Take 400 mg by mouth Every Night., Disp: , Rfl:   •  fluconazole (Diflucan) 150 MG tablet, Take 1 tablet by mouth 1 (One) Time for 1 dose., Disp: 1 tablet, Rfl: 0     Review of Systems   Constitutional: Negative for chills, fatigue and fever.   Respiratory: Negative for cough, chest tightness and shortness of breath.    Cardiovascular: Negative for chest pain.   Gastrointestinal: Negative for abdominal distention, abdominal pain, anal bleeding, blood in stool, constipation, diarrhea, nausea, rectal  "pain and vomiting.   Endocrine: Negative for cold intolerance and heat intolerance.   Genitourinary: Positive for dysuria, frequency and urgency. Negative for decreased urine volume, difficulty urinating, dyspareunia, enuresis, flank pain, genital sores, hematuria, menstrual problem, pelvic pain, vaginal bleeding, vaginal discharge and vaginal pain.        Vaginal itching   Musculoskeletal: Positive for arthralgias (bilateral shoulder pain).   Neurological: Negative for dizziness.       Objective   Physical Exam  Constitutional:       Appearance: She is well-developed.   HENT:      Head: Normocephalic and atraumatic.   Eyes:      Conjunctiva/sclera: Conjunctivae normal.      Pupils: Pupils are equal, round, and reactive to light.   Cardiovascular:      Rate and Rhythm: Normal rate and regular rhythm.      Heart sounds: Normal heart sounds.   Pulmonary:      Effort: Pulmonary effort is normal.      Breath sounds: Normal breath sounds.   Abdominal:      General: Bowel sounds are normal.      Palpations: Abdomen is soft.   Musculoskeletal:         General: Normal range of motion.      Cervical back: Normal range of motion.   Skin:     General: Skin is warm and dry.   Neurological:      Mental Status: She is alert and oriented to person, place, and time.      Deep Tendon Reflexes: Reflexes are normal and symmetric.   Psychiatric:         Behavior: Behavior normal.         Thought Content: Thought content normal.         Judgment: Judgment normal.       Vitals:    04/23/21 0854   BP: 104/60   Pulse: 77   Resp: 16   Temp: 97.1 °F (36.2 °C)   TempSrc: Infrared   SpO2: 99%   Weight: 74.4 kg (164 lb)   Height: 170.2 cm (67\")         Assessment/Plan   Diagnoses and all orders for this visit:    1. Type 1 diabetes mellitus without complication (CMS/[facility]) (Primary)  -     POC Glycosylated Hemoglobin (Hb A1C)  -     Ambulatory Referral to Endocrinology    2. Urinary frequency  -     POCT urinalysis dipstick, automated  -     " Urine Culture - , Urine, Clean Catch; Future    3. Vaginal itching  -     NuSwab VG+ - , Vagina; Future  -     fluconazole (Diflucan) 150 MG tablet; Take 1 tablet by mouth 1 (One) Time for 1 dose.  Dispense: 1 tablet; Refill: 0    4. Bilateral shoulder pain, unspecified chronicity  -     XR shoulder 2+ vw bilateral; Future    5. Decreased range of motion of shoulder, unspecified laterality  -     XR shoulder 2+ vw bilateral; Future      Referred to endocrinology  Increase Tresiba to 30 units daily  UA negative for UTI  Will send for culture for safe measure  New swab sent  Covered with Diflucan  Sent for x-ray of the bilateral shoulders, may need PT versus Ortho  Return in about 6 weeks (around 6/4/2021) for Annual.

## 2021-04-24 LAB — BACTERIA SPEC AEROBE CULT: NO GROWTH

## 2021-04-26 ENCOUNTER — TELEPHONE (OUTPATIENT)
Dept: INTERNAL MEDICINE | Facility: CLINIC | Age: 36
End: 2021-04-26

## 2021-04-26 LAB
A VAGINAE DNA VAG QL NAA+PROBE: ABNORMAL SCORE
BVAB2 DNA VAG QL NAA+PROBE: ABNORMAL SCORE
C ALBICANS DNA VAG QL NAA+PROBE: POSITIVE
C GLABRATA DNA VAG QL NAA+PROBE: NEGATIVE
C TRACH DNA VAG QL NAA+PROBE: NEGATIVE
MEGA1 DNA VAG QL NAA+PROBE: ABNORMAL SCORE
N GONORRHOEA DNA VAG QL NAA+PROBE: NEGATIVE
T VAGINALIS DNA VAG QL NAA+PROBE: NEGATIVE

## 2021-04-29 ENCOUNTER — TELEPHONE (OUTPATIENT)
Dept: INTERNAL MEDICINE | Facility: CLINIC | Age: 36
End: 2021-04-29

## 2021-04-29 RX ORDER — FLUCONAZOLE 150 MG/1
150 TABLET ORAL ONCE
Qty: 1 TABLET | Refills: 0 | Status: SHIPPED | OUTPATIENT
Start: 2021-04-29 | End: 2021-04-29

## 2021-05-03 ENCOUNTER — TELEPHONE (OUTPATIENT)
Dept: INTERNAL MEDICINE | Facility: CLINIC | Age: 36
End: 2021-05-03

## 2021-05-03 DIAGNOSIS — M25.519 SHOULDER PAIN, UNSPECIFIED CHRONICITY, UNSPECIFIED LATERALITY: Primary | ICD-10-CM

## 2021-05-03 NOTE — TELEPHONE ENCOUNTER
Patient is calling to check to see what the status of a referral is, if she is to be sent to ortho or PT etc.

## 2021-05-04 NOTE — TELEPHONE ENCOUNTER
Xray was normal.  It looks like pt was telephone and a VM was left.  I have referred to PT since the imaging was normal.

## 2021-05-04 NOTE — TELEPHONE ENCOUNTER
Called and informed patient that x-ray was normal and PT referral was in process. She verbalized understanding and had no further questions.

## 2021-05-12 ENCOUNTER — TELEPHONE (OUTPATIENT)
Dept: INTERNAL MEDICINE | Facility: CLINIC | Age: 36
End: 2021-05-12

## 2021-05-12 NOTE — TELEPHONE ENCOUNTER
Caller: Marisol Cuellar    Relationship: Self    Best call back number: [phone]    What is the medical concern/diagnosis: ENDO    What specialty or service is being requested: ENDO    What is the provider, practice or medical service name: N/A    What is the office location: N/A    What is the office phone number: N/A    Any additional details: PATIENT STATED THAT SHE WAS CALLING TO CHECK ON REFERRAL FOR ENDOCRINOLOGY    PLEASE ADVISE

## 2021-05-20 ENCOUNTER — HOSPITAL ENCOUNTER (OUTPATIENT)
Dept: PHYSICAL THERAPY | Facility: HOSPITAL | Age: 36
Setting detail: THERAPIES SERIES
Discharge: HOME | End: 2021-05-20

## 2021-05-20 DIAGNOSIS — M25.511 CHRONIC PAIN OF BOTH SHOULDERS: Primary | ICD-10-CM

## 2021-05-20 DIAGNOSIS — G89.29 CHRONIC PAIN OF BOTH SHOULDERS: Primary | ICD-10-CM

## 2021-05-20 DIAGNOSIS — M25.512 CHRONIC PAIN OF BOTH SHOULDERS: Primary | ICD-10-CM

## 2021-05-20 PROCEDURE — 97161 PT EVAL LOW COMPLEX 20 MIN: CPT

## 2021-05-21 NOTE — THERAPY EVALUATION
Outpatient Physical Therapy Ortho Initial Evaluation  [facility]     Patient Name: Marisol Cuellar  : 1985  MRN: [id number]  Today's Date: 2021      Visit Date: 2021    Patient Active Problem List   Diagnosis   • Hyperglycemia   • Nausea and vomiting due to hyperglycemia   • Generalized pain   • Chest pain   • Hyponatremia   • Type 1 diabetes mellitus without complication (CMS/HCC)   • hx of Graves disease, now hypothyroid   • IV drug abuse (CMS/HCC)   • Multiple allergies   • Postablative hypothyroidism   • Microalbuminuria due to type 1 diabetes mellitus (CMS/HCC)   • Elevated transaminase level   • Anxiety and depression        Past Medical History:   Diagnosis Date   • Anxiety    • Chronic low back pain    • Depression    • Diabetes mellitus (CMS/HCC)     TYPE 1   • Graves disease    • Hep C     was treated    • Herniated lumbar intervertebral disc    • History of Papanicolaou smear of cervix ?    old PCP. Dr. Lee   • IV drug abuse (CMS/HCC)         Past Surgical History:   Procedure Laterality Date   •  SECTION       and    • ENDOMETRIAL ABLATION     • FOOT SURGERY Right     METAL DINA PLACED IN FOOT   • LAMINECTOMY  2020    lower lubmar laminectomy   • SINUS SURGERY      ,    • TONSILLECTOMY  2006       Visit Dx:     ICD-10-CM ICD-9-CM   1. Chronic pain of both shoulders  M25.511 719.41    G89.29 338.29    M25.512      Patient History     Row Name 21 1345             History    Chief Complaint  Difficulty with daily activities;Joint stiffness;Muscle tenderness;Muscle weakness;Pain;Impaired sensation  -MM      Type of Pain  Shoulder pain  -MM      Date Current Problem(s) Began  20  -MM      Brief Description of Current Complaint  Client presents with bilateral shoulder pain left greater than right.  In December and February she fell a few times due to slipping on ice.  She also had some falls due to foot drop.  She fell and caught  herself with her arms behind her.  She reports pain in both shoulders.  She is now wearing an AFO for her foot.  Foot drop has been present for about a year since she had laminectomy surgery on her lumbar spine.  -MM      Patient/Caregiver Goals  Relieve pain;Improve mobility;Improve strength  -MM      Hand Dominance  left-handed  -MM      Occupation/sports/leisure activities  Enjoys drawing  -MM      What clinical tests have you had for this problem?  X-ray  -MM      Results of Clinical Tests  no signs of bony abnormality  -MM         Pain     Pain Location  Shoulder  -MM      Pain at Present  5  -MM      Pain at Best  2  -MM      Pain at Worst  8  -MM      Pain Frequency  Constant/continuous  -MM      Pain Description  Discomfort;Sharp;Aching;Dull  -MM      Pain Comments  sharp pain with mobility. dull ache at rest  -MM      Difficulties with ADL's?  sleeping, dressing, overhead activity, showering, reaching into cabinets  -MM         Fall Risk Assessment    Any falls in the past year:  Yes  -MM      Number of falls reported in the last 12 months  4  -MM      Factors that contributed to the fall:  -- related to drop foot before having AFO. slipping on ice  -MM      Does patient have a fear of falling  No  -MM         Daily Activities    Primary Language  English  -MM      Barriers to learning  None  -MM      Pt Participated in POC and Goals  Yes  -MM        User Key  (r) = Recorded By, (t) = Taken By, (c) = Cosigned By    Initials Name Provider Type    MM Jhonatan Diallo, PT Physical Therapist          PT Ortho     Row Name 05/20/21 4958       Precautions and Contraindications    Precautions/Limitations  no known precautions/limitations  -MM       Posture/Observations    Posture/Observations Comments  forward/rounded shoulders. Palpation: tenderness bilateral lateral subacromial region.  -MM       General ROM    RT Upper Ext  Rt Shoulder ABduction;Rt Shoulder Flexion;Rt Shoulder Extension;Rt Shoulder External  Rotation;Rt Shoulder Internal Rotation  -MM    LT Upper Ext  Lt Shoulder ABduction;Lt Shoulder Extension;Lt Shoulder Flexion;Lt Shoulder External Rotation;Lt Shoulder Internal Rotation  -MM       Right Upper Ext    Rt Shoulder Abduction AROM  117  -MM    Rt Shoulder Extension AROM  37  -MM    Rt Shoulder Flexion AROM  123  -MM    Rt Shoulder External Rotation AROM  32  -MM    Rt Shoulder Internal Rotation AROM  R LS region  -MM       Left Upper Ext    Lt Shoulder Abduction AROM  76  -MM    Lt Shoulder Extension AROM  30  -MM    Lt Shoulder Flexion AROM  100  -MM    Lt Shoulder External Rotation AROM  20  -MM    Lt Shoulder Internal Rotation AROM  mid gluteal region  -MM       MMT (Manual Muscle Testing)    Rt Upper Ext  Rt Shoulder Flexion;Rt Shoulder ABduction;Rt Shoulder Extension;Rt Shoulder Internal Rotation;Rt Shoulder External Rotation  -MM    Lt Upper Ext  Lt Shoulder Flexion;Lt Shoulder Extension;Lt Shoulder ABduction;Lt Shoulder Internal Rotation;Lt Shoulder External Rotation  -MM        Strength Right    Right  Test 1  39  -MM     Strength Average Right  39  -MM        Strength Left    Left  Test 1  39  -MM     Strength Average Left  39  -MM       Sensation    Additional Comments  frequent numbness to 2nd/3rd digit of left hand  -MM       Hand  Strength     Strength Affected Side  Bilateral  -MM      User Key  (r) = Recorded By, (t) = Taken By, (c) = Cosigned By    Initials Name Provider Type    MM Jhonatan Diallo, PT Physical Therapist      Therapy Education  Education Details: HEP: Pulleys flexion, scaption, table slides flexion, table slides abduction, cane external rotation, shoulder rolls reverse, scapular retraction.  Given: HEP  Program: New  How Provided: Verbal  Provided to: Patient  Level of Understanding: Teach back education performed     PT OP Goals     Row Name 05/20/21 1345          PT Short Term Goals    STG Date to Achieve  06/10/21  -MM     STG 1  Client  will report 50% improvement in shoulder pain with daily activity.  -MM     STG 1 Progress  New  -MM     STG 2  Client independent with home program to maximize mobility.  -MM     STG 2 Progress  New  -MM     STG 3  Left hand numbness will resolve.  -MM     STG 3 Progress  New  -MM     STG 4  Bilateral shoulder flexion will improve to 130 degrees active range of motion.  -MM     STG 4 Progress  New  -MM        Long Term Goals    LTG Date to Achieve  07/01/21  -MM     LTG 1  Client will report 90% improvement shoulder pain with daily activity.  -MM     LTG 1 Progress  New  -MM     LTG 2  QuickDASH score will improve to 30% or better.  -MM     LTG 2 Progress  New  -MM     LTG 3  Bilateral shoulder active range of motion flexion will improve to 145 degrees or better.  -MM     LTG 3 Progress  New  -MM     LTG 4  Client will tolerate lifting 2 pounds overhead with out difficulty.  -MM     LTG 4 Progress  New  -MM        Time Calculation    PT Goal Re-Cert Due Date  08/18/21  -MM       User Key  (r) = Recorded By, (t) = Taken By, (c) = Cosigned By    Initials Name Provider Type    MM Jhonatan Diallo, PT Physical Therapist          PT Assessment/Plan     Row Name 05/20/21 0089          PT Assessment    Functional Limitations  Limitation in home management;Performance in self-care ADL;Performance in leisure activities  -MM     Impairments  Muscle strength;Pain;Range of motion  -MM     Assessment Comments  Client presents with evolving symptoms of low complexity. Signs and symptoms are consistent with bilateral shoulder pain L > R. Symptoms started after falling multiple times December 2020. She was falling due to foot drop and now she is walking better by using an AFO. Foot drop was noted after lumbar laminectomy a year ago. Shoulder pain is present with AROM and PROM and both are limited with pain. She has signs and symptoms of bursitis and adhesive capsulitis cannot be ruled out. Skilled care is required to maximize  mobility and minimize pain.   -MM     Please refer to paper survey for additional self-reported information  Yes  -MM     Rehab Potential  Good  -MM     Patient/caregiver participated in establishment of treatment plan and goals  Yes  -MM     Patient would benefit from skilled therapy intervention  Yes  -MM        PT Plan    PT Frequency  2x/week  -MM     Predicted Duration of Therapy Intervention (PT)  8-10 visits  -MM     Planned CPT's?  PT EVAL LOW COMPLEXITY: 59827;PT THER PROC EA 15 MIN: 61309;PT THER ACT EA 15 MIN: 95576;PT MANUAL THERAPY EA 15 MIN: 54735  -MM     PT Plan Comments  continue per plan of treatment with gentle mobility exercises and consider ASTYM for left shoulder.  -MM       User Key  (r) = Recorded By, (t) = Taken By, (c) = Cosigned By    Initials Name Provider Type    Jhonatan Chong, PT Physical Therapist      Outcome Measure Options: Quick DASH  Quick DASH  Open a tight or new jar.: Severe Difficulty  Do heavy household chores (e.g., wash walls, wash floors): Unable  Carry a shopping bag or briefcase: Moderate Difficulty  Wash your back: Unable  Use a knife to cut food: Mild Difficulty  Recreational activities in which you take some force or impact through your arm, should or hand (e.g. golf, hammering, tennis, etc.): Unable  During the past week, to what extent has your arm, shoulder, or hand problem interfered with your normal social activites with family, friends, neighbors or groups?: Quite a bit  During the past week, were you limited in your work or other regular daily activities as a result of your arm, shoulder or hand problem?: Very limited  Arm, Shoulder, or hand pain: Severe  Tingling (pins and needles) in your arm, shoulder, or hand: Moderate  During the past week, how much difficulty have you had sleeping because of the pain in your arm, shoulder or hand?: Severe Difficulty  Number of Questions Answered: 11  Quick DASH Score: 72.73     Time Calculation:     Start Time:  1345  Untimed Charges  PT Eval/Re-eval Minutes: 60  Total Minutes  Untimed Charges Total Minutes: 60   Total Minutes: 60     Therapy Charges for Today     Code Description Service Date Service Provider Modifiers Qty    31945311360 HC PT EVAL LOW COMPLEXITY 4 5/20/2021 Jhonatan Diallo, PT GP 1          PT G-Codes  Outcome Measure Options: Quick DASH  Quick DASH Score: 72.73         Jhonatan Diallo, PT  5/21/2021

## 2021-05-28 ENCOUNTER — HOSPITAL ENCOUNTER (OUTPATIENT)
Dept: PHYSICAL THERAPY | Facility: HOSPITAL | Age: 36
Setting detail: THERAPIES SERIES
Discharge: HOME | End: 2021-05-28

## 2021-05-28 DIAGNOSIS — M25.512 CHRONIC PAIN OF BOTH SHOULDERS: Primary | ICD-10-CM

## 2021-05-28 DIAGNOSIS — M25.511 CHRONIC PAIN OF BOTH SHOULDERS: Primary | ICD-10-CM

## 2021-05-28 DIAGNOSIS — G89.29 CHRONIC PAIN OF BOTH SHOULDERS: Primary | ICD-10-CM

## 2021-05-28 PROCEDURE — 97110 THERAPEUTIC EXERCISES: CPT | Performed by: GENERAL ACUTE CARE HOSPITAL

## 2021-05-28 NOTE — THERAPY TREATMENT NOTE
Outpatient Physical Therapy Ortho Treatment Note   [facility]     Patient Name: Marisol Cuellar  : 1985  MRN: [id number]  Today's Date: 2021      Visit Date: 2021    Visit Dx:    ICD-10-CM ICD-9-CM   1. Chronic pain of both shoulders  M25.511 719.41    G89.29 338.29    M25.512        Patient Active Problem List   Diagnosis   • Hyperglycemia   • Nausea and vomiting due to hyperglycemia   • Generalized pain   • Chest pain   • Hyponatremia   • Type 1 diabetes mellitus without complication (CMS/HCC)   • hx of Graves disease, now hypothyroid   • IV drug abuse (CMS/HCC)   • Multiple allergies   • Postablative hypothyroidism   • Microalbuminuria due to type 1 diabetes mellitus (CMS/HCC)   • Elevated transaminase level   • Anxiety and depression        Past Medical History:   Diagnosis Date   • Anxiety    • Chronic low back pain    • Depression    • Diabetes mellitus (CMS/HCC)     TYPE 1   • Graves disease    • Hep C     was treated    • Herniated lumbar intervertebral disc    • History of Papanicolaou smear of cervix ?    old PCP. Dr. Lee   • IV drug abuse (CMS/HCC)         Past Surgical History:   Procedure Laterality Date   •  SECTION       and    • ENDOMETRIAL ABLATION  2006   • FOOT SURGERY Right 2014    METAL DINA PLACED IN FOOT   • LAMINECTOMY  2020    lower lubmar laminectomy   • SINUS SURGERY      ,    • TONSILLECTOMY                         PT Assessment/Plan     Row Name 21 1030          PT Assessment    Assessment Comments  Pt did well with exercises but had mild complaints of pain and soreness. She continues to present with L shoulder pain> R shoulder. Her pain is increased the most with shoulder ext and ER. Her signs and symptoms are consistent with potential frozen shoulder, especially with co-morbidity of diabetes.  -HP        PT Plan    PT Plan Comments  Continue per POC and progressing pt as necessary  -HP       User Key  (r) = Recorded By,  (t) = Taken By, (c) = Cosigned By    Initials Name Provider Type     Zack Randolph PT Physical Therapist            OP Exercises     Row Name 05/28/21 1030             Subjective Comments    Subjective Comments  Pt stated that the exercises she was doing at home made her arm sore  -         Subjective Pain    Able to rate subjective pain?  yes  -HP      Pre-Treatment Pain Level  5  -HP      Post-Treatment Pain Level  5  -HP         Total Minutes    16374 - PT Therapeutic Exercise Minutes  30  -HP         Exercise 1    Exercise Name 1  Pulleys flexion  -HP      Time 1  5 min  -HP         Exercise 2    Exercise Name 2  Seated bilateral shoulder table slides: flexion, scaption, abd, CW circles, CCW circles  -HP      Reps 2  20 each  -HP         Exercise 3    Exercise Name 3  Seated yellow band: rows, tricep ext, bicep curls, band pull aparts  -HP      Reps 3  10 each  -HP         Exercise 4    Exercise Name 4  Bilateral shoulder sidelying: ER, abd, flexion/ext  -HP      Reps 4  15 each  -HP        User Key  (r) = Recorded By, (t) = Taken By, (c) = Cosigned By    Initials Name Provider Type     Zack Randolph PT Physical Therapist                                          Time Calculation:   Start Time: 1030  Timed Charges  69846 - PT Therapeutic Exercise Minutes: 30  Total Minutes  Timed Charges Total Minutes: 30   Total Minutes: 30  Therapy Charges for Today     Code Description Service Date Service Provider Modifiers Qty    [id number]  PT THER PROC EA 15 MIN 5/28/2021 Zack Randolph, PT GP 2                    Zack Randolph PT  5/28/2021

## 2021-06-04 ENCOUNTER — HOSPITAL ENCOUNTER (OUTPATIENT)
Dept: PHYSICAL THERAPY | Facility: HOSPITAL | Age: 36
Setting detail: THERAPIES SERIES
Discharge: HOME | End: 2021-06-04

## 2021-06-04 ENCOUNTER — TELEPHONE (OUTPATIENT)
Dept: INTERNAL MEDICINE | Facility: CLINIC | Age: 36
End: 2021-06-04

## 2021-06-04 DIAGNOSIS — M25.512 CHRONIC PAIN OF BOTH SHOULDERS: Primary | ICD-10-CM

## 2021-06-04 DIAGNOSIS — G89.29 CHRONIC PAIN OF BOTH SHOULDERS: Primary | ICD-10-CM

## 2021-06-04 DIAGNOSIS — M25.519 SHOULDER PAIN, UNSPECIFIED CHRONICITY, UNSPECIFIED LATERALITY: Primary | ICD-10-CM

## 2021-06-04 DIAGNOSIS — M25.511 CHRONIC PAIN OF BOTH SHOULDERS: Primary | ICD-10-CM

## 2021-06-04 DIAGNOSIS — M25.619 DECREASED RANGE OF MOTION OF SHOULDER, UNSPECIFIED LATERALITY: ICD-10-CM

## 2021-06-04 PROCEDURE — 97110 THERAPEUTIC EXERCISES: CPT

## 2021-06-04 NOTE — THERAPY TREATMENT NOTE
Outpatient Physical Therapy Ortho Treatment Note  [facility]     Patient Name: Marisol Cuellar  : 1985  MRN: [id number]  Today's Date: 2021      Visit Date: 2021    Visit Dx:    ICD-10-CM ICD-9-CM   1. Chronic pain of both shoulders  M25.511 719.41    G89.29 338.29    M25.512        Patient Active Problem List   Diagnosis   • Hyperglycemia   • Nausea and vomiting due to hyperglycemia   • Generalized pain   • Chest pain   • Hyponatremia   • Type 1 diabetes mellitus without complication (CMS/HCC)   • hx of Graves disease, now hypothyroid   • IV drug abuse (CMS/HCC)   • Multiple allergies   • Postablative hypothyroidism   • Microalbuminuria due to type 1 diabetes mellitus (CMS/HCC)   • Elevated transaminase level   • Anxiety and depression        Past Medical History:   Diagnosis Date   • Anxiety    • Chronic low back pain    • Depression    • Diabetes mellitus (CMS/HCC)     TYPE 1   • Graves disease    • Hep C     was treated    • Herniated lumbar intervertebral disc    • History of Papanicolaou smear of cervix ?    old PCP. Dr. Lee   • IV drug abuse (CMS/HCC)         Past Surgical History:   Procedure Laterality Date   •  SECTION       and    • ENDOMETRIAL ABLATION  2006   • FOOT SURGERY Right 2014    METAL DINA PLACED IN FOOT   • LAMINECTOMY  2020    lower lubmar laminectomy   • SINUS SURGERY      ,    • TONSILLECTOMY  2006       PT Ortho     Row Name 21 1025       Subjective Pain    Post-Treatment Pain Level  4  (Pended)   -      User Key  (r) = Recorded By, (t) = Taken By, (c) = Cosigned By    Initials Name Provider Type    Mina Javier, PT Student PT Student            PT Assessment/Plan     Row Name 21 1100          PT Assessment    Assessment Comments  Client tolerated all activities well today despite report of increased shoulder pain bilaterally. Client demonstrated improved ROM in all directions. Client also demonstrated  improved active shoulder strength during ther ex in pain-free ROM.  (Pended)   -WL        PT Plan    PT Plan Comments  Continue per POC and progressing pt as necessary  (Pended)   -WL       User Key  (r) = Recorded By, (t) = Taken By, (c) = Cosigned By    Initials Name Provider Type    Mina Javier, PT Student PT Student        OP Exercises     Row Name 06/04/21 1025             Subjective Comments    Subjective Comments  Client reports her pain is a 6/10 in both shoulders, which she states may be due to putting her sweater on this morning which caused more pain.  (Pended)   -WL         Subjective Pain    Able to rate subjective pain?  yes  (Pended)   -WL      Pre-Treatment Pain Level  6  (Pended)   -WL      Post-Treatment Pain Level  4  (Pended)   -WL         Total Minutes    32739 - PT Therapeutic Exercise Minutes  45  (Pended)   -WL         Exercise 1    Exercise Name 1  UBE  (Pended)   -WL      Time 1  4 min  (Pended)   -WL         Exercise 2    Exercise Name 2  Seated bilateral shoulder table slides: flexion, scaption, abd, CW circles, CCW circles  (Pended)   -WL      Reps 2  20 each  (Pended)   -WL         Exercise 3    Exercise Name 3  Standing shoulder flexion/abduction  (Pended)   -WL      Reps 3  15 each  (Pended)   -WL         Exercise 4    Exercise Name 4  AAROM w cane bilateral shoulder sidelying: ER, abd, flexion/ext  (Pended)   -WL      Reps 4  15 each  (Pended)   -WL        User Key  (r) = Recorded By, (t) = Taken By, (c) = Cosigned By    Initials Name Provider Type    Mina Javier, PT Student PT Student        Time Calculation:   Timed Charges  51804 - PT Therapeutic Exercise Minutes: (P) 45  Total Minutes  Timed Charges Total Minutes: (P) 45   Total Minutes: (P) 45  Therapy Charges for Today     Code Description Service Date Service Provider Modifiers Qty    12270093592 HC PT THER PROC EA 15 MIN 6/4/2021 Mina Hernandez, PT Student GP 3          Mina Hernandez, PT  Student  6/4/2021

## 2021-06-04 NOTE — TELEPHONE ENCOUNTER
Caller: Marisol Cuellar    Relationship: Self    Best call back number: [phone]    What is the medical concern/diagnosis: SHOULDER    What specialty or service is being requested:ORTHOPEDIC    What is the provider, practice or medical service name: ANY    What is the office location: ANY    What is the office phone number: ANY    Any additional details: PATIENT IS WANTING TO GET INJECTIONS IN THE SHOULDER

## 2021-06-11 ENCOUNTER — HOSPITAL ENCOUNTER (OUTPATIENT)
Dept: PHYSICAL THERAPY | Facility: HOSPITAL | Age: 36
Setting detail: THERAPIES SERIES
Discharge: HOME | End: 2021-06-11

## 2021-06-11 DIAGNOSIS — G89.29 CHRONIC PAIN OF BOTH SHOULDERS: Primary | ICD-10-CM

## 2021-06-11 DIAGNOSIS — M25.511 CHRONIC PAIN OF BOTH SHOULDERS: Primary | ICD-10-CM

## 2021-06-11 DIAGNOSIS — M25.512 CHRONIC PAIN OF BOTH SHOULDERS: Primary | ICD-10-CM

## 2021-06-11 PROCEDURE — 97110 THERAPEUTIC EXERCISES: CPT

## 2021-06-11 NOTE — THERAPY TREATMENT NOTE
Outpatient Physical Therapy Ortho Treatment Note  [facility]     Patient Name: Marisol Cuellar  : 1985  MRN: [id number]  Today's Date: 2021      Visit Date: 2021    Visit Dx:    ICD-10-CM ICD-9-CM   1. Chronic pain of both shoulders  M25.511 719.41    G89.29 338.29    M25.512      Patient Active Problem List   Diagnosis   • Hyperglycemia   • Nausea and vomiting due to hyperglycemia   • Generalized pain   • Chest pain   • Hyponatremia   • Type 1 diabetes mellitus without complication (CMS/HCC)   • hx of Graves disease, now hypothyroid   • IV drug abuse (CMS/HCC)   • Multiple allergies   • Postablative hypothyroidism   • Microalbuminuria due to type 1 diabetes mellitus (CMS/HCC)   • Elevated transaminase level   • Anxiety and depression        Past Medical History:   Diagnosis Date   • Anxiety    • Chronic low back pain    • Depression    • Diabetes mellitus (CMS/HCC)     TYPE 1   • Graves disease    • Hep C     was treated    • Herniated lumbar intervertebral disc    • History of Papanicolaou smear of cervix ?    old PCP. Dr. Lee   • IV drug abuse (CMS/HCC)         Past Surgical History:   Procedure Laterality Date   •  SECTION       and    • ENDOMETRIAL ABLATION  2006   • FOOT SURGERY Right 2014    METAL DINA PLACED IN FOOT   • LAMINECTOMY  2020    lower lubmar laminectomy   • SINUS SURGERY      ,    • TONSILLECTOMY  2006       PT Ortho     Row Name 21 1200       Posture/Observations    Posture/Observations Comments  Palpation: no TTP to biceps muscle belly or insertion, anterior, leteral, or posterior aspects of glenohumeral joint bilaterall, or along clavicles  (Pended)   -PAOLA      User Key  (r) = Recorded By, (t) = Taken By, (c) = Cosigned By    Initials Name Provider Type    Mina Javier, PT Student PT Student          PT Assessment/Plan     Row Name 21 1200          PT Assessment    Assessment Comments  Client tolerated all  activities well today. Client demonstrated improvement in pain free ROM in shoulder flexion and scaption, but required assist at scapulae for increased ROM in scaption due to scapular dyskinesia. Client demonstrated improvements in strength bilaterally, but is still limited due to pain.  (Pended)   -WL        PT Plan    PT Plan Comments  Continue per POC and progressing pt as necessary  (Pended)   -WL       User Key  (r) = Recorded By, (t) = Taken By, (c) = Cosigned By    Initials Name Provider Type    WL Mina Hernandez, PT Student PT Student          OP Exercises     Row Name 06/11/21 1200             Subjective Comments    Subjective Comments  Client denies pain upon arrival or when at rest. Client reports that she only experiences pain during activity.  (Pended)   -WL         Subjective Pain    Able to rate subjective pain?  yes  (Pended)   -WL      Pre-Treatment Pain Level  0  (Pended)   -WL      Subjective Pain Comment  Client reports the worst her pain has been in the last week is 6/10 and describes it as sharp.  (Pended)   -WL         Total Minutes    92681 - PT Therapeutic Exercise Minutes  30  (Pended)   -WL         Exercise 1    Exercise Name 1  UBE  (Pended)   -WL      Time 1  4 min  (Pended)   -WL         Exercise 2    Exercise Name 2  Seated bilateral shoulder table slides: flexion, scaption, abd, CW circles, CCW circles  (Pended)   -WL      Reps 2  20 each  (Pended)   -WL         Exercise 3    Exercise Name 3  Standing shoulder flexion/scaption  (Pended)   -WL      Sets 3  2  (Pended)   -WL      Reps 3  10  (Pended)   -WL         Exercise 4    Exercise Name 4  AAROM w cane bilateral shoulder sidelying: ER, abd, flexion/ext  (Pended)   -WL      Reps 4  15 each  (Pended)   -WL         Exercise 5    Exercise Name 5  rows w theraband  (Pended)   -WL      Sets 5  2  (Pended)   -WL      Reps 5  10  (Pended)   -WL      Additional Comments  yellow  (Pended)   -WL         Exercise 6    Exercise Name 6  no  $  (Pended)   -WL      Sets 6  2  (Pended)   -WL      Reps 6  10  (Pended)   -WL      Additional Comments  yellow  (Pended)   -WL        User Key  (r) = Recorded By, (t) = Taken By, (c) = Cosigned By    Initials Name Provider Type    Mina Javier, PT Student PT Student        Time Calculation:   Start Time: (P) 1200  Timed Charges  54848 - PT Therapeutic Exercise Minutes: (P) 30  Total Minutes  Timed Charges Total Minutes: (P) 30   Total Minutes: (P) 30  Therapy Charges for Today     Code Description Service Date Service Provider Modifiers Qty    08097279305  PT THER PROC EA 15 MIN 6/11/2021 Mina Hernandez, PT Student GP 2        Mina Hernandez, PT Student  6/11/2021

## 2021-06-17 ENCOUNTER — OFFICE VISIT (OUTPATIENT)
Dept: ORTHOPEDIC SURGERY | Facility: CLINIC | Age: 36
End: 2021-06-17

## 2021-06-17 VITALS
SYSTOLIC BLOOD PRESSURE: 126 MMHG | HEIGHT: 67 IN | HEART RATE: 89 BPM | WEIGHT: 162 LBS | BODY MASS INDEX: 25.43 KG/M2 | DIASTOLIC BLOOD PRESSURE: 83 MMHG

## 2021-06-17 DIAGNOSIS — M19.011 PRIMARY LOCALIZED OSTEOARTHROSIS OF RIGHT SHOULDER REGION: ICD-10-CM

## 2021-06-17 DIAGNOSIS — E10.618 DIABETIC FROZEN SHOULDER ASSOCIATED WITH TYPE 1 DIABETES MELLITUS (HCC): Primary | ICD-10-CM

## 2021-06-17 DIAGNOSIS — M75.52 BURSITIS OF LEFT SHOULDER: ICD-10-CM

## 2021-06-17 DIAGNOSIS — M75.42 IMPINGEMENT SYNDROME OF LEFT SHOULDER: ICD-10-CM

## 2021-06-17 DIAGNOSIS — M75.00 DIABETIC FROZEN SHOULDER ASSOCIATED WITH TYPE 1 DIABETES MELLITUS (HCC): Primary | ICD-10-CM

## 2021-06-17 DIAGNOSIS — M75.02 ADHESIVE CAPSULITIS OF LEFT SHOULDER: ICD-10-CM

## 2021-06-17 DIAGNOSIS — M75.01 ADHESIVE CAPSULITIS OF RIGHT SHOULDER: ICD-10-CM

## 2021-06-17 PROCEDURE — 99204 OFFICE O/P NEW MOD 45 MIN: CPT | Performed by: ORTHOPAEDIC SURGERY

## 2021-06-17 RX ORDER — INSULIN GLARGINE 100 [IU]/ML
INJECTION, SOLUTION SUBCUTANEOUS
COMMUNITY
Start: 2021-06-02

## 2021-06-17 RX ORDER — PROCHLORPERAZINE 25 MG/1
SUPPOSITORY RECTAL
COMMUNITY
Start: 2021-02-25

## 2021-06-17 RX ORDER — INSULIN LISPRO 100 U/ML
INJECTION, SOLUTION SUBCUTANEOUS
COMMUNITY
Start: 2021-05-27

## 2021-06-17 RX ORDER — TRAZODONE HYDROCHLORIDE 300 MG/1
TABLET ORAL
COMMUNITY
Start: 2021-06-15 | End: 2022-12-13

## 2021-06-17 RX ORDER — HYDROXYZINE PAMOATE 50 MG/1
CAPSULE ORAL
COMMUNITY
Start: 2021-04-21 | End: 2022-12-13

## 2021-06-17 RX ORDER — PROCHLORPERAZINE 25 MG/1
SUPPOSITORY RECTAL
COMMUNITY
Start: 2021-06-16

## 2021-06-17 RX ORDER — PROCHLORPERAZINE 25 MG/1
SUPPOSITORY RECTAL
COMMUNITY
Start: 2021-05-22

## 2021-06-17 NOTE — PROGRESS NOTES
[facility] Orthopaedic Surgery Office Visit - Dom Antoine MD    Office Visit       Patient Name: Marisol Cuellar    Chief Complaint:   Chief Complaint   Patient presents with   • Right Shoulder - Pain   • Left Shoulder - Pain       Referring Physician: Mindy Andrews AP* --I appreciate the referral    History of Present Illness:   Marisol Cuellar is a 35 y.o. female who presents with bilateral body part: shoulder Reason: pain.  Onset:Onset: mechanical fall. The issue has been ongoing for 7 month(s). Pain is a 6/10 on the pain scale. Pain is described as Pain Characterization: throbbing and stabbing. Associated symptoms include Symptoms: pain. The pain is worse with any movement of the joint; resting improve the pain. Previous treatments have included: NSAIDS and physical therapy.  I have reviewed the patient's history of present illness as noted/entered above.    I have reviewed the patient's past medical history, surgical history, social history, family history, medications, and allergies as noted in the electronic medical record and as noted/entered.  I have reviewed the patient's review of systems as noted/enter and updated as noted in the patient's HPI.    Bilateral shoulder pain: LEFT worse than right  [place]    Fall on ice, pain for 7 months; she notes limitations prior to the fall    Physical therapy with [facility]-PT notes reviewed noted improvements but persistent scapular dyskinesis and shoulder dysfunction; bursitis or Diabetic frozen shoulder discussed    Type 1 diabetes  Graves' disease  Hepatitis C    Current vaping, prior smoking, prior IV drug use sober since 5/15/2020     Enjoys: art    Subjective   Subjective      Review of Systems   Constitutional: Negative.  Negative for chills, fatigue and fever.   HENT: Negative.  Negative for congestion and dental problem.    Eyes: Negative.  Negative for blurred  "vision.   Respiratory: Negative.  Negative for shortness of breath.    Cardiovascular: Negative.  Negative for leg swelling.   Gastrointestinal: Negative.  Negative for abdominal pain.   Endocrine: Negative.  Negative for polyuria.   Genitourinary: Negative.  Negative for difficulty urinating.   Musculoskeletal: Positive for arthralgias.   Skin: Negative.    Allergic/Immunologic: Negative.    Neurological: Negative.    Hematological: Negative.  Negative for adenopathy.   Psychiatric/Behavioral: Negative.  Negative for behavioral problems.        Past Medical History:   Past Medical History:   Diagnosis Date   • Anxiety    • Chronic low back pain    • Depression    • Diabetes mellitus (CMS/HCC)     TYPE 1   • Graves disease    • Hep C     was treated    • Herniated lumbar intervertebral disc    • History of Papanicolaou smear of cervix ?    old PCP. Dr. Lee   • IV drug abuse (CMS/HCC)        Past Surgical History:   Past Surgical History:   Procedure Laterality Date   •  SECTION       and    • ENDOMETRIAL ABLATION     • FOOT SURGERY Right     METAL DNIA PLACED IN FOOT   • LAMINECTOMY  2020    lower lubmar laminectomy   • SINUS SURGERY      ,    • TONSILLECTOMY  2006       Family History:   Family History   Problem Relation Age of Onset   • Heart attack Father 57   • Diabetes type I Father    • Macular degeneration Mother    • Fibromyalgia Mother    • Arthritis Mother    • Diabetes type II Brother    • Diabetes type I Maternal Grandmother    • Cancer Maternal Grandfather         bladder   • No Known Problems Paternal Grandmother    • No Known Problems Paternal Grandfather    • No Known Problems Son    • Congenital heart disease Son         hypertrophic cardiomyopathy   • Other Son         \"nunan's\"       Social History:   Social History     Socioeconomic History   • Marital status: Single     Spouse name: Not on file   • Number of children: Not on file   • Years of education: " Not on file   • Highest education level: Not on file   Tobacco Use   • Smoking status: Former Smoker     Packs/day: 1.00     Years: 5.00     Pack years: 5.00     Types: Cigarettes     Start date:      Quit date: 2021     Years since quittin.1   • Smokeless tobacco: Never Used   Vaping Use   • Vaping Use: Every day   • Start date: 2021   • Substances: Nicotine, Flavoring   • Devices: Pre-filled or refillable cartridge   Substance and Sexual Activity   • Alcohol use: No   • Drug use: Yes     Types: IV     Comment: history of IV drug abuse (heroine, meth) and smoked crack; sober since 5/15/2020   • Sexual activity: Defer     Comment: single       Medications:   Current Outpatient Medications:   •  B-D ULTRAFINE III SHORT PEN 31G X 8 MM misc, USE UTD WITH TRESIBA, Disp: , Rfl: 3  •  busPIRone (BUSPAR) 15 MG tablet, Take 15 mg by mouth 2 (two) times a day., Disp: , Rfl:   •  Continuous Blood Gluc  (Dexcom G6 ) device, , Disp: , Rfl:   •  Continuous Blood Gluc Sensor (Dexcom G6 Sensor), , Disp: , Rfl:   •  Continuous Blood Gluc Transmit (Dexcom G6 Transmitter) misc, , Disp: , Rfl:   •  escitalopram (LEXAPRO) 20 MG tablet, Take 20 mg by mouth Daily., Disp: , Rfl:   •  glucose blood (Kavitha Contour Test) test strip, Check your blood sugars 4 (four) times daily., Disp: 200 each, Rfl: 11  •  hydrOXYzine pamoate (VISTARIL) 50 MG capsule, , Disp: , Rfl:   •  Insulin Degludec (TRESIBA SC), Inject 25 Units under the skin into the appropriate area as directed., Disp: , Rfl:   •  Insulin Lispro (ADMELOG SOLOSTAR) 100 UNIT/ML injection pen, , Disp: , Rfl:   •  insulin lispro (Admelog) 100 UNIT/ML injection, Inject  under the skin into the appropriate area as directed 3 (Three) Times a Day Before Meals. Sliding scale, Disp: , Rfl:   •  Lantus SoloStar 100 UNIT/ML injection pen, , Disp: , Rfl:   •  levothyroxine (SYNTHROID, LEVOTHROID) 300 MCG tablet, Take 1 tablet by mouth Daily., Disp: 30 tablet, Rfl:  "6  •  traZODone (DESYREL) 300 MG tablet, , Disp: , Rfl:     Allergies:   Allergies   Allergen Reactions   • Peanut-Containing Drug Products Other (See Comments)     Coughing and tightness in chest   • Shellfish-Derived Products Other (See Comments)     Itchy throat and tightness   • Sulfa Antibiotics Anaphylaxis   • Dairy Aid [Lactase] Rash     migraines   • Eggs Or Egg-Derived Products Rash     migraines   • Soybean-Containing Drug Products Rash     migraines   • Wheat Extract Rash     migraines       The following portions of the patient's history were reviewed and updated as appropriate: allergies, current medications, past family history, past medical history, past social history, past surgical history and problem list.        Objective    Objective      Vital Signs:   Vitals:    06/17/21 1543   BP: 126/83   Pulse: 89   Weight: 73.5 kg (162 lb)   Height: 170.2 cm (67.01\")       Ortho Exam:  General: no acute distress, comfortable  Vitals reviewed in chart  Head: normocephalic, atraumatic  SLT intact distally, well-perfused  Baseline PIN issue suspected of the left hand but good wrist extension    Musculoskeletal Exam    SIDE: Bilateral shoulders left worse than right  Shoulder Exam:  Range of motion measurements (degrees)  Forward flexion/Abduction/External rotation at side/ER at 90/IR at 90/IR position  Active: 120/120/30/70/40/buttock  Passive: 130/130/40/70/40/buttock  Left worse than right with regards to range of motion    Diminished internal rotation and external rotation  Painful arc of motion  No evidence of septic joint  Pain with forward flexion and abduction greater than 80  Impingement testing Neer's test - positive/painful  Impingement testing Hawkin's test - positive/painful  Rotator cuff testing Iris's test - mild pain but no obvious weakness, pain limited  Rotator cuff testing External rotation - no weakness  Rotator cuff testing Lag signs - absent  Rotator cuff testing Belly press - " negative  Scapular dyskinesis - present, abnormal scapular motion      Results Review:   Imaging Results (Last 24 Hours)     ** No results found for the last 24 hours. **        XR Shoulder 2+ View Bilateral    Result Date: 4/23/2021  No acute bony abnormality.  D:  04/23/2021 E:  04/23/2021  This report was finalized on 4/23/2021 4:36 PM by Dr. Irene Winslow MD.        I personally reviewed the bilateral shoulder views 6 images 3 of each shoulder completed 4/23/2021 as noted above.  No acute bony changes were noted.    Procedures           Assessment / Plan      Assessment/Plan:   Problem List Items Addressed This Visit        Musculoskeletal and Injuries    Diabetic frozen shoulder associated with type 1 diabetes mellitus (CMS/[facility]) - Primary    Relevant Medications    Insulin Degludec (TRESIBA SC)    insulin lispro (Admelog) 100 UNIT/ML injection    Lantus SoloStar 100 UNIT/ML injection pen    Insulin Lispro (ADMELOG SOLOSTAR) 100 UNIT/ML injection pen    Other Relevant Orders    Ambulatory Referral to Physical Therapy Ortho, Evaluate and treat    MRI Shoulder Left Without Contrast    Adhesive capsulitis of left shoulder    Relevant Orders    Ambulatory Referral to Physical Therapy Ortho, Evaluate and treat    MRI Shoulder Left Without Contrast    Primary localized osteoarthrosis of right shoulder region    Bursitis of left shoulder    Relevant Orders    Ambulatory Referral to Physical Therapy Ortho, Evaluate and treat    MRI Shoulder Left Without Contrast    Impingement syndrome of left shoulder    Relevant Orders    Ambulatory Referral to Physical Therapy Ortho, Evaluate and treat    MRI Shoulder Left Without Contrast    Adhesive capsulitis of right shoulder          LEFT worse than right --fibrotic frozen shoulders.    MRI indicated on the left shoulder for refractory pain despite conservative measures and rule out underlying rotator cuff pathology.    Selective rest/activity modifications recommended  while the shoulder recovers, although stretching and physical therapy are encouraged.      Physical therapy prescription provided and stretching program discussed    Steroid injection -I think this will help but we will await the results of the MRI to be sure no additional tearing is noted    Patient counseling was performed with a discussion of the following:  What is a frozen shoulder?  Frozen Shoulder or Idiopathic Adhesive Capsulitis - the patient has a diagnosis of idiopathic adhesive capsulitis or “frozen shoulder.”  We discussed that this condition can have variable “freezing” and “thawing” phases that can be very painful.  Fortunately, this condition rarely requires operative intervention and responds to conservative measures gradually over time.  Unfortunately, I did  that the symptoms can last up to 6-12 months in some patients and frozen shoulder can return to the same shoulder or impact the other shoulder in the future.    What is our plan to help nonoperatively treat frozen shoulder?  Continue with physical therapy, MRI indicated    When will I see the patient back?  After MRI    Is an MRI needed at this time?   Yes given refractory course this is recommended      Follow Up: After left shoulder MRI        Dom Antoine MD, FAAOS  Orthopedic Surgeon  Fellowship Trained Shoulder and Elbow Surgeon  [facility]  Orthopedics and Sports Medicine  [address] [place] [address], [address] [place] [address]. [phone]    06/17/21  16:26 EDT    Please note that portions of this note may have been completed with a voice recognition program. Efforts were made to edit the dictations, but occasionally words are mistranscribed.

## 2021-06-18 ENCOUNTER — HOSPITAL ENCOUNTER (OUTPATIENT)
Dept: PHYSICAL THERAPY | Facility: HOSPITAL | Age: 36
Setting detail: THERAPIES SERIES
Discharge: HOME | End: 2021-06-18

## 2021-06-18 ENCOUNTER — OFFICE VISIT (OUTPATIENT)
Dept: INTERNAL MEDICINE | Facility: CLINIC | Age: 36
End: 2021-06-18

## 2021-06-18 VITALS
HEIGHT: 67 IN | RESPIRATION RATE: 16 BRPM | OXYGEN SATURATION: 98 % | TEMPERATURE: 98 F | BODY MASS INDEX: 26.53 KG/M2 | WEIGHT: 169 LBS | SYSTOLIC BLOOD PRESSURE: 112 MMHG | HEART RATE: 85 BPM | DIASTOLIC BLOOD PRESSURE: 70 MMHG

## 2021-06-18 DIAGNOSIS — L30.9 DERMATITIS: Primary | ICD-10-CM

## 2021-06-18 DIAGNOSIS — M25.511 CHRONIC PAIN OF BOTH SHOULDERS: Primary | ICD-10-CM

## 2021-06-18 DIAGNOSIS — M25.512 CHRONIC PAIN OF BOTH SHOULDERS: Primary | ICD-10-CM

## 2021-06-18 DIAGNOSIS — D22.72 NEVUS OF LEFT LOWER LEG: ICD-10-CM

## 2021-06-18 DIAGNOSIS — G89.29 CHRONIC PAIN OF BOTH SHOULDERS: Primary | ICD-10-CM

## 2021-06-18 PROCEDURE — 99213 OFFICE O/P EST LOW 20 MIN: CPT | Performed by: NURSE PRACTITIONER

## 2021-06-18 PROCEDURE — 97110 THERAPEUTIC EXERCISES: CPT

## 2021-06-18 RX ORDER — TRIAMCINOLONE ACETONIDE 5 MG/G
OINTMENT TOPICAL 2 TIMES DAILY
Qty: 45 G | Refills: 2 | Status: SHIPPED | OUTPATIENT
Start: 2021-06-18

## 2021-06-18 NOTE — PROGRESS NOTES
Subjective   Marisol Cuellar is a 35 y.o. female    Chief Complaint   Patient presents with   • Rash     thinks it could be psoriasis, 2 lestions on both shins, blistery, red, itchy patches. Using conconut oil     Rash  This is a recurrent problem. The current episode started more than 1 month ago. The problem has been gradually worsening since onset. The affected locations include the left lower leg and right lower leg. The rash is characterized by itchiness, redness and scaling. It is unknown if there was an exposure to a precipitant. Pertinent negatives include no anorexia, congestion, cough, diarrhea, eye pain, facial edema, fatigue, fever, joint pain, nail changes, rhinorrhea, shortness of breath, sore throat or vomiting. Treatments tried: coconut oil. The treatment provided mild relief. There is no history of allergies, asthma, eczema or varicella.        The following portions of the patient's history were reviewed and updated as appropriate: allergies, current medications, past family history, past medical history, past social history, past surgical history and problem list.    Current Outpatient Medications:   •  B-D ULTRAFINE III SHORT PEN 31G X 8 MM misc, USE UTD WITH TRESIBA, Disp: , Rfl: 3  •  busPIRone (BUSPAR) 15 MG tablet, Take 15 mg by mouth 2 (two) times a day., Disp: , Rfl:   •  Continuous Blood Gluc  (Dexcom G6 ) device, , Disp: , Rfl:   •  Continuous Blood Gluc Sensor (Dexcom G6 Sensor), , Disp: , Rfl:   •  Continuous Blood Gluc Transmit (Dexcom G6 Transmitter) misc, , Disp: , Rfl:   •  escitalopram (LEXAPRO) 20 MG tablet, Take 20 mg by mouth Daily., Disp: , Rfl:   •  glucose blood (Kavitha Contour Test) test strip, Check your blood sugars 4 (four) times daily., Disp: 200 each, Rfl: 11  •  hydrOXYzine pamoate (VISTARIL) 50 MG capsule, , Disp: , Rfl:   •  Insulin Degludec (TRESIBA SC), Inject 25 Units under the skin into the appropriate area as directed., Disp: , Rfl:   •  Insulin  Lispro (ADMELOG SOLOSTAR) 100 UNIT/ML injection pen, , Disp: , Rfl:   •  insulin lispro (Admelog) 100 UNIT/ML injection, Inject  under the skin into the appropriate area as directed 3 (Three) Times a Day Before Meals. Sliding scale, Disp: , Rfl:   •  Lantus SoloStar 100 UNIT/ML injection pen, , Disp: , Rfl:   •  levothyroxine (SYNTHROID, LEVOTHROID) 300 MCG tablet, Take 1 tablet by mouth Daily., Disp: 30 tablet, Rfl: 6  •  traZODone (DESYREL) 300 MG tablet, , Disp: , Rfl:   •  triamcinolone (KENALOG) 0.5 % ointment, Apply  topically to the appropriate area as directed 2 (Two) Times a Day., Disp: 45 g, Rfl: 2     Review of Systems   Constitutional: Negative for chills, fatigue and fever.   HENT: Negative for congestion, rhinorrhea and sore throat.    Eyes: Negative for pain.   Respiratory: Negative for cough, chest tightness and shortness of breath.    Cardiovascular: Negative for chest pain.   Gastrointestinal: Negative for abdominal pain, anorexia, diarrhea, nausea and vomiting.   Endocrine: Negative for cold intolerance and heat intolerance.   Musculoskeletal: Negative for arthralgias and joint pain.   Skin: Positive for rash. Negative for nail changes.   Neurological: Negative for dizziness.       Objective   Physical Exam  Constitutional:       Appearance: She is well-developed.   HENT:      Head: Normocephalic and atraumatic.   Eyes:      Conjunctiva/sclera: Conjunctivae normal.      Pupils: Pupils are equal, round, and reactive to light.   Cardiovascular:      Rate and Rhythm: Normal rate and regular rhythm.      Heart sounds: Normal heart sounds.   Pulmonary:      Effort: Pulmonary effort is normal.      Breath sounds: Normal breath sounds.   Abdominal:      General: Bowel sounds are normal.      Palpations: Abdomen is soft.   Musculoskeletal:         General: Normal range of motion.      Cervical back: Normal range of motion.   Skin:     General: Skin is warm and dry.      Findings: Rash present. Rash is  "macular and papular.      Comments: Several very dark/black moles noted on BLE's   Neurological:      Mental Status: She is alert and oriented to person, place, and time.      Deep Tendon Reflexes: Reflexes are normal and symmetric.   Psychiatric:         Behavior: Behavior normal.         Thought Content: Thought content normal.         Judgment: Judgment normal.       Vitals:    06/18/21 1302   BP: 112/70   BP Location: Left arm   Patient Position: Sitting   Cuff Size: Adult   Pulse: 85   Resp: 16   Temp: 98 °F (36.7 °C)   TempSrc: Infrared   SpO2: 98%   Weight: 76.7 kg (169 lb)   Height: 170.2 cm (67\")         Assessment/Plan   Diagnoses and all orders for this visit:    1. Dermatitis (Primary)  -     triamcinolone (KENALOG) 0.5 % ointment; Apply  topically to the appropriate area as directed 2 (Two) Times a Day.  Dispense: 45 g; Refill: 2  -     Ambulatory Referral to Dermatology    2. Nevus of left lower leg  -     Ambulatory Referral to Dermatology      Ointment as directed  Referred to derm for skin ck  Return in about 4 weeks (around 7/16/2021) for Annual.           "

## 2021-06-18 NOTE — THERAPY PROGRESS REPORT/RE-CERT
Outpatient Physical Therapy Ortho Progress Note   Claudia     Patient Name: Marisol Cuellar  : 1985  MRN: [id number]  Today's Date: 2021      Visit Date: 2021    Visit Dx:    ICD-10-CM ICD-9-CM   1. Chronic pain of both shoulders  M25.511 719.41    G89.29 338.29    M25.512        Patient Active Problem List   Diagnosis   • Hyperglycemia   • Nausea and vomiting due to hyperglycemia   • Generalized pain   • Chest pain   • Hyponatremia   • Type 1 diabetes mellitus without complication (CMS/HCC)   • hx of Graves disease, now hypothyroid   • IV drug abuse (CMS/HCC)   • Multiple allergies   • Postablative hypothyroidism   • Microalbuminuria due to type 1 diabetes mellitus (CMS/HCC)   • Elevated transaminase level   • Anxiety and depression   • Diabetic frozen shoulder associated with type 1 diabetes mellitus (CMS/HCC)   • Adhesive capsulitis of left shoulder   • Primary localized osteoarthrosis of right shoulder region   • Bursitis of left shoulder   • Impingement syndrome of left shoulder   • Adhesive capsulitis of right shoulder        Past Medical History:   Diagnosis Date   • Anxiety    • Chronic low back pain    • Depression    • Diabetes mellitus (CMS/HCC)     TYPE 1   • Graves disease    • Hep C     was treated    • Herniated lumbar intervertebral disc    • History of Papanicolaou smear of cervix ?    old PCP. Dr. Lee   • IV drug abuse (CMS/HCC)         Past Surgical History:   Procedure Laterality Date   •  SECTION       and    • ENDOMETRIAL ABLATION  2006   • FOOT SURGERY Right 2014    METAL DINA PLACED IN FOOT   • LAMINECTOMY  2020    lower lubmar laminectomy   • SINUS SURGERY      ,    • TONSILLECTOMY  2006       PT Ortho     Row Name 21 1200       Subjective Pain    Able to rate subjective pain?  yes  -MM (r) WL (t) MM (c)    Pre-Treatment Pain Level  5 5/10 left shoulder; 3/10 right shoulder  -MM (r) WL (t) MM (c)       Posture/Observations     Posture/Observations Comments  Palpation: no specific tenderness  -MM (r) WL (t) MM (c)       Right Upper Ext    Rt Shoulder Abduction AROM  94  -MM (r) WL (t) MM (c)    Rt Shoulder Extension AROM  31  -MM (r) WL (t) MM (c)    Rt Shoulder Flexion AROM  120  -MM (r) WL (t) MM (c)    Rt Shoulder External Rotation AROM  40  -MM (r) WL (t) MM (c)    Rt Shoulder Internal Rotation AROM  L3  -MM (r) WL (t) MM (c)       Left Upper Ext    Lt Shoulder Abduction AROM  90  -MM (r) WL (t) MM (c)    Lt Shoulder Extension AROM  24  -MM (r) WL (t) MM (c)    Lt Shoulder Flexion AROM  101  -MM (r) WL (t) MM (c)    Lt Shoulder External Rotation AROM  25  -MM (r) WL (t) MM (c)    Lt Shoulder Internal Rotation AROM  S1  -MM (r) WL (t) MM (c)        Strength Right    Right  Test 1  35  -MM (r) WL (t) MM (c)     Strength Average Right  35  -MM (r) WL (t)        Strength Left    Left  Test 1  38  -MM (r) WL (t) MM (c)     Strength Average Left  38  -MM (r) WL (t)       Hand  Strength     Strength Affected Side  Bilateral  -MM (r) WL (t) MM (c)      User Key  (r) = Recorded By, (t) = Taken By, (c) = Cosigned By    Initials Name Provider Type    MM Jhonatan Diallo, PT Physical Therapist    WL Mina Hernandez, PT Student PT Student          PT Assessment/Plan     Row Name 06/18/21 1200          PT Assessment    Functional Limitations  Limitation in home management;Performance in leisure activities;Performance in self-care ADL  -MM     Impairments  Muscle strength;Pain;Range of motion  -MM     Assessment Comments  Client tolerated activities well, despite report of increased soreness. Client demonstrates significant deficits in shoulder ROM and strength bilaterally. Client is limited by pain. Client is progressing toward her goals at this time. Client would continue to benefit from skilled physical therapy services to address these benefits and improve independence.  -MM (r) WL (t) MM (c)     Please refer  to paper survey for additional self-reported information  Yes  -MM     Rehab Potential  Good  -MM     Patient/caregiver participated in establishment of treatment plan and goals  Yes  -MM     Patient would benefit from skilled therapy intervention  Yes  -MM        PT Plan    PT Frequency  2x/week  -MM     Predicted Duration of Therapy Intervention (PT)  8 visits  -MM     Planned CPT's?  PT THER PROC EA 15 MIN: 88272;PT THER ACT EA 15 MIN: 53834;PT MANUAL THERAPY EA 15 MIN: 08067  -MM     PT Plan Comments  Continue per POC and progressing pt as necessary  -MM (r) WL (t) MM (c)       User Key  (r) = Recorded By, (t) = Taken By, (c) = Cosigned By    Initials Name Provider Type    Jhonatan Chong, PT Physical Therapist    Mina Javier, PT Student PT Student            OP Exercises     Row Name 06/18/21 1200             Subjective Comments    Subjective Comments  No new complaints. She continues with some frequent shoulder pain. She is waiting to hear back regarding MRI.  -MM (r) WL (t) MM (c)         Subjective Pain    Able to rate subjective pain?  yes  -MM (r) WL (t) MM (c)      Pre-Treatment Pain Level  5 5/10 left shoulder; 3/10 right shoulder  -MM (r) WL (t) MM (c)      Post-Treatment Pain Level  4  -MM (r) WL (t) MM (c)         Total Minutes    83595 - PT Therapeutic Exercise Minutes  30  -MM (r) WL (t) MM (c)         Exercise 4    Exercise Name 4  AAROM w cane bilateral shoulder sidelying: ER, abd, flexion/ext  -MM (r) WL (t) MM (c)      Reps 4  15 each  -MM (r) WL (t) MM (c)         Exercise 6    Exercise Name 6  no $  -MM (r) WL (t) MM (c)      Sets 6  2  -MM (r) WL (t) MM (c)      Reps 6  10  -MM (r) WL (t) MM (c)        User Key  (r) = Recorded By, (t) = Taken By, (c) = Cosigned By    Initials Name Provider Type    Jhonatan Chong, PT Physical Therapist    Mina Javier, PT Student PT Student          PT OP Goals     Row Name 06/18/21 1200          PT Short Term Goals    STG  Date to Achieve  06/10/21  -MM (r) WL (t) MM (c)     STG 1  Client will report 50% improvement in shoulder pain with daily activity.  -MM (r) WL (t) MM (c)     STG 1 Progress  Progressing  -MM (r) WL (t) MM (c)     STG 2  Client independent with home program to maximize mobility.  -MM (r) WL (t) MM (c)     STG 2 Progress  Progressing  -MM (r) WL (t) MM (c)     STG 3  Left hand numbness will resolve.  -MM (r) WL (t) MM (c)     STG 3 Progress  Progressing  -MM (r) WL (t) MM (c)     STG 4  Bilateral shoulder flexion will improve to 130 degrees active range of motion.  -MM (r) WL (t) MM (c)     STG 4 Progress  Progressing  -MM (r) WL (t) MM (c)        Long Term Goals    LTG Date to Achieve  07/01/21  -MM (r) WL (t) MM (c)     LTG 1  Client will report 90% improvement shoulder pain with daily activity.  -MM (r) WL (t) MM (c)     LTG 1 Progress  Progressing  -MM (r) WL (t) MM (c)     LTG 2  QuickDASH score will improve to 30% or better.  -MM (r) WL (t) MM (c)     LTG 2 Progress  Progressing  -MM (r) WL (t) MM (c)     LTG 3  Bilateral shoulder active range of motion flexion will improve to 145 degrees or better.  -MM (r) WL (t) MM (c)     LTG 3 Progress  Progressing  -MM (r) WL (t) MM (c)     LTG 4  Client will tolerate lifting 2 pounds overhead with out difficulty.  -MM (r) WL (t) MM (c)     LTG 4 Progress  Progressing  -MM (r) WL (t) MM (c)        Time Calculation    PT Goal Re-Cert Due Date  08/18/21  -MM       User Key  (r) = Recorded By, (t) = Taken By, (c) = Cosigned By    Initials Name Provider Type    Jhonatan Chong, PT Physical Therapist    WL Mina Hernandez, PT Student PT Student               Outcome Measure Options: Quick DASH  Quick DASH  Open a tight or new jar.: Unable  Do heavy household chores (e.g., wash walls, wash floors): Severe Difficulty  Carry a shopping bag or briefcase: Moderate Difficulty  Wash your back: Unable  Use a knife to cut food: Mild Difficulty  Recreational activities in  which you take some force or impact through your arm, should or hand (e.g. golf, hammering, tennis, etc.): Severe Difficulty  During the past week, to what extent has your arm, shoulder, or hand problem interfered with your normal social activites with family, friends, neighbors or groups?: Moderately  During the past week, were you limited in your work or other regular daily activities as a result of your arm, shoulder or hand problem?: Moderately Limited  Arm, Shoulder, or hand pain: Moderate  Tingling (pins and needles) in your arm, shoulder, or hand: Moderate  During the past week, how much difficulty have you had sleeping because of the pain in your arm, shoulder or hand?: Moderate Difficiculty  Number of Questions Answered: 11  Quick DASH Score: 61.36         Time Calculation:   Start Time: 1200  Timed Charges  68340 - PT Therapeutic Exercise Minutes: 30  Total Minutes  Timed Charges Total Minutes: 30   Total Minutes: 30  Therapy Charges for Today     Code Description Service Date Service Provider Modifiers Qty    59234705970 HC PT THER PROC EA 15 MIN 6/18/2021 Jhonatan Diallo, PT GP 2          PT G-Codes  Outcome Measure Options: Quick DASH  Quick DASH Score: 61.36         Jhonatan Diallo, PT  6/18/2021

## 2021-06-25 ENCOUNTER — HOSPITAL ENCOUNTER (OUTPATIENT)
Dept: PHYSICAL THERAPY | Facility: HOSPITAL | Age: 36
Setting detail: THERAPIES SERIES
Discharge: HOME | End: 2021-06-25

## 2021-06-25 DIAGNOSIS — M25.511 CHRONIC PAIN OF BOTH SHOULDERS: Primary | ICD-10-CM

## 2021-06-25 DIAGNOSIS — G89.29 CHRONIC PAIN OF BOTH SHOULDERS: Primary | ICD-10-CM

## 2021-06-25 DIAGNOSIS — M25.512 CHRONIC PAIN OF BOTH SHOULDERS: Primary | ICD-10-CM

## 2021-06-25 PROCEDURE — 97110 THERAPEUTIC EXERCISES: CPT

## 2021-06-25 NOTE — THERAPY TREATMENT NOTE
Outpatient Physical Therapy Ortho Treatment Note   Claudia     Patient Name: Marisol Cuellar  : 1985  MRN: [id number]  Today's Date: 2021      Visit Date: 2021    Visit Dx:    ICD-10-CM ICD-9-CM   1. Chronic pain of both shoulders  M25.511 719.41    G89.29 338.29    M25.512        Patient Active Problem List   Diagnosis   • Hyperglycemia   • Nausea and vomiting due to hyperglycemia   • Generalized pain   • Chest pain   • Hyponatremia   • Type 1 diabetes mellitus without complication (CMS/HCC)   • hx of Graves disease, now hypothyroid   • IV drug abuse (CMS/HCC)   • Multiple allergies   • Postablative hypothyroidism   • Microalbuminuria due to type 1 diabetes mellitus (CMS/HCC)   • Elevated transaminase level   • Anxiety and depression   • Diabetic frozen shoulder associated with type 1 diabetes mellitus (CMS/HCC)   • Adhesive capsulitis of left shoulder   • Primary localized osteoarthrosis of right shoulder region   • Bursitis of left shoulder   • Impingement syndrome of left shoulder   • Adhesive capsulitis of right shoulder        Past Medical History:   Diagnosis Date   • Anxiety    • Chronic low back pain    • Depression    • Diabetes mellitus (CMS/HCC)     TYPE 1   • Graves disease    • Hep C     was treated    • Herniated lumbar intervertebral disc    • History of Papanicolaou smear of cervix ?    old PCP. Dr. Lee   • IV drug abuse (CMS/HCC)         Past Surgical History:   Procedure Laterality Date   •  SECTION       and    • ENDOMETRIAL ABLATION     • FOOT SURGERY Right 2014    METAL DINA PLACED IN FOOT   • LAMINECTOMY  2020    lower lubmar laminectomy   • SINUS SURGERY      ,    • TONSILLECTOMY  2006                       PT Assessment/Plan     Row Name 21 1020          PT Assessment    Assessment Comments  Good tolerance to exercise today. She does have pain at end ranges and some capsular tightness.   -MM        PT Plan    PT Plan  Comments  Continue with AAROM/AROM exercises to improve mobility. She may work on strength in lower ranges, but focus will be primarily for mobility.   -MM       User Key  (r) = Recorded By, (t) = Taken By, (c) = Cosigned By    Initials Name Provider Type    Jhonatan Chong, PT Physical Therapist            OP Exercises     Row Name 06/25/21 1020             Subjective Comments    Subjective Comments  Client reports mild pain today at 4/10.   -MM         Subjective Pain    Able to rate subjective pain?  yes  -MM      Pre-Treatment Pain Level  4  -MM      Post-Treatment Pain Level  4  -MM         Total Minutes    95453 - PT Therapeutic Exercise Minutes  30  -MM         Exercise 1    Exercise Name 1  UBE  -MM      Time 1  4 min  -MM         Exercise 2    Exercise Name 2  CC shoulder extension 3 pl/ IR 2 pl  -MM      Reps 2  15/15  -MM      Additional Comments  3 pl  -MM         Exercise 3    Exercise Name 3  CC: rows 3 pl/ chest press 2 pl  -MM      Reps 3  12/15  -MM         Exercise 4    Exercise Name 4  wall slides bilateral: flexion, scaption, circles  -MM      Reps 4  10/10/10  -MM         Exercise 5    Exercise Name 5  ER at 90 w ball  -MM      Sets 5  --  -MM      Reps 5  12  -MM         Exercise 6    Exercise Name 6  standing bar flexion/ supine bar flexion  -MM      Sets 6  2  -MM      Reps 6  10  -MM         Exercise 7    Exercise Name 7  sidelying abduction/ sidelying ER  -MM      Reps 7  10/10  -MM         Exercise 8    Exercise Name 8  pendulums  -MM      Reps 8  15  -MM        User Key  (r) = Recorded By, (t) = Taken By, (c) = Cosigned By    Initials Name Provider Type    Jhonatan Chong, PT Physical Therapist                                          Time Calculation:   Start Time: 1020  Timed Charges  90684 - PT Therapeutic Exercise Minutes: 30  Total Minutes  Timed Charges Total Minutes: 30   Total Minutes: 30  Therapy Charges for Today     Code Description Service Date Service Provider  Modifiers Qty    [id number]  PT THER PROC EA 15 MIN 6/25/2021 Jhonatan Diallo, PT GP 2                    Jhonatan Diallo, PT  6/25/2021

## 2021-07-25 ENCOUNTER — DOCUMENTATION (OUTPATIENT)
Dept: PHYSICAL THERAPY | Facility: HOSPITAL | Age: 36
End: 2021-07-25

## 2021-07-25 DIAGNOSIS — M25.511 CHRONIC PAIN OF BOTH SHOULDERS: Primary | ICD-10-CM

## 2021-07-25 DIAGNOSIS — M25.512 CHRONIC PAIN OF BOTH SHOULDERS: Primary | ICD-10-CM

## 2021-07-25 DIAGNOSIS — G89.29 CHRONIC PAIN OF BOTH SHOULDERS: Primary | ICD-10-CM

## 2021-09-20 DIAGNOSIS — E89.0 POSTABLATIVE HYPOTHYROIDISM: ICD-10-CM

## 2021-09-20 RX ORDER — TRAZODONE HYDROCHLORIDE 300 MG/1
TABLET ORAL
OUTPATIENT
Start: 2021-09-20

## 2021-09-20 RX ORDER — LEVOTHYROXINE SODIUM 300 UG/1
300 TABLET ORAL DAILY
Qty: 30 TABLET | Refills: 6 | OUTPATIENT
Start: 2021-09-20

## 2021-09-20 NOTE — TELEPHONE ENCOUNTER
Caller: Marisol Cuellar    Relationship: Self    Best call back number:   Medication needed:   Requested Prescriptions     Pending Prescriptions Disp Refills   • levothyroxine (SYNTHROID, LEVOTHROID) 300 MCG tablet 30 tablet 6     Sig: Take 1 tablet by mouth Daily.   • traZODone (DESYREL) 300 MG tablet         When do you need the refill by: ASAP    What additional details did the patient provide when requesting the medication: TOTALLY OT OF MEDICATION    Does the patient have less than a 3 day supply:  [x] Yes  [] No    What is the patient's preferred pharmacy:      [facility]

## 2021-09-20 NOTE — TELEPHONE ENCOUNTER
LV: related to thyroid 04/10/2018  NV: not scheduled  Labs: TSH 05/20/2019  Routine labs 01/10/2021     Plan: Patient declines Efudex Detail Level: Detailed

## 2022-01-10 ENCOUNTER — LAB (OUTPATIENT)
Dept: LAB | Facility: HOSPITAL | Age: 37
End: 2022-01-10

## 2022-01-10 ENCOUNTER — TELEMEDICINE (OUTPATIENT)
Dept: INTERNAL MEDICINE | Facility: CLINIC | Age: 37
End: 2022-01-10

## 2022-01-10 DIAGNOSIS — J06.9 UPPER RESPIRATORY TRACT INFECTION, UNSPECIFIED TYPE: ICD-10-CM

## 2022-01-10 DIAGNOSIS — R51.9 NONINTRACTABLE HEADACHE, UNSPECIFIED CHRONICITY PATTERN, UNSPECIFIED HEADACHE TYPE: ICD-10-CM

## 2022-01-10 DIAGNOSIS — J02.9 SORE THROAT: Primary | ICD-10-CM

## 2022-01-10 DIAGNOSIS — R53.83 OTHER FATIGUE: ICD-10-CM

## 2022-01-10 DIAGNOSIS — R52 BODY ACHES: ICD-10-CM

## 2022-01-10 DIAGNOSIS — J02.9 SORE THROAT: ICD-10-CM

## 2022-01-10 LAB
EXPIRATION DATE: NORMAL
EXPIRATION DATE: NORMAL
FLUAV AG NPH QL: NEGATIVE
FLUBV AG NPH QL: NEGATIVE
INTERNAL CONTROL: NORMAL
INTERNAL CONTROL: NORMAL
Lab: NORMAL
Lab: NORMAL
S PYO AG THROAT QL: NEGATIVE

## 2022-01-10 PROCEDURE — U0004 COV-19 TEST NON-CDC HGH THRU: HCPCS | Performed by: NURSE PRACTITIONER

## 2022-01-10 PROCEDURE — 87081 CULTURE SCREEN ONLY: CPT | Performed by: NURSE PRACTITIONER

## 2022-01-10 PROCEDURE — 87880 STREP A ASSAY W/OPTIC: CPT | Performed by: NURSE PRACTITIONER

## 2022-01-10 PROCEDURE — 99214 OFFICE O/P EST MOD 30 MIN: CPT | Performed by: NURSE PRACTITIONER

## 2022-01-10 PROCEDURE — 87804 INFLUENZA ASSAY W/OPTIC: CPT | Performed by: NURSE PRACTITIONER

## 2022-01-10 RX ORDER — AZITHROMYCIN 250 MG/1
TABLET, FILM COATED ORAL
Qty: 6 TABLET | Refills: 0 | Status: SHIPPED | OUTPATIENT
Start: 2022-01-10 | End: 2022-02-24 | Stop reason: SDUPTHER

## 2022-01-10 NOTE — PROGRESS NOTES
Subjective   Marisol Cuellar is a 36 y.o. female    Chief Complaint   Patient presents with   • Sore Throat     swollen glands   • Generalized Body Aches   • Fatigue   • Headache     History of Present Illness     This was an audio and video enabled telemedicine encounter.    You have chosen to receive care through a telehealth visit.  Do you consent to use a video/audio connection for your medical care today? Yes    Pt states that for one week she has been experiencing sore throat with swollen lymph nodes on the right, along with HA and fatigue.  Now also has bodyaches, slight cough at times and PND  Has taken tylenol w/o relief of sx's.  No fever or chills.      She did have bronchitis a few weeks ago and was treated with antibiotics.  COVID was negative at that time.      She has had 2 COVID vaccines, but not booster.  Last vaccine was 4/2021.      She has not had the flu vaccine this year.      The following portions of the patient's history were reviewed and updated as appropriate: allergies, current medications, past family history, past medical history, past social history, past surgical history and problem list.    Current Outpatient Medications:   •  azithromycin (Zithromax Z-Jesus) 250 MG tablet, Take 2 tablets the first day, then 1 tablet daily for 4 days., Disp: 6 tablet, Rfl: 0  •  B-D ULTRAFINE III SHORT PEN 31G X 8 MM misc, USE UTD WITH TRESIBA, Disp: , Rfl: 3  •  busPIRone (BUSPAR) 15 MG tablet, Take 15 mg by mouth 2 (two) times a day., Disp: , Rfl:   •  Continuous Blood Gluc  (Dexcom G6 ) device, , Disp: , Rfl:   •  Continuous Blood Gluc Sensor (Dexcom G6 Sensor), , Disp: , Rfl:   •  Continuous Blood Gluc Transmit (Dexcom G6 Transmitter) misc, , Disp: , Rfl:   •  escitalopram (LEXAPRO) 20 MG tablet, Take 20 mg by mouth Daily., Disp: , Rfl:   •  glucose blood (Kavitha Contour Test) test strip, Check your blood sugars 4 (four) times daily., Disp: 200 each, Rfl: 11  •  hydrOXYzine pamoate  (VISTARIL) 50 MG capsule, , Disp: , Rfl:   •  Insulin Degludec (TRESIBA SC), Inject 25 Units under the skin into the appropriate area as directed., Disp: , Rfl:   •  Insulin Lispro (ADMELOG SOLOSTAR) 100 UNIT/ML injection pen, , Disp: , Rfl:   •  insulin lispro (Admelog) 100 UNIT/ML injection, Inject  under the skin into the appropriate area as directed 3 (Three) Times a Day Before Meals. Sliding scale, Disp: , Rfl:   •  Lantus SoloStar 100 UNIT/ML injection pen, , Disp: , Rfl:   •  levothyroxine (SYNTHROID, LEVOTHROID) 300 MCG tablet, Take 1 tablet by mouth Daily., Disp: 30 tablet, Rfl: 6  •  traZODone (DESYREL) 300 MG tablet, , Disp: , Rfl:   •  triamcinolone (KENALOG) 0.5 % ointment, Apply  topically to the appropriate area as directed 2 (Two) Times a Day., Disp: 45 g, Rfl: 2     Review of Systems   Constitutional: Positive for fatigue. Negative for chills and fever.   HENT: Positive for congestion, postnasal drip and sore throat. Negative for sinus pressure.    Respiratory: Positive for cough. Negative for chest tightness and shortness of breath.    Cardiovascular: Negative for chest pain.   Gastrointestinal: Negative for abdominal pain, diarrhea, nausea and vomiting.   Endocrine: Negative for cold intolerance and heat intolerance.   Musculoskeletal: Positive for myalgias. Negative for arthralgias.   Neurological: Negative for dizziness.   Hematological: Positive for adenopathy.       Objective   Physical Exam  Constitutional:       Appearance: Normal appearance.   HENT:      Head: Normocephalic.      Nose: Congestion present.      Mouth/Throat:      Pharynx: Posterior oropharyngeal erythema present.   Eyes:      Pupils: Pupils are equal, round, and reactive to light.   Pulmonary:      Effort: Pulmonary effort is normal.   Musculoskeletal:         General: Normal range of motion.      Cervical back: Normal range of motion.   Neurological:      Mental Status: She is alert and oriented to person, place, and time.    Psychiatric:         Behavior: Behavior normal.       There were no vitals filed for this visit.      Assessment/Plan   Diagnoses and all orders for this visit:    1. Sore throat (Primary)  -     COVID-19 PCR, [facility], NP SWAB IN [facility]AL TRANSPORT MEDIA/ORAL SWISH 24-30 HR TAT - Swab, Nasopharynx; Future  -     Throat / Upper Respiratory Culture - Swab, Throat; Future  -     POC Rapid Strep A  -     POC Influenza A / B    2. Nonintractable headache, unspecified chronicity pattern, unspecified headache type  -     COVID-19 PCR, [facility], NP SWAB IN [facility]AL TRANSPORT MEDIA/ORAL SWISH 24-30 HR TAT - Swab, Nasopharynx; Future  -     Throat / Upper Respiratory Culture - Swab, Throat; Future  -     POC Rapid Strep A  -     POC Influenza A / B    3. Body aches  -     COVID-19 PCR, [facility], NP SWAB IN [facility] VIRAL TRANSPORT MEDIA/ORAL SWISH 24-30 HR TAT - Swab, Nasopharynx; Future  -     Throat / Upper Respiratory Culture - Swab, Throat; Future  -     POC Rapid Strep A  -     POC Influenza A / B    4. Other fatigue  -     COVID-19 PCR, [facility], NP SWAB IN [facility]AL TRANSPORT MEDIA/ORAL SWISH 24-30 HR TAT - Swab, Nasopharynx; Future  -     Throat / Upper Respiratory Culture - Swab, Throat; Future  -     POC Rapid Strep A  -     POC Influenza A / B    5. Upper respiratory tract infection, unspecified type  -     COVID-19 PCR, [facility], NP SWAB IN [facility] V[facility]AL TRANSPORT MEDIA/ORAL SWISH 24-30 HR TAT - Swab, Nasopharynx; Future  -     Throat / Upper Respiratory Culture - Swab, Throat; Future  -     POC Rapid Strep A  -     POC Influenza A / B  -     azithromycin (Zithromax Z-Jesus) 250 MG tablet; Take 2 tablets the first day, then 1 tablet daily for 4 days.  Dispense: 6 tablet; Refill: 0      Pt is in parking lot, we will swab for strep, flu and COVID  Rapid strep and flu neg, will send throat culture  Covered with zpack  Increase fluids  Quarantine until COVID results are available  RTC if sx's worsen or  do not improve

## 2022-01-11 LAB — SARS-COV-2 RNA NOSE QL NAA+PROBE: NOT DETECTED

## 2022-01-12 ENCOUNTER — TELEPHONE (OUTPATIENT)
Dept: INTERNAL MEDICINE | Facility: CLINIC | Age: 37
End: 2022-01-12

## 2022-01-12 LAB — BACTERIA SPEC AEROBE CULT: NORMAL

## 2022-01-13 ENCOUNTER — TELEPHONE (OUTPATIENT)
Dept: INTERNAL MEDICINE | Facility: CLINIC | Age: 37
End: 2022-01-13

## 2022-01-13 NOTE — TELEPHONE ENCOUNTER
----- Message from JOE Roach sent at 1/13/2022  8:48 AM EST -----  Throat culture is negative  covid negative

## 2022-02-24 ENCOUNTER — TELEMEDICINE (OUTPATIENT)
Dept: INTERNAL MEDICINE | Facility: CLINIC | Age: 37
End: 2022-02-24

## 2022-02-24 DIAGNOSIS — H10.33 ACUTE BACTERIAL CONJUNCTIVITIS OF BOTH EYES: ICD-10-CM

## 2022-02-24 DIAGNOSIS — J06.9 UPPER RESPIRATORY TRACT INFECTION, UNSPECIFIED TYPE: Primary | ICD-10-CM

## 2022-02-24 PROCEDURE — 99213 OFFICE O/P EST LOW 20 MIN: CPT | Performed by: PHYSICIAN ASSISTANT

## 2022-02-24 RX ORDER — BROMPHENIRAMINE MALEATE, PSEUDOEPHEDRINE HYDROCHLORIDE, AND DEXTROMETHORPHAN HYDROBROMIDE 2; 30; 10 MG/5ML; MG/5ML; MG/5ML
5-10 SYRUP ORAL EVERY 6 HOURS PRN
Qty: 120 ML | Refills: 0 | Status: SHIPPED | OUTPATIENT
Start: 2022-02-24 | End: 2022-03-03

## 2022-02-24 RX ORDER — AZITHROMYCIN 250 MG/1
TABLET, FILM COATED ORAL
Qty: 6 TABLET | Refills: 0 | OUTPATIENT
Start: 2022-02-24 | End: 2022-12-13

## 2022-02-24 RX ORDER — OFLOXACIN 3 MG/ML
1 SOLUTION/ DROPS OPHTHALMIC 4 TIMES DAILY
Qty: 5 ML | Refills: 0 | Status: SHIPPED | OUTPATIENT
Start: 2022-02-24 | End: 2023-01-29

## 2022-02-24 NOTE — PROGRESS NOTES
Mode of Visit: Video  Location of patient: Home   You have chosen to receive care through a telehealth visit.  Do you consent to use a audio/video connection for your medical care today? Yes  This was an audio and video enabled telemedicine encounter.  No technical issues occurred during this visit.    Chief Complaint  Nasal Congestion and Cough    Subjective          History of Present Illness  Marisol Cuellar presents to [facility] for   Cough:  Has had cough and congestion for the last month that eased up but now has gotten worse over the last 4-5 days ago. Has had cough to the point of gagging. Cough sounds croupy. Has not had any wheeze or trouble breathing. Has not had ear pain but has had ear pressure. Has not had a fever. Has a mild sore throat. Has had some RSV and covid going around the  along with pink eye.  She woke up this morning and both eyes were swollen and goupy, she could not open them.   Started Keflex a week ago for a UTI but it has not helped her cough. Urinary infection has resolved.       Review of Systems   Constitutional: Negative for fever and unexpected weight loss.   HENT: Positive for congestion, rhinorrhea and sore throat. Negative for sinus pressure.    Eyes: Positive for discharge and redness. Negative for pain.   Respiratory: Positive for cough. Negative for shortness of breath and wheezing.    Cardiovascular: Negative for chest pain and palpitations.   Gastrointestinal: Negative for abdominal pain, diarrhea, nausea and vomiting.   Musculoskeletal: Negative for myalgias.   Skin: Negative for rash.   Neurological: Negative for headache.       The following portions of the patient's history were reviewed and updated as appropriate: allergies, current medications, past family history, past medical history, past social history, past surgical history and problem list.    Allergies   Allergen Reactions   • Peanut-Containing Drug Products Other (See  Comments)     Coughing and tightness in chest   • Shellfish-Derived Products Other (See Comments)     Itchy throat and tightness   • Sulfa Antibiotics Anaphylaxis   • Dairy Aid [Lactase] Rash     migraines   • Eggs Or Egg-Derived Products Rash     migraines   • Soybean-Containing Drug Products Rash     migraines   • Wheat Extract Rash     migraines     Current Outpatient Medications on File Prior to Visit   Medication Sig Dispense Refill   • B-D ULTRAFINE III SHORT PEN 31G X 8 MM misc USE UTD WITH TRESIBA  3   • busPIRone (BUSPAR) 15 MG tablet Take 15 mg by mouth 2 (two) times a day.     • Continuous Blood Gluc  (Dexcom G6 ) device      • Continuous Blood Gluc Sensor (Dexcom G6 Sensor)      • Continuous Blood Gluc Transmit (Dexcom G6 Transmitter) misc      • escitalopram (LEXAPRO) 20 MG tablet Take 20 mg by mouth Daily.     • glucose blood (Kavitha Contour Test) test strip Check your blood sugars 4 (four) times daily. 200 each 11   • hydrOXYzine pamoate (VISTARIL) 50 MG capsule      • Insulin Degludec (TRESIBA SC) Inject 25 Units under the skin into the appropriate area as directed.     • Insulin Lispro (ADMELOG SOLOSTAR) 100 UNIT/ML injection pen      • insulin lispro (Admelog) 100 UNIT/ML injection Inject  under the skin into the appropriate area as directed 3 (Three) Times a Day Before Meals. Sliding scale     • Lantus SoloStar 100 UNIT/ML injection pen      • levothyroxine (SYNTHROID, LEVOTHROID) 300 MCG tablet Take 1 tablet by mouth Daily. 30 tablet 6   • traZODone (DESYREL) 300 MG tablet      • triamcinolone (KENALOG) 0.5 % ointment Apply  topically to the appropriate area as directed 2 (Two) Times a Day. 45 g 2   • [DISCONTINUED] azithromycin (Zithromax Z-Jesus) 250 MG tablet Take 2 tablets the first day, then 1 tablet daily for 4 days. 6 tablet 0     No current facility-administered medications on file prior to visit.     New Medications Ordered This Visit   Medications   •  brompheniramine-pseudoephedrine-DM 30-2-10 MG/5ML syrup     Sig: Take 5-10 mL by mouth Every 6 (Six) Hours As Needed for Congestion or Cough for up to 7 days.     Dispense:  120 mL     Refill:  0   • azithromycin (Zithromax Z-Jesus) 250 MG tablet     Sig: Take 2 tablets the first day, then 1 tablet daily for 4 days.     Dispense:  6 tablet     Refill:  0   • ofloxacin (Ocuflox) 0.3 % ophthalmic solution     Sig: Administer 1 drop to both eyes 4 (Four) Times a Day.     Dispense:  5 mL     Refill:  0     Social History     Tobacco Use   Smoking Status Former Smoker   • Packs/day: 1.00   • Years: 5.00   • Pack years: 5.00   • Types: Cigarettes   • Start date:    • Quit date: 2021   • Years since quittin.8   Smokeless Tobacco Never Used        Objective   There were no vitals filed for this visit.  There is no height or weight on file to calculate BMI.    Physical Exam   Constitutional: She appears well-developed and well-nourished. No distress.   HENT:   Head: Normocephalic and atraumatic.   Eyes: EOM are normal. Right eye exhibits discharge. Left eye exhibits discharge.   Conjunctivitis noted   Neck: Neck normal appearance.  Pulmonary/Chest: Effort normal.  No respiratory distress. She no audible wheeze...  Neurological: She is alert.   Psychiatric: She has a normal mood and affect.   Vitals reviewed.      Result Review :        Assessment and Plan    Diagnoses and all orders for this visit:    1. Upper respiratory tract infection, unspecified type (Primary)  -     brompheniramine-pseudoephedrine-DM 30-2-10 MG/5ML syrup; Take 5-10 mL by mouth Every 6 (Six) Hours As Needed for Congestion or Cough for up to 7 days.  Dispense: 120 mL; Refill: 0  -     azithromycin (Zithromax Z-Jesus) 250 MG tablet; Take 2 tablets the first day, then 1 tablet daily for 4 days.  Dispense: 6 tablet; Refill: 0    2. Acute bacterial conjunctivitis of both eyes  -     ofloxacin (Ocuflox) 0.3 % ophthalmic solution; Administer 1 drop to  both eyes 4 (Four) Times a Day.  Dispense: 5 mL; Refill: 0            Follow Up   Return if symptoms worsen or fail to improve.    Follow up if symptoms worsen or persist or has new or concerning symptoms, go to ER for severe symptoms.   I discussed my findings, recommendations, and plan of care with the patient. Reviewed common medication effects and side effects and to report side effects immediately. Encouraged medication compliance and the importance of keeping scheduled follow up appointments. Pt verbalized understanding and agreement.  If a referral was made please contact our office if you have not heard about an appointment in the next 2 weeks.   If labs or images are ordered we will contact you with the results within the next week.  If you have not heard from us after a week please call our office to inquire about the results.     I have reviewed the limitations of a telehealth visit (such as lack of a physical exam and unable to obtain vital signs) and advised the patient that they may need to follow up for an office visit should their symptoms or concerns persist, worsen, or change.    Nabila Meraz PA-C    * Please note that portions of this note were completed with a voice recognition program.

## 2022-03-03 NOTE — TELEPHONE ENCOUNTER
Please approve or deny.     Lexapro   Last appt: 2/24/2022 for sore throat (Telemed)   Next appt: no future appt scheduled/

## 2022-03-04 RX ORDER — ESCITALOPRAM OXALATE 20 MG/1
20 TABLET ORAL DAILY
Qty: 30 TABLET | Refills: 0 | Status: SHIPPED | OUTPATIENT
Start: 2022-03-04

## 2022-03-15 NOTE — THERAPY DISCHARGE NOTE
Outpatient Physical Therapy Discharge Summary         Patient Name: Marisol Cuellar  : 1985  MRN: [id number]    Today's Date: 3/15/2022    Visit Dx:    ICD-10-CM ICD-9-CM   1. Chronic pain of both shoulders  M25.511 719.41    G89.29 338.29    M25.512      Client attended 6 PT visits from 21 to 21 for bilateral shoulder pain. At last visit she had made some good progress with ROM, but continued with capsular tightness at end ranges. She did not return for further treatment. Prognosis at MT is good.           Jhonatan Diallo, PT  3/15/2022

## 2022-04-26 NOTE — TELEPHONE ENCOUNTER
----- Message from JOE Roach sent at 4/29/2021 11:31 AM EDT -----  Nuswab was + for yeast.  I will send in diflucan to cover.  
PN of results. She stated understanding  
[phone]

## 2022-12-13 PROCEDURE — U0004 COV-19 TEST NON-CDC HGH THRU: HCPCS | Performed by: PHYSICIAN ASSISTANT

## 2023-01-01 NOTE — PROGRESS NOTES
Discharge Planning Assessment  [facility]     Patient Name: Marisol Cuellar  MRN: [id number]  Today's Date: 4/4/2019    Admit Date: 4/3/2019    Discharge Needs Assessment     Row Name 04/04/19 1227       Living Environment    Lives With  other (see comments)    Name(s) of Who Lives With Patient  Everett Peñaloza(roomate)    Current Living Arrangements  home/apartment/condo    Primary Care Provided by  self    Provides Primary Care For  no one    Family Caregiver if Needed  parent(s)    Quality of Family Relationships  unable to assess    Able to Return to Prior Arrangements  yes    Living Arrangement Comments  Spoke with pt in room with permission in regards to discharge planning.  Pt resides in [facility] with roommate, Everett Peñaloza.        Transition Planning    Patient/Family Anticipates Transition to  home    Patient/Family Anticipated Services at Transition      Transportation Anticipated  car, drives self;family or friend will provide       Discharge Needs Assessment    Readmission Within the Last 30 Days  no previous admission in last 30 days    Concerns to be Addressed  discharge planning    Equipment Currently Used at Home  medication pump;glucometer    Equipment Needed After Discharge  glucometer;medication pump    Discharge Coordination/Progress  Pt reports she has Passport insurance and denies recent changes in insurance. Pt has prescription coverage and uses [facility] on [address]. Plan is home with roomate at dischrge. Pt currently denies discharge needs. CM will cont t to follow,        Discharge Plan     Row Name 04/04/19 1236       Plan    Plan  discharge plan    Patient/Family in Agreement with Plan  yes    Plan Comments  Plan is home with roomate at discharge. Pt denies discharge needs. CM will cont to follow,        Destination      No service coordination in this encounter.      Durable Medical Equipment      No service coordination in this encounter.     Patient has right upward gaze, tachycardic, possible seizure-like activity.        Ryan Moreno RN  07/12/23 4042   Dialysis/Infusion      No service coordination in this encounter.      Home Medical Care      No service coordination in this encounter.      Therapy      No service coordination in this encounter.      Community Resources      No service coordination in this encounter.        Expected Discharge Date and Time     Expected Discharge Date Expected Discharge Time    Apr 6, 2019         Demographic Summary     Row Name 04/04/19 1226       General Information    General Information Comments  Pt's PCP is Mindy Andrews       Contact Information    Permission Granted to Share Info With      Contact Information Obtained for      Contact Information Comments  Ayah Stiles(mother): [phone]        Functional Status     Row Name 04/04/19 1227       Functional Status    Functional Status Comments  Pt is independent of ADLs        Psychosocial    No documentation.       Abuse/Neglect    No documentation.       Legal    No documentation.       Substance Abuse    No documentation.       Patient Forms    No documentation.           Conchita Lopez RN

## 2023-01-29 ENCOUNTER — LAB (OUTPATIENT)
Dept: LAB | Facility: HOSPITAL | Age: 38
End: 2023-01-29
Payer: COMMERCIAL

## 2023-01-29 DIAGNOSIS — J02.9 PHARYNGITIS, UNSPECIFIED ETIOLOGY: Primary | ICD-10-CM

## 2023-01-29 PROCEDURE — 87070 CULTURE OTHR SPECIMN AEROBIC: CPT | Performed by: NURSE PRACTITIONER

## 2023-01-29 PROCEDURE — 87205 SMEAR GRAM STAIN: CPT | Performed by: NURSE PRACTITIONER

## 2023-01-29 PROCEDURE — U0004 COV-19 TEST NON-CDC HGH THRU: HCPCS | Performed by: NURSE PRACTITIONER

## 2023-06-04 PROCEDURE — 87070 CULTURE OTHR SPECIMN AEROBIC: CPT | Performed by: NURSE PRACTITIONER

## 2023-06-04 PROCEDURE — 87205 SMEAR GRAM STAIN: CPT | Performed by: NURSE PRACTITIONER

## 2023-10-07 PROCEDURE — 87635 SARS-COV-2 COVID-19 AMP PRB: CPT | Performed by: NURSE PRACTITIONER

## 2023-10-24 ENCOUNTER — OFFICE VISIT (OUTPATIENT)
Dept: INTERNAL MEDICINE | Facility: CLINIC | Age: 38
End: 2023-10-24
Payer: COMMERCIAL

## 2023-10-24 VITALS
TEMPERATURE: 97.3 F | SYSTOLIC BLOOD PRESSURE: 138 MMHG | HEIGHT: 67 IN | DIASTOLIC BLOOD PRESSURE: 80 MMHG | WEIGHT: 189.6 LBS | BODY MASS INDEX: 29.76 KG/M2

## 2023-10-24 DIAGNOSIS — N63.20 MASS OF LEFT BREAST, UNSPECIFIED QUADRANT: Primary | ICD-10-CM

## 2023-10-24 DIAGNOSIS — L30.9 ECZEMA, UNSPECIFIED TYPE: ICD-10-CM

## 2023-10-24 PROCEDURE — 99214 OFFICE O/P EST MOD 30 MIN: CPT | Performed by: NURSE PRACTITIONER

## 2023-10-24 RX ORDER — TRIAMCINOLONE ACETONIDE 1 MG/G
1 OINTMENT TOPICAL 2 TIMES DAILY
Qty: 30 G | Refills: 1 | Status: SHIPPED | OUTPATIENT
Start: 2023-10-24

## 2023-10-24 NOTE — PROGRESS NOTES
Subjective   Marisol Cuellar is a 37 y.o. female    Chief Complaint   Patient presents with    Breast Mass     Patient in clinic with a lump on her left breast that has been there for 1 year and has grown in size, patient states it does hurt      Breast Mass  This is a new problem. The current episode started more than 1 month ago. The problem occurs constantly. The problem has been gradually worsening. Associated symptoms include a rash. Pertinent negatives include no abdominal pain, arthralgias, chest pain, chills, coughing, fatigue, fever, nausea or vomiting. Nothing aggravates the symptoms. She has tried nothing for the symptoms. The treatment provided no relief.      Also c/o dry skin.  Has been using hemp oil soap and coconut oil for lotion.  Area is very itchy.  Itching is worse on the breasts.    The following portions of the patient's history were reviewed and updated as appropriate: allergies, current medications, past family history, past medical history, past social history, past surgical history, and problem list.    Current Outpatient Medications:     albuterol sulfate  (90 Base) MCG/ACT inhaler, , Disp: , Rfl:     atomoxetine (STRATTERA) 80 MG capsule, , Disp: , Rfl:     B-D ULTRAFINE III SHORT PEN 31G X 8 MM misc, USE UTD WITH TRESIBA, Disp: , Rfl: 3    busPIRone (BUSPAR) 15 MG tablet, Take 1 tablet by mouth 2 (two) times a day., Disp: , Rfl:     Continuous Blood Gluc  (Dexcom G6 ) device, , Disp: , Rfl:     Continuous Blood Gluc Sensor (Dexcom G6 Sensor), , Disp: , Rfl:     Continuous Blood Gluc Transmit (Dexcom G6 Transmitter) misc, , Disp: , Rfl:     escitalopram (LEXAPRO) 20 MG tablet, Take 1 tablet by mouth Daily., Disp: 30 tablet, Rfl: 0    fluticasone (FLONASE) 50 MCG/ACT nasal spray, , Disp: , Rfl:     Insulin Disposable Pump (Omnipod 5 G6 Intro, Gen 5,) kit, , Disp: , Rfl:     Insulin Lispro (humaLOG) 100 UNIT/ML injection, , Disp: , Rfl:     Insulin Lispro, 1 Unit  Dial, (HUMALOG) 100 UNIT/ML solution pen-injector, , Disp: , Rfl:     levothyroxine (SYNTHROID, LEVOTHROID) 300 MCG tablet, Take 1 tablet by mouth Daily., Disp: 30 tablet, Rfl: 6    losartan (COZAAR) 100 MG tablet, Take 1 tablet by mouth Daily., Disp: 30 tablet, Rfl: 5    triamcinolone (KENALOG) 0.1 % ointment, Apply 1 application  topically to the appropriate area as directed 2 (Two) Times a Day., Disp: 30 g, Rfl: 1     Review of Systems   Constitutional:  Negative for chills, fatigue and fever.   Respiratory:  Negative for cough, chest tightness and shortness of breath.    Cardiovascular:  Negative for chest pain.   Gastrointestinal:  Negative for abdominal pain, diarrhea, nausea and vomiting.   Endocrine: Negative for cold intolerance and heat intolerance.   Musculoskeletal:  Negative for arthralgias.        Left breast mass   Skin:  Positive for rash.   Neurological:  Negative for dizziness.       Objective   Physical Exam  Constitutional:       Appearance: She is well-developed.   HENT:      Head: Normocephalic and atraumatic.   Eyes:      Conjunctiva/sclera: Conjunctivae normal.      Pupils: Pupils are equal, round, and reactive to light.   Cardiovascular:      Rate and Rhythm: Normal rate and regular rhythm.      Heart sounds: Normal heart sounds.   Pulmonary:      Effort: Pulmonary effort is normal.      Breath sounds: Normal breath sounds.   Chest:   Breasts:     Right: Skin change (dry) present.      Left: Mass (left breast mass, approximately 1 cm, round, firm, 1 cm from the areola at 12 o'clock.) and skin change (dry) present.   Abdominal:      General: Bowel sounds are normal.      Palpations: Abdomen is soft.   Musculoskeletal:         General: Normal range of motion.      Cervical back: Normal range of motion.   Skin:     General: Skin is warm and dry.   Neurological:      Mental Status: She is alert and oriented to person, place, and time.      Deep Tendon Reflexes: Reflexes are normal and symmetric.  "  Psychiatric:         Behavior: Behavior normal.         Thought Content: Thought content normal.         Judgment: Judgment normal.       Vitals:    10/24/23 0848   BP: 138/80   Temp: 97.3 °F (36.3 °C)   Weight: 86 kg (189 lb 9.6 oz)   Height: 170.2 cm (67.01\")         Assessment & Plan   Diagnoses and all orders for this visit:    1. Mass of left breast, unspecified quadrant (Primary)  -     Mammo diagnostic digital tomosynthesis bilateral w CAD; Future    2. Eczema, unspecified type  -     triamcinolone (KENALOG) 0.1 % ointment; Apply 1 application  topically to the appropriate area as directed 2 (Two) Times a Day.  Dispense: 30 g; Refill: 1    Will ck a STAT mamm   Triamcinolone cream as directed  Switch to dove white soap and cerave body cream               "

## 2023-11-02 ENCOUNTER — HOSPITAL ENCOUNTER (OUTPATIENT)
Dept: MAMMOGRAPHY | Facility: HOSPITAL | Age: 38
Discharge: HOME | End: 2023-11-02
Admitting: NURSE PRACTITIONER
Payer: COMMERCIAL

## 2023-11-02 ENCOUNTER — TELEPHONE (OUTPATIENT)
Dept: INTERNAL MEDICINE | Facility: CLINIC | Age: 38
End: 2023-11-02
Payer: COMMERCIAL

## 2023-11-02 ENCOUNTER — HOSPITAL ENCOUNTER (OUTPATIENT)
Dept: ULTRASOUND IMAGING | Facility: HOSPITAL | Age: 38
Discharge: HOME | End: 2023-11-02
Payer: COMMERCIAL

## 2023-11-02 DIAGNOSIS — N63.20 MASS OF LEFT BREAST, UNSPECIFIED QUADRANT: ICD-10-CM

## 2023-11-02 PROCEDURE — 76642 ULTRASOUND BREAST LIMITED: CPT

## 2023-11-02 PROCEDURE — 77066 DX MAMMO INCL CAD BI: CPT

## 2023-11-02 PROCEDURE — G0279 TOMOSYNTHESIS, MAMMO: HCPCS

## 2023-11-02 NOTE — TELEPHONE ENCOUNTER
----- Message from JEO Roach sent at 11/2/2023  4:07 PM EDT -----  Please let patient know that the mammogram is requesting/recommending a biopsy of the left breast.  Just want to make sure that she knows and has a schedule.

## 2023-11-22 ENCOUNTER — HOSPITAL ENCOUNTER (OUTPATIENT)
Dept: ULTRASOUND IMAGING | Facility: HOSPITAL | Age: 38
Discharge: HOME | End: 2023-11-22
Payer: COMMERCIAL

## 2023-11-22 ENCOUNTER — HOSPITAL ENCOUNTER (OUTPATIENT)
Dept: MAMMOGRAPHY | Facility: HOSPITAL | Age: 38
Discharge: HOME | End: 2023-11-22
Payer: COMMERCIAL

## 2023-11-22 DIAGNOSIS — R92.8 ABNORMAL MAMMOGRAM: ICD-10-CM

## 2023-11-22 PROCEDURE — A4648 IMPLANTABLE TISSUE MARKER: HCPCS

## 2023-11-22 PROCEDURE — 25010000002 LIDOCAINE 1 % SOLUTION: Performed by: RADIOLOGY

## 2023-11-22 RX ORDER — LIDOCAINE HYDROCHLORIDE AND EPINEPHRINE 10; 10 MG/ML; UG/ML
5 INJECTION, SOLUTION INFILTRATION; PERINEURAL ONCE
Status: COMPLETED | OUTPATIENT
Start: 2023-11-22 | End: 2023-11-22

## 2023-11-22 RX ORDER — LIDOCAINE HYDROCHLORIDE 10 MG/ML
5 INJECTION, SOLUTION INFILTRATION; PERINEURAL ONCE
Status: COMPLETED | OUTPATIENT
Start: 2023-11-22 | End: 2023-11-22

## 2023-11-22 RX ADMIN — Medication 2 ML: at 14:32

## 2023-11-22 RX ADMIN — LIDOCAINE HYDROCHLORIDE,EPINEPHRINE BITARTRATE 4 ML: 10; .01 INJECTION, SOLUTION INFILTRATION; PERINEURAL at 14:32

## 2023-11-27 LAB
CYTO UR: NORMAL
LAB AP CASE REPORT: NORMAL
LAB AP CLINICAL INFORMATION: NORMAL
LAB AP DIAGNOSIS COMMENT: NORMAL
PATH REPORT.FINAL DX SPEC: NORMAL
PATH REPORT.GROSS SPEC: NORMAL

## 2023-11-28 ENCOUNTER — TELEPHONE (OUTPATIENT)
Dept: MAMMOGRAPHY | Facility: HOSPITAL | Age: 38
End: 2023-11-28
Payer: COMMERCIAL

## 2024-05-20 PROCEDURE — 87798 DETECT AGENT NOS DNA AMP: CPT | Performed by: NURSE PRACTITIONER

## 2024-05-20 PROCEDURE — 87801 DETECT AGNT MULT DNA AMPLI: CPT | Performed by: NURSE PRACTITIONER

## 2024-05-20 PROCEDURE — 87491 CHLMYD TRACH DNA AMP PROBE: CPT | Performed by: NURSE PRACTITIONER

## 2024-05-20 PROCEDURE — 87591 N.GONORRHOEAE DNA AMP PROB: CPT | Performed by: NURSE PRACTITIONER

## 2024-05-20 PROCEDURE — 87661 TRICHOMONAS VAGINALIS AMPLIF: CPT | Performed by: NURSE PRACTITIONER

## 2024-05-23 ENCOUNTER — TELEPHONE (OUTPATIENT)
Dept: URGENT CARE | Facility: CLINIC | Age: 39
End: 2024-05-23
Payer: COMMERCIAL

## 2024-05-23 NOTE — TELEPHONE ENCOUNTER
Pt called to get results of swab, informed pt pf results and medication had been called in. Advized pt not to take abx with trazodone. Pt understood.

## 2024-07-29 NOTE — TELEPHONE ENCOUNTER
Caller: Marisol Cuellar    Relationship: Self    Best call back number: [phone]    Requested Prescriptions:   Requested Prescriptions     Pending Prescriptions Disp Refills   • escitalopram (LEXAPRO) 20 MG tablet       Sig: Take 1 tablet by mouth Daily.        Pharmacy where request should be sent: [facility] [address] [facility] [address] - [phone]  - [phone]     Additional details provided by patient: PATIENT NEEDS REFILL     Does the patient have less than a 3 day supply:  [x] Yes  [] No    Darien Moreland Rep   03/03/22 16:15 EST          yes

## 2024-08-25 PROBLEM — R05.9 COUGH: Status: ACTIVE | Noted: 2024-08-25

## 2024-09-10 PROBLEM — M54.16 LUMBAR RADICULOPATHY: Status: ACTIVE | Noted: 2017-11-01

## 2024-09-10 PROBLEM — B18.2 CHRONIC HEPATITIS C: Status: ACTIVE | Noted: 2020-01-23

## 2024-09-10 PROBLEM — F11.10 HEROIN ABUSE: Status: ACTIVE | Noted: 2020-01-23

## 2024-09-10 PROBLEM — I33.0 ACUTE AND SUBACUTE BACTERIAL ENDOCARDITIS: Status: ACTIVE | Noted: 2020-05-12

## 2024-09-10 PROBLEM — M46.36: Status: ACTIVE | Noted: 2020-05-12

## 2024-09-10 PROBLEM — D72.823 NEUTROPHILIC LEUKEMOID REACTION: Status: ACTIVE | Noted: 2020-01-23

## 2024-09-10 PROBLEM — E10.69 TYPE 1 DIABETES MELLITUS WITH OTHER SPECIFIED COMPLICATION: Status: ACTIVE | Noted: 2021-06-14

## 2024-09-10 PROBLEM — E10.40: Status: ACTIVE | Noted: 2021-06-14

## 2024-09-10 PROBLEM — E06.3 HYPOTHYROIDISM DUE TO HASHIMOTO'S THYROIDITIS: Status: ACTIVE | Noted: 2021-06-14

## 2024-09-10 PROBLEM — D63.8 ANEMIA IN OTHER CHRONIC DISEASES CLASSIFIED ELSEWHERE: Status: ACTIVE | Noted: 2020-05-12

## 2024-09-10 PROBLEM — M46.26 OSTEOMYELITIS OF VERTEBRA, LUMBAR REGION: Status: ACTIVE | Noted: 2020-05-12

## 2024-09-10 PROBLEM — E03.8 HYPOTHYROIDISM DUE TO HASHIMOTO'S THYROIDITIS: Status: ACTIVE | Noted: 2021-06-14

## 2024-09-10 PROBLEM — E13.319 RETINOPATHY DUE TO SECONDARY DIABETES MELLITUS: Status: ACTIVE | Noted: 2021-06-14

## 2024-09-10 PROBLEM — L02.416 CUTANEOUS ABSCESS OF LEFT LOWER LIMB: Status: ACTIVE | Noted: 2020-01-23

## 2024-09-10 PROBLEM — L03.116 CELLULITIS OF LEFT LOWER LIMB: Status: ACTIVE | Noted: 2020-01-23

## 2024-09-10 PROBLEM — A40.8 OTHER STREPTOCOCCAL SEPSIS: Status: ACTIVE | Noted: 2020-05-12

## 2024-09-10 PROBLEM — J02.9 SORE THROAT: Status: ACTIVE | Noted: 2024-09-10

## 2024-09-10 PROCEDURE — 87636 SARSCOV2 & INF A&B AMP PRB: CPT | Performed by: NURSE PRACTITIONER

## 2024-09-11 ENCOUNTER — TELEPHONE (OUTPATIENT)
Dept: URGENT CARE | Facility: CLINIC | Age: 39
End: 2024-09-11
Payer: COMMERCIAL

## 2024-11-01 PROBLEM — R53.83 FATIGUE: Status: ACTIVE | Noted: 2024-11-01

## 2024-11-01 PROCEDURE — 87086 URINE CULTURE/COLONY COUNT: CPT | Performed by: NURSE PRACTITIONER

## 2024-11-03 ENCOUNTER — TELEPHONE (OUTPATIENT)
Dept: URGENT CARE | Facility: CLINIC | Age: 39
End: 2024-11-03
Payer: COMMERCIAL

## 2024-11-07 NOTE — TELEPHONE ENCOUNTER
Giana Henson MA  11/3/2024  1:16 PM EST       Notified Pt urine culture was normal. Pt verbalized understanding